# Patient Record
Sex: MALE | Race: OTHER | Employment: FULL TIME | URBAN - METROPOLITAN AREA
[De-identification: names, ages, dates, MRNs, and addresses within clinical notes are randomized per-mention and may not be internally consistent; named-entity substitution may affect disease eponyms.]

---

## 2017-01-27 DIAGNOSIS — E78.5 DYSLIPIDEMIA: ICD-10-CM

## 2017-01-27 DIAGNOSIS — E03.9 ACQUIRED HYPOTHYROIDISM: ICD-10-CM

## 2017-01-27 DIAGNOSIS — R73.03 PRE-DIABETES: ICD-10-CM

## 2017-01-27 DIAGNOSIS — E55.9 VITAMIN D DEFICIENCY: ICD-10-CM

## 2017-01-27 DIAGNOSIS — N50.82 SCROTAL PAIN: ICD-10-CM

## 2017-01-30 RX ORDER — LEVOTHYROXINE SODIUM 25 UG/1
TABLET ORAL
Qty: 30 TAB | Refills: 0 | Status: SHIPPED | OUTPATIENT
Start: 2017-01-30 | End: 2017-03-06 | Stop reason: SDUPTHER

## 2017-03-06 DIAGNOSIS — N50.82 SCROTAL PAIN: ICD-10-CM

## 2017-03-06 DIAGNOSIS — E78.5 DYSLIPIDEMIA: ICD-10-CM

## 2017-03-06 DIAGNOSIS — E55.9 VITAMIN D DEFICIENCY: ICD-10-CM

## 2017-03-06 DIAGNOSIS — E03.9 ACQUIRED HYPOTHYROIDISM: ICD-10-CM

## 2017-03-06 DIAGNOSIS — R73.03 PRE-DIABETES: ICD-10-CM

## 2017-03-08 RX ORDER — LEVOTHYROXINE SODIUM 25 UG/1
TABLET ORAL
Qty: 30 TAB | Refills: 0 | Status: SHIPPED | OUTPATIENT
Start: 2017-03-08 | End: 2017-04-20 | Stop reason: SDUPTHER

## 2017-04-10 ENCOUNTER — OFFICE VISIT (OUTPATIENT)
Dept: FAMILY MEDICINE CLINIC | Age: 47
End: 2017-04-10

## 2017-04-10 VITALS
SYSTOLIC BLOOD PRESSURE: 104 MMHG | DIASTOLIC BLOOD PRESSURE: 60 MMHG | OXYGEN SATURATION: 98 % | RESPIRATION RATE: 18 BRPM | TEMPERATURE: 98.2 F | HEART RATE: 88 BPM | BODY MASS INDEX: 19.31 KG/M2 | HEIGHT: 69 IN | WEIGHT: 130.4 LBS

## 2017-04-10 DIAGNOSIS — R79.89 LOW VITAMIN D LEVEL: ICD-10-CM

## 2017-04-10 DIAGNOSIS — E03.9 ACQUIRED HYPOTHYROIDISM: Primary | ICD-10-CM

## 2017-04-10 NOTE — PROGRESS NOTES
HISTORY OF PRESENT ILLNESS  Henri Fermin is a 48 y.o. male. HPI: Pt is following up to check his TSH, currently taking 25 mcg daily. Also requesting to his vitamin D level. Past Medical History:   Diagnosis Date    Cold hands and feet     Dizziness     GERD (gastroesophageal reflux disease)     Thyroid disease      Past Surgical History:   Procedure Laterality Date    HX ENDOSCOPY     No Known Allergies    Current Outpatient Prescriptions:     levothyroxine (SYNTHROID) 25 mcg tablet, TAKE 1 TABLET BY MOUTH EVERY DAY BEFORE BREAKFAST, Disp: 30 Tab, Rfl: 0    cholecalciferol (VITAMIN D3) 1,000 unit tablet, Take 1 Tab by mouth two (2) times a day., Disp: 60 Tab, Rfl: 3    calcium carbonate (TUMS) 200 mg calcium (500 mg) chew, Take 1 Tab by mouth daily. , Disp: , Rfl:     multivitamin, tx-iron-ca-min (THERA-M W/ IRON) 9 mg iron-400 mcg tab tablet, Take 1 Tab by mouth daily. , Disp: , Rfl:   Review of Systems   Constitutional: Negative. Respiratory: Negative. Cardiovascular: Negative. Gastrointestinal: Negative. Blood pressure 104/60, pulse 88, temperature 98.2 °F (36.8 °C), temperature source Oral, resp. rate 18, height 5' 9\" (1.753 m), weight 130 lb 6.4 oz (59.1 kg), SpO2 98 %. Physical Exam   Constitutional: No distress. HENT:   Mouth/Throat: Oropharynx is clear and moist.   Neck: Neck supple. Cardiovascular: Normal rate and regular rhythm. No murmur heard. Pulmonary/Chest: Effort normal and breath sounds normal.   Abdominal: Soft. Bowel sounds are normal.   Nursing note and vitals reviewed. ASSESSMENT and PLAN    ICD-10-CM ICD-9-CM    1. Acquired hypothyroidism E03.9 244.9 TSH 3RD GENERATION      T4, FREE   2.  Low vitamin D level E55.9 268.9 VITAMIN D, 25 HYDROXY   await labs  Pt was given an after visit summary which includes diagnosis, current medicines and vital and voiced understanding of treatment plan

## 2017-04-10 NOTE — PROGRESS NOTES
1. Have you been to the ER, urgent care clinic since your last visit? Hospitalized since your last visit? No    2. Have you seen or consulted any other health care providers outside of the [address] [place] [address] since your last visit? Include any pap smears or colon screening.  No     Chief Complaint   Patient presents with    Thyroid Problem     follow up       Learning Assessment 7/8/2016   PRIMARY LEARNER Patient   PRIMARY LANGUAGE ENGLISH   LEARNER PREFERENCE PRIMARY DEMONSTRATION   ANSWERED BY patient   RELATIONSHIP SELF

## 2017-04-10 NOTE — MR AVS SNAPSHOT
Visit Information Date & Time Provider Department Dept. Phone Encounter #  
 4/10/2017 12:00 PM Elise Chang, [address] [phone] [id number] Upcoming Health Maintenance Date Due DTaP/Tdap/Td series (1 - Tdap) 9/1/1989 INFLUENZA AGE 9 TO ADULT 8/1/2016 Allergies as of 4/10/2017  Review Complete On: 6/78/9506 By: Laura Roldan LPN No Known Allergies Current Immunizations  Never Reviewed No immunizations on file. Not reviewed this visit You Were Diagnosed With   
  
 Codes Comments Acquired hypothyroidism    -  Primary ICD-10-CM: E03.9 ICD-9-CM: 788. 9 Vitals BP Pulse Temp Resp Height(growth percentile) Weight(growth percentile) 104/60 (BP 1 Location: Left arm, BP Patient Position: Sitting) 88 98.2 °F (36.8 °C) (Oral) 18 5' 9\" (1.753 m) 130 lb 6.4 oz (59.1 kg) SpO2 BMI Smoking Status 98% 19.26 kg/m2 Never Smoker Vitals History BMI and BSA Data Body Mass Index Body Surface Area  
 19.26 kg/m 2 1.7 m 2 Preferred Pharmacy Pharmacy Name Phone [id number] [place] [address] [phone] Your Updated Medication List  
  
   
This list is accurate as of: 4/10/17 12:27 PM.  Always use your most recent med list.  
  
  
  
  
 calcium carbonate 200 mg calcium (500 mg) Michael Larger Commonly known as:  TUMS Take 1 Tab by mouth daily. cholecalciferol 1,000 unit tablet Commonly known as:  VITAMIN D3 Take 1 Tab by mouth two (2) times a day. levothyroxine 25 mcg tablet Commonly known as:  SYNTHROID  
TAKE 1 TABLET BY MOUTH EVERY DAY BEFORE BREAKFAST  
  
 multivitamin, tx-iron-ca-min 9 mg iron-400 mcg Tab tablet Commonly known as:  THERA-M w/ IRON Take 1 Tab by mouth daily. We Performed the Following T4, FREE D6826304 CPT(R)] TSH 3RD GENERATION [06708 CPT(R)] Introducing [facility]! Dear Nima Curry: Thank you for requesting a [employer] account. Our records indicate that you already have an active [employer] account. You can access your account anytime at [email or web address] Did you know that you can access your hospital and ER discharge instructions at any time in [employer]? You can also review all of your test results from your hospital stay or ER visit. Additional Information If you have questions, please visit the Frequently Asked Questions section of the [employer] website at [email or web address] Remember, [employer] is NOT to be used for urgent needs. For medical emergencies, dial 911. Now available from your iPhone and Android! Please provide this summary of care documentation to your next provider. If you have any questions after today's visit, please call [phone].

## 2017-04-11 LAB
25(OH)D3+25(OH)D2 SERPL-MCNC: 31.6 NG/ML (ref 30–100)
T4 FREE SERPL-MCNC: 1.44 NG/DL (ref 0.82–1.77)
TSH SERPL DL<=0.005 MIU/L-ACNC: 2.27 UIU/ML (ref 0.45–4.5)

## 2017-04-20 DIAGNOSIS — R73.03 PRE-DIABETES: ICD-10-CM

## 2017-04-20 DIAGNOSIS — E55.9 VITAMIN D DEFICIENCY: ICD-10-CM

## 2017-04-20 DIAGNOSIS — N50.82 SCROTAL PAIN: ICD-10-CM

## 2017-04-20 DIAGNOSIS — E78.5 DYSLIPIDEMIA: ICD-10-CM

## 2017-04-20 DIAGNOSIS — E03.9 ACQUIRED HYPOTHYROIDISM: ICD-10-CM

## 2017-04-21 RX ORDER — LEVOTHYROXINE SODIUM 25 UG/1
TABLET ORAL
Qty: 30 TAB | Refills: 11 | Status: SHIPPED | OUTPATIENT
Start: 2017-04-21 | End: 2018-04-02 | Stop reason: SDUPTHER

## 2017-10-06 ENCOUNTER — OFFICE VISIT (OUTPATIENT)
Dept: FAMILY MEDICINE CLINIC | Age: 47
End: 2017-10-06

## 2017-10-06 VITALS
OXYGEN SATURATION: 98 % | SYSTOLIC BLOOD PRESSURE: 102 MMHG | DIASTOLIC BLOOD PRESSURE: 82 MMHG | WEIGHT: 136.4 LBS | BODY MASS INDEX: 20.67 KG/M2 | RESPIRATION RATE: 18 BRPM | HEIGHT: 68 IN | HEART RATE: 80 BPM | TEMPERATURE: 98.5 F

## 2017-10-06 DIAGNOSIS — Z11.4 SCREENING FOR HIV WITHOUT PRESENCE OF RISK FACTORS: ICD-10-CM

## 2017-10-06 DIAGNOSIS — Z13.1 SCREENING FOR DIABETES MELLITUS: ICD-10-CM

## 2017-10-06 DIAGNOSIS — E55.9 VITAMIN D DEFICIENCY: ICD-10-CM

## 2017-10-06 DIAGNOSIS — Z13.220 SCREENING FOR LIPID DISORDERS: ICD-10-CM

## 2017-10-06 DIAGNOSIS — Z00.00 ROUTINE GENERAL MEDICAL EXAMINATION AT HEALTH CARE FACILITY: Primary | ICD-10-CM

## 2017-10-06 DIAGNOSIS — E03.9 ACQUIRED HYPOTHYROIDISM: ICD-10-CM

## 2017-10-06 DIAGNOSIS — R20.9 COLD EXTREMITIES: ICD-10-CM

## 2017-10-06 NOTE — MR AVS SNAPSHOT
Visit Information Date & Time Provider Department Dept. Phone Encounter #  
 10/6/2017 10:30 AM Boone Alanis MD [address] [place] [address] Road [phone] [id number] Follow-up Instructions Return in about 1 year (around 10/6/2018) for CPE. Upcoming Health Maintenance Date Due DTaP/Tdap/Td series (1 - Tdap) 9/1/1989 Allergies as of 10/6/2017  Review Complete On: 10/6/2017 By: Norberto Santiago No Known Allergies Current Immunizations  Never Reviewed No immunizations on file. Not reviewed this visit You Were Diagnosed With   
  
 Codes Comments Routine general medical examination at health care facility    -  Primary ICD-10-CM: Z00.00 ICD-9-CM: V70.0 Screening for lipid disorders     ICD-10-CM: Z13.220 ICD-9-CM: V77.91 Screening for diabetes mellitus     ICD-10-CM: Z13.1 ICD-9-CM: V77.1 Acquired hypothyroidism     ICD-10-CM: E03.9 ICD-9-CM: 833. 9 Vitamin D deficiency     ICD-10-CM: E55.9 ICD-9-CM: 268.9 Cold extremities     ICD-10-CM: R68.89 ICD-9-CM: 780.99 Vitals BP Pulse Temp Resp Height(growth percentile) Weight(growth percentile) 102/82 (BP 1 Location: Left arm, BP Patient Position: Sitting) 80 98.5 °F (36.9 °C) (Oral) 18 5' 8.25\" (1.734 m) 136 lb 6.4 oz (61.9 kg) SpO2 BMI Smoking Status 98% 20.59 kg/m2 Never Smoker BMI and BSA Data Body Mass Index Body Surface Area 20.59 kg/m 2 1.73 m 2 Preferred Pharmacy Pharmacy Name Phone [id number] [place] [address] [phone] Your Updated Medication List  
  
   
This list is accurate as of: 10/6/17 11:07 AM.  Always use your most recent med list.  
  
  
  
  
 calcium carbonate 200 mg calcium (500 mg) Allan Calderon Commonly known as:  TUMS Take 1 Tab by mouth as needed. cholecalciferol 1,000 unit tablet Commonly known as:  VITAMIN D3  
 Take 1 Tab by mouth two (2) times a day. levothyroxine 25 mcg tablet Commonly known as:  SYNTHROID  
TAKE 1 TABLET BY MOUTH EVERY DAY BEFORE BREAKFAST We Performed the Following CBC W/O DIFF [33252 CPT(R)] HEMOGLOBIN A1C WITH EAG [86728 CPT(R)] LIPID PANEL [48007 CPT(R)] METABOLIC PANEL, COMPREHENSIVE [92486 CPT(R)] TSH AND FREE T4 [95958 CPT(R)] VITAMIN B12 & FOLATE [15501 CPT(R)] VITAMIN D, 25 HYDROXY [id number] CPT(R)] Follow-up Instructions Return in about 1 year (around 10/6/2018) for CPE. Patient Instructions Well Visit, Ages 25 to 48: Care Instructions Your Care Instructions Physical exams can help you stay healthy. Your doctor has checked your overall health and may have suggested ways to take good care of yourself. He or she also may have recommended tests. At home, you can help prevent illness with healthy eating, regular exercise, and other steps. Follow-up care is a key part of your treatment and safety. Be sure to make and go to all appointments, and call your doctor if you are having problems. It's also a good idea to know your test results and keep a list of the medicines you take. How can you care for yourself at home? · Reach and stay at a healthy weight. This will lower your risk for many problems, such as obesity, diabetes, heart disease, and high blood pressure. · Get at least 30 minutes of physical activity on most days of the week. Walking is a good choice. You also may want to do other activities, such as running, swimming, cycling, or playing tennis or team sports. Discuss any changes in your exercise program with your doctor. · Do not smoke or allow others to smoke around you. If you need help quitting, talk to your doctor about stop-smoking programs and medicines. These can increase your chances of quitting for good.  
· Talk to your doctor about whether you have any risk factors for sexually transmitted infections (STIs). Having one sex partner (who does not have STIs and does not have sex with anyone else) is a good way to avoid these infections. · Use birth control if you do not want to have children at this time. Talk with your doctor about the choices available and what might be best for you. · Protect your skin from too much sun. When you're outdoors from 10 a.m. to 4 p.m., stay in the shade or cover up with clothing and a hat with a wide brim. Wear sunglasses that block UV rays. Even when it's cloudy, put broad-spectrum sunscreen (SPF 30 or higher) on any exposed skin. · See a dentist one or two times a year for checkups and to have your teeth cleaned. · Wear a seat belt in the car. · Drink alcohol in moderation, if at all. That means no more than 2 drinks a day for men and 1 drink a day for women. Follow your doctor's advice about when to have certain tests. These tests can spot problems early. For everyone · Cholesterol. Have the fat (cholesterol) in your blood tested after age 21. Your doctor will tell you how often to have this done based on your age, family history, or other things that can increase your risk for heart disease. · Blood pressure. Have your blood pressure checked during a routine doctor visit. Your doctor will tell you how often to check your blood pressure based on your age, your blood pressure results, and other factors. · Vision. Talk with your doctor about how often to have a glaucoma test. 
· Diabetes. Ask your doctor whether you should have tests for diabetes. · Colon cancer. Have a test for colon cancer at age 48. You may have one of several tests. If you are younger than 48, you may need a test earlier if you have any risk factors. Risk factors include whether you already had a precancerous polyp removed from your colon or whether your parent, brother, sister, or child has had colon cancer. For women · Breast exam and mammogram. Talk to your doctor about when you should have a clinical breast exam and a mammogram. Medical experts differ on whether and how often women under 50 should have these tests. Your doctor can help you decide what is right for you. · Pap test and pelvic exam. Begin Pap tests at age 24. A Pap test is the best way to find cervical cancer. The test often is part of a pelvic exam. Ask how often to have this test. 
· Tests for sexually transmitted infections (STIs). Ask whether you should have tests for STIs. You may be at risk if you have sex with more than one person, especially if your partners do not wear condoms. For men · Tests for sexually transmitted infections (STIs). Ask whether you should have tests for STIs. You may be at risk if you have sex with more than one person, especially if you do not wear a condom. · Testicular cancer exam. Ask your doctor whether you should check your testicles regularly. · Prostate exam. Talk to your doctor about whether you should have a blood test (called a PSA test) for prostate cancer. Experts differ on whether and when men should have this test. Some experts suggest it if you are older than 39 and are -American or have a father or brother who got prostate cancer when he was younger than 72. When should you call for help? Watch closely for changes in your health, and be sure to contact your doctor if you have any problems or symptoms that concern you. Where can you learn more? Go to http://ginette-sheron.info/. Enter P072 in the search box to learn more about \"Well Visit, Ages 25 to 48: Care Instructions. \" Current as of: July 19, 2016 Content Version: 11.3 © 5731-8361 Healthwise[employer] Incorporated. Care instructions adapted under license by [employer] (which disclaims liability or warranty for this information).  If you have questions about a medical condition or this instruction, always ask your healthcare professional. [address] any warranty or liability for your use of this information. Introducing [facility]! Dear Margarita Nassar: Thank you for requesting a [employer] account. Our records indicate that you already have an active [employer] account. You can access your account anytime at [email or web address] Did you know that you can access your hospital and ER discharge instructions at any time in [employer]? You can also review all of your test results from your hospital stay or ER visit. Additional Information If you have questions, please visit the Frequently Asked Questions section of the [employer] website at [email or web address]. Remember, [employer] is NOT to be used for urgent needs. For medical emergencies, dial 911. Now available from your iPhone and Android! Please provide this summary of care documentation to your next provider. If you have any questions after today's visit, please call [phone].

## 2017-10-06 NOTE — PROGRESS NOTES
Patient Name: Caroline Chahal   MRN: <[id number]>    David Morrow is a 50 y.o. male who presents with the following:     STD screening: amenable to HIV screening. PSA: normal urology work up previously; no fhx of prostate cancer. Lab Results   Component Value Date/Time    Prostate Specific Ag 2.3 09/12/2016 08:20 AM     CAD risk factors:  HTN: wnl no meds  Lipid: due  Lab Results   Component Value Date/Time    Cholesterol, total 197 09/12/2016 08:20 AM    HDL Cholesterol 76 09/12/2016 08:20 AM    LDL, calculated 108 09/12/2016 08:20 AM    VLDL, calculated 13 09/12/2016 08:20 AM    Triglyceride 64 09/12/2016 08:20 AM     DM: pre DM  Lab Results   Component Value Date/Time    Hemoglobin A1c 5.8 09/12/2016 08:20 AM     Hx of hypothyroidism, started levothyroxine one year ago  Has had ongoing cold hands/feet for many years; mildly improve once starting thyroid meds. No symptoms when he first wakes up but tend to occur throughout the day. Does not follow any strict diet. Review of Systems   Constitutional: Negative for fever, malaise/fatigue and weight loss. Respiratory: Negative for cough, hemoptysis, shortness of breath and wheezing. Cardiovascular: Negative for chest pain, palpitations, leg swelling and PND. Gastrointestinal: Negative for abdominal pain, constipation, diarrhea, nausea and vomiting. Neurological: Negative for dizziness, tingling, tremors, sensory change, speech change, focal weakness, seizures, loss of consciousness and headaches. The patient's medications, allergies, past medical history, surgical history, family history and social history were reviewed and updated where appropriate. Prior to Admission medications    Medication Sig Start Date End Date Taking?  Authorizing Provider   levothyroxine (SYNTHROID) 25 mcg tablet TAKE 1 TABLET BY MOUTH EVERY DAY BEFORE BREAKFAST 4/21/17  Yes Pierre Ovalle NP   cholecalciferol (VITAMIN D3) 1,000 unit tablet Take 1 Tab by mouth two (2) times a day. 9/20/16  Yes Kelley Pascual MD   calcium carbonate (TUMS) 200 mg calcium (500 mg) chew Take 1 Tab by mouth as needed. Yes Historical Provider       No Known Allergies      Past Medical History:   Diagnosis Date    Acquired hypothyroidism 4/10/2017    GERD (gastroesophageal reflux disease)        Past Surgical History:   Procedure Laterality Date    HX ENDOSCOPY         Family History   Problem Relation Age of Onset    Thyroid Disease Mother     Elevated Lipids Mother     No Known Problems Father        Social History     Social History    Marital status:      Spouse name: N/A    Number of children: N/A    Years of education: N/A     Occupational History    Not on file. Social History Main Topics    Smoking status: Never Smoker    Smokeless tobacco: Never Used    Alcohol use No    Drug use: No    Sexual activity: Yes     Partners: Female     Birth control/ protection: Condom     Other Topics Concern    Not on file     Social History Narrative           OBJECTIVE    Visit Vitals    /82 (BP 1 Location: Left arm, BP Patient Position: Sitting)    Pulse 80    Temp 98.5 °F (36.9 °C) (Oral)    Resp 18    Ht 5' 8.25\" (1.734 m)    Wt 136 lb 6.4 oz (61.9 kg)    SpO2 98%    BMI 20.59 kg/m2       Physical Exam   Constitutional: He is well-developed, well-nourished, and in no distress. No distress. HENT:   Head: Normocephalic and atraumatic. Right Ear: External ear normal.   Left Ear: External ear normal.   Eyes: Conjunctivae and EOM are normal. Pupils are equal, round, and reactive to light. Neck: Normal range of motion. Neck supple. No thyromegaly present. Cardiovascular: Normal rate, regular rhythm and normal heart sounds. Exam reveals no gallop and no friction rub. No murmur heard. Pulmonary/Chest: Effort normal and breath sounds normal. No respiratory distress. He has no wheezes. Lymphadenopathy:     He has no cervical adenopathy. Skin: He is not diaphoretic. Psychiatric: Mood, memory, affect and judgment normal.   Nursing note and vitals reviewed. ASSESSMENT AND PLAN  Vito Ellis is a 50 y.o. male who presents today for:    1. Routine general medical examination at health care facility  Reviewed age appropriate screening tests as recommended by the [place]PSTF Preventive Services Database with patient today. 2. Screening for lipid disorders  Will calculate ASCVD risk score pending labs. - LIPID PANEL    3. Screening for diabetes mellitus  - HEMOGLOBIN A1C WITH EAG  - METABOLIC PANEL, COMPREHENSIVE    4. Acquired hypothyroidism  Stable, continue current treatment pending review of labs. - TSH AND FREE T4    5. Vitamin D deficiency  Stable, continue current treatment pending review of labs. - VITAMIN D, 25 HYDROXY    6. Cold extremities  Possible poor circulation and/or related to thyroid d/o but will r/o etiologies as below. - CBC W/O DIFF  - VITAMIN B12 & FOLATE    7. Screening for HIV without presence of risk factors  - HIV 1/2 AG/AB, 4TH GENERATION,W RFLX CONFIRM       Medications Discontinued During This Encounter   Medication Reason    multivitamin, tx-iron-ca-min (THERA-M W/ IRON) 9 mg iron-400 mcg tab tablet Not A Current Medication       Follow-up Disposition:  Return in about 1 year (around 10/6/2018) for CPE. Medication risks/benefits/costs/interactions/alternatives discussed with patient. Advised patient to call back or return to office if symptoms worsen/change/persist. If patient cannot reach us or should anything more severe/urgent arise he/she should proceed directly to the nearest emergency department. Discussed expected course/resolution/complications of diagnosis in detail with patient. Patient given a written after visit summary which includes his/her diagnoses, current medications and vitals. Patient expressed understanding with the diagnosis and plan.      Saravanan Monroy M.D.

## 2017-10-06 NOTE — PROGRESS NOTES
Chief Complaint   Patient presents with    Complete Physical     fasting     1. Have you been to the ER, urgent care clinic since your last visit? Hospitalized since your last visit? Yes, [facility] for a sinus infection about 3 months. 2. Have you seen or consulted any other health care providers outside of the [address] [place] [address] since your last visit? Include any pap smears or colon screening.  No

## 2017-10-07 LAB
25(OH)D3+25(OH)D2 SERPL-MCNC: 31.1 NG/ML (ref 30–100)
ALBUMIN SERPL-MCNC: 5 G/DL (ref 3.5–5.5)
ALBUMIN/GLOB SERPL: 2.2 {RATIO} (ref 1.2–2.2)
ALP SERPL-CCNC: 87 IU/L (ref 39–117)
ALT SERPL-CCNC: 20 IU/L (ref 0–44)
AST SERPL-CCNC: 23 IU/L (ref 0–40)
BILIRUB SERPL-MCNC: 0.7 MG/DL (ref 0–1.2)
BUN SERPL-MCNC: 13 MG/DL (ref 6–24)
BUN/CREAT SERPL: 14 (ref 9–20)
CALCIUM SERPL-MCNC: 9.7 MG/DL (ref 8.7–10.2)
CHLORIDE SERPL-SCNC: 99 MMOL/L (ref 96–106)
CHOLEST SERPL-MCNC: 202 MG/DL (ref 100–199)
CO2 SERPL-SCNC: 29 MMOL/L (ref 18–29)
CREAT SERPL-MCNC: 0.9 MG/DL (ref 0.76–1.27)
ERYTHROCYTE [DISTWIDTH] IN BLOOD BY AUTOMATED COUNT: 13.2 % (ref 12.3–15.4)
EST. AVERAGE GLUCOSE BLD GHB EST-MCNC: 114 MG/DL
FOLATE SERPL-MCNC: >20 NG/ML
GLOBULIN SER CALC-MCNC: 2.3 G/DL (ref 1.5–4.5)
GLUCOSE SERPL-MCNC: 95 MG/DL (ref 65–99)
HBA1C MFR BLD: 5.6 % (ref 4.8–5.6)
HCT VFR BLD AUTO: 44.9 % (ref 37.5–51)
HDLC SERPL-MCNC: 79 MG/DL
HGB BLD-MCNC: 15.5 G/DL (ref 12.6–17.7)
HIV 1+2 AB+HIV1 P24 AG SERPL QL IA: NON REACTIVE
INTERPRETATION, 910389: NORMAL
LDLC SERPL CALC-MCNC: 106 MG/DL (ref 0–99)
MCH RBC QN AUTO: 29.6 PG (ref 26.6–33)
MCHC RBC AUTO-ENTMCNC: 34.5 G/DL (ref 31.5–35.7)
MCV RBC AUTO: 86 FL (ref 79–97)
PLATELET # BLD AUTO: 267 X10E3/UL (ref 150–379)
POTASSIUM SERPL-SCNC: 4.1 MMOL/L (ref 3.5–5.2)
PROT SERPL-MCNC: 7.3 G/DL (ref 6–8.5)
RBC # BLD AUTO: 5.23 X10E6/UL (ref 4.14–5.8)
SODIUM SERPL-SCNC: 141 MMOL/L (ref 134–144)
T4 FREE SERPL-MCNC: 1.35 NG/DL (ref 0.82–1.77)
TRIGL SERPL-MCNC: 84 MG/DL (ref 0–149)
TSH SERPL DL<=0.005 MIU/L-ACNC: 2.79 UIU/ML (ref 0.45–4.5)
VIT B12 SERPL-MCNC: 872 PG/ML (ref 211–946)
VLDLC SERPL CALC-MCNC: 17 MG/DL (ref 5–40)
WBC # BLD AUTO: 6 X10E3/UL (ref 3.4–10.8)

## 2017-10-10 NOTE — PROGRESS NOTES
Released to [address] You have mildly high cholesterol. I would encourage healthy diets and regular exercise with the goal of healthy weight loss before starting medications for this. Your thyroid levels are normal; please continue your current levothyroxine dose. Levels of vitamin D/B12 and blood counts are all normal.  You do not have diabetes or HIV. Your symptoms of cold hands/feet may be due to poor circulation. Be sure to stay active with regular exercise as this may improve circulation.

## 2018-01-22 ENCOUNTER — TELEPHONE (OUTPATIENT)
Dept: FAMILY MEDICINE CLINIC | Age: 48
End: 2018-01-22

## 2018-01-22 DIAGNOSIS — M54.2 NECK PAIN: Primary | ICD-10-CM

## 2018-01-22 NOTE — TELEPHONE ENCOUNTER
Patient is calling, he states that he went to a physical therapist [facility] last week for an evaluation on his lower back and neck pain. He is requesting to have a referral faxed over to this office so that he can continue physical therapy.      Best call back # for patient: [phone]  Fax # for PT: [phone]

## 2018-01-23 NOTE — TELEPHONE ENCOUNTER
Incoming call from patient.  verified. Informed patient we have received his message and order has been pended to physician once signed I will let him know and fax over the order.  Patient understands

## 2018-02-02 ENCOUNTER — OFFICE VISIT (OUTPATIENT)
Dept: FAMILY MEDICINE CLINIC | Age: 48
End: 2018-02-02

## 2018-02-02 VITALS
HEIGHT: 68 IN | DIASTOLIC BLOOD PRESSURE: 69 MMHG | RESPIRATION RATE: 18 BRPM | SYSTOLIC BLOOD PRESSURE: 113 MMHG | BODY MASS INDEX: 20.61 KG/M2 | OXYGEN SATURATION: 100 % | WEIGHT: 136 LBS | TEMPERATURE: 98 F | HEART RATE: 79 BPM

## 2018-02-02 DIAGNOSIS — S03.00XA DISLOCATION OF TEMPOROMANDIBULAR JOINT, INITIAL ENCOUNTER: ICD-10-CM

## 2018-02-02 DIAGNOSIS — R10.31 CHRONIC RIGHT LOWER QUADRANT PAIN: ICD-10-CM

## 2018-02-02 DIAGNOSIS — G89.29 CHRONIC RIGHT LOWER QUADRANT PAIN: ICD-10-CM

## 2018-02-02 DIAGNOSIS — M54.2 CERVICALGIA: Primary | ICD-10-CM

## 2018-02-02 RX ORDER — CYCLOBENZAPRINE HCL 5 MG
5 TABLET ORAL
Qty: 30 TAB | Refills: 1 | Status: SHIPPED | OUTPATIENT
Start: 2018-02-02 | End: 2018-06-08 | Stop reason: ALTCHOICE

## 2018-02-02 NOTE — PATIENT INSTRUCTIONS
Temporomandibular Disorder: Care Instructions  Your Care Instructions    Temporomandibular (TM) disorders are a problem with the muscles and joints that connect your jaw to your skull. They cause pain when you open your mouth, chew, or yawn. You may feel this pain on one or both sides. TM disorders are often caused by tight jaw muscles. The tightness can be caused by clenching or grinding your teeth. This may happen when you have a lot of stress in your life. If you lower your stress, you may be able to stop clenching or grinding your teeth. This will help relax your jaw and reduce your pain. You may also be able to do some things at home to feel better. But if none of this works, your doctor may prescribe medicine to help relax your muscles and control the pain. Follow-up care is a key part of your treatment and safety. Be sure to make and go to all appointments, and call your doctor if you are having problems. It's also a good idea to know your test results and keep a list of the medicines you take. How can you care for yourself at home? · Put a warm, moist cloth or heating pad set on low on your jaw. Do this for 10 to 20 minutes at a time. Put a thin cloth between the heating pad and your skin. · Avoid hard or chewy foods that cause your jaws to work very hard. Examples include popcorn, jerky, tough meats, chewy breads, gum, and raw apples and carrots. · Choose softer foods that are easy to chew. These include eggs, yogurt, and soup. · Cut your food into small pieces. Chew slowly. · If your jaw gets too painful to chew, or if it locks, you may need to puree your food for a few days or weeks. · To relax your jaw, repeat this exercise for a few minutes every morning and evening. Watch yourself in a mirror. Gently open and close your mouth. Move your jaw straight up and down. But don't do this if it makes your pain worse.   · Get at least 30 minutes of exercise on most days of the week to relieve stress. Walking is a good choice. You also may want to do other activities, such as running, swimming, cycling, or playing tennis or team sports. · Do not:  ¨ Hold a phone between your shoulder and your jaw. ¨ Open your mouth all the way, like when you sing loudly or yawn. ¨ Clench or grind your teeth, bite your lips, or chew your fingernails. ¨ Clench things such as pens, pipes, or cigars between your teeth. When should you call for help? Call your doctor now or seek immediate medical care if:  ? · Your jaw is locked open or shut or it is hard to move your jaw. ? Watch closely for changes in your health, and be sure to contact your doctor if:  ? · Your jaw pain gets worse. ? · Your face is swollen. ? · You do not get better as expected. Where can you learn more? Go to http://ginette-sheron.info/. Enter H108 in the search box to learn more about \"Temporomandibular Disorder: Care Instructions. \"  Current as of: May 12, 2017  Content Version: 11.4  © 2291-4240 [employer]. Care instructions adapted under license by [employer] (which disclaims liability or warranty for this information). If you have questions about a medical condition or this instruction, always ask your healthcare professional. [address] any warranty or liability for your use of this information.
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
## 2018-02-02 NOTE — PROGRESS NOTES
Chief Complaint   Patient presents with    Abdominal Pain     right and left side of lower abdomen- dull pain- 5/10 pain- stated 1 year ago- comes and goes    Neck Pain     back of neck- constant dull pain 5/10 pain- started 2 years ago-     Ear Pain     patient states he feels pressure in his right ear and hears popping noises when he chews food or yawn- worse when its cold outside- comes and goes-started 2 years ago       1. Have you been to the ER, urgent care clinic since your last visit? Hospitalized since your last visit? No    2. Have you seen or consulted any other health care providers outside of the [address] [place] [address] since your last visit? Include any pap smears or colon screening.  No

## 2018-02-02 NOTE — MR AVS SNAPSHOT
[place] 
 
 
 [address] [facility] [address] 
[phone] Patient: Travon Monk MRN: [id number] VWT:[birth year] Visit Information Date & Time Provider Department Dept. Phone Encounter #  
 2/2/2018 10:15 AM Rogelio Paul  [facility] [phone] [id number] Follow-up Instructions Return if symptoms worsen or fail to improve. Upcoming Health Maintenance Date Due DTaP/Tdap/Td series (1 - Tdap) 9/1/1989 Allergies as of 2/2/2018  Review Complete On: 2/2/2018 By: Mathew Elias LPN No Known Allergies Current Immunizations  Never Reviewed No immunizations on file. Not reviewed this visit You Were Diagnosed With   
  
 Codes Comments Cervicalgia    -  Primary ICD-10-CM: M54.2 ICD-9-CM: 723.1 Chronic right lower quadrant pain     ICD-10-CM: R10.31, G89.29 ICD-9-CM: 789.03, 338.29 Dislocation of temporomandibular joint, initial encounter     ICD-10-CM: S03. Fuadie Deck ICD-9-CM: 830.0 Vitals BP Pulse Temp Resp Height(growth percentile) Weight(growth percentile) 113/69 (BP 1 Location: Left arm, BP Patient Position: Sitting) 79 98 °F (36.7 °C) (Oral) 18 5' 8.25\" (1.734 m) 136 lb (61.7 kg) SpO2 BMI Smoking Status 100% 20.53 kg/m2 Never Smoker Vitals History BMI and BSA Data Body Mass Index Body Surface Area 20.53 kg/m 2 1.72 m 2 Preferred Pharmacy Pharmacy Name Phone [id number], [address] [place] [address] [phone] Your Updated Medication List  
  
   
This list is accurate as of: 2/2/18 11:04 AM.  Always use your most recent med list.  
  
  
  
  
 calcium carbonate 200 mg calcium (500 mg) Emi Quarto Commonly known as:  TUMS Take 1 Tab by mouth as needed. cholecalciferol 1,000 unit tablet Commonly known as:  VITAMIN D3  
 Take 1 Tab by mouth two (2) times a day. cyclobenzaprine 5 mg tablet Commonly known as:  FLEXERIL Take 1 Tab by mouth three (3) times daily as needed for Muscle Spasm(s). levothyroxine 25 mcg tablet Commonly known as:  SYNTHROID  
TAKE 1 TABLET BY MOUTH EVERY DAY BEFORE BREAKFAST Prescriptions Sent to Pharmacy Refills  
 cyclobenzaprine (FLEXERIL) 5 mg tablet 1 Sig: Take 1 Tab by mouth three (3) times daily as needed for Muscle Spasm(s). Class: Normal  
 Pharmacy: [facility] #[id number]- Hamida Nan, [address] [place] [address] #: [phone] Route: Oral  
  
We Performed the Following REFERRAL TO GASTROENTEROLOGY [FYV55 Custom] Comments:  
 Please evaluate patient for right lower abdominal pain, screening colonoscopy. REFERRAL TO PHYSICAL THERAPY [ZVW71 Custom] Follow-up Instructions Return if symptoms worsen or fail to improve. To-Do List   
 02/02/2018 Imaging:  XR SPINE CERV 4 OR 5 V Referral Information Referral ID Referred By Referred To  
  
 [id number] JESSICA, [address] [facility] , [place] [facility] [address] Phone: [phone] Visits Status Start Date End Date 1 New Request 2/2/18 2/2/19 If your referral has a status of pending review or denied, additional information will be sent to support the outcome of this decision. Referral ID Referred By Referred To  
 [id number] JESSICA, [address], [address] [facility] Visits Status Start Date End Date 1 New Request 2/2/18 2/2/19 If your referral has a status of pending review or denied, additional information will be sent to support the outcome of this decision. Patient Instructions Temporomandibular Disorder: Care Instructions Your Care Instructions Temporomandibular (TM) disorders are a problem with the muscles and joints that connect your jaw to your skull. They cause pain when you open your mouth, chew, or yawn. You may feel this pain on one or both sides. TM disorders are often caused by tight jaw muscles. The tightness can be caused by clenching or grinding your teeth. This may happen when you have a lot of stress in your life. If you lower your stress, you may be able to stop clenching or grinding your teeth. This will help relax your jaw and reduce your pain. You may also be able to do some things at home to feel better. But if none of this works, your doctor may prescribe medicine to help relax your muscles and control the pain. Follow-up care is a key part of your treatment and safety. Be sure to make and go to all appointments, and call your doctor if you are having problems. It's also a good idea to know your test results and keep a list of the medicines you take. How can you care for yourself at home? · Put a warm, moist cloth or heating pad set on low on your jaw. Do this for 10 to 20 minutes at a time. Put a thin cloth between the heating pad and your skin. · Avoid hard or chewy foods that cause your jaws to work very hard. Examples include popcorn, jerky, tough meats, chewy breads, gum, and raw apples and carrots. · Choose softer foods that are easy to chew. These include eggs, yogurt, and soup. · Cut your food into small pieces. Chew slowly. · If your jaw gets too painful to chew, or if it locks, you may need to puree your food for a few days or weeks. · To relax your jaw, repeat this exercise for a few minutes every morning and evening. Watch yourself in a mirror. Gently open and close your mouth. Move your jaw straight up and down. But don't do this if it makes your pain worse. · Get at least 30 minutes of exercise on most days of the week to relieve stress. Walking is a good choice.  You also may want to do other activities, such as running, swimming, cycling, or playing tennis or team sports. · Do not: 
¨ Hold a phone between your shoulder and your jaw. ¨ Open your mouth all the way, like when you sing loudly or yawn. ¨ Clench or grind your teeth, bite your lips, or chew your fingernails. ¨ Clench things such as pens, pipes, or cigars between your teeth. When should you call for help? Call your doctor now or seek immediate medical care if: 
? · Your jaw is locked open or shut or it is hard to move your jaw. ? Watch closely for changes in your health, and be sure to contact your doctor if: 
? · Your jaw pain gets worse. ? · Your face is swollen. ? · You do not get better as expected. Where can you learn more? Go to http://ginette-sheron.info/. Enter K040 in the search box to learn more about \"Temporomandibular Disorder: Care Instructions. \" Current as of: May 12, 2017 Content Version: 11.4 © 9586-6447 [employer]. Care instructions adapted under license by [employer] (which disclaims liability or warranty for this information). If you have questions about a medical condition or this instruction, always ask your healthcare professional. [address] any warranty or liability for your use of this information. Introducing [facility]! Dear Lyubov Avalos: Thank you for requesting a eParachute account. Our records indicate that you already have an active eParachute account. You can access your account anytime at [email or web address] Did you know that you can access your hospital and ER discharge instructions at any time in eParachute? You can also review all of your test results from your hospital stay or ER visit. Additional Information If you have questions, please visit the Frequently Asked Questions section of the eParachute website at [email or web address] Remember, MyChart is NOT to be used for urgent needs. For medical emergencies, dial 911. Now available from your iPhone and Android! Please provide this summary of care documentation to your next provider. Your primary care clinician is listed as Keke Anne. If you have any questions after today's visit, please call [phone].
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
## 2018-02-15 ENCOUNTER — TELEPHONE (OUTPATIENT)
Dept: FAMILY MEDICINE CLINIC | Age: 48
End: 2018-02-15

## 2018-02-21 ENCOUNTER — TELEPHONE (OUTPATIENT)
Dept: FAMILY MEDICINE CLINIC | Age: 48
End: 2018-02-21

## 2018-02-21 NOTE — TELEPHONE ENCOUNTER
Patient returning your call in ref to MRI process  [facility] #[id number]  Ayesha Freedman  02/21/18

## 2018-02-22 NOTE — TELEPHONE ENCOUNTER
[phone] (home)   Notified patient of Carol Blizzard recommendation    Per Romaine Espinoza     Patient has not completed physical therapy and X-ray was normal. MRI will not be approved by insurance.  We can refer him to orthopedics if needed.

## 2018-03-01 ENCOUNTER — APPOINTMENT (OUTPATIENT)
Dept: GENERAL RADIOLOGY | Age: 48
End: 2018-03-01
Attending: PHYSICIAN ASSISTANT
Payer: COMMERCIAL

## 2018-03-01 ENCOUNTER — HOSPITAL ENCOUNTER (EMERGENCY)
Age: 48
Discharge: HOME | End: 2018-03-01
Attending: EMERGENCY MEDICINE
Payer: COMMERCIAL

## 2018-03-01 VITALS
DIASTOLIC BLOOD PRESSURE: 69 MMHG | HEART RATE: 70 BPM | TEMPERATURE: 98.5 F | RESPIRATION RATE: 16 BRPM | WEIGHT: 136.4 LBS | HEIGHT: 68 IN | OXYGEN SATURATION: 99 % | BODY MASS INDEX: 20.67 KG/M2 | SYSTOLIC BLOOD PRESSURE: 110 MMHG

## 2018-03-01 DIAGNOSIS — G89.29 CHRONIC ABDOMINAL PAIN: Primary | ICD-10-CM

## 2018-03-01 DIAGNOSIS — G89.29 OTHER CHRONIC PAIN: ICD-10-CM

## 2018-03-01 DIAGNOSIS — R10.9 CHRONIC ABDOMINAL PAIN: Primary | ICD-10-CM

## 2018-03-01 DIAGNOSIS — R68.83 CHILLS (WITHOUT FEVER): ICD-10-CM

## 2018-03-01 LAB
ALBUMIN SERPL-MCNC: 4.2 G/DL (ref 3.5–5)
ALBUMIN/GLOB SERPL: 1.4 {RATIO} (ref 1.1–2.2)
ALP SERPL-CCNC: 77 U/L (ref 45–117)
ALT SERPL-CCNC: 21 U/L (ref 12–78)
ANION GAP SERPL CALC-SCNC: 3 MMOL/L (ref 5–15)
APPEARANCE UR: CLEAR
AST SERPL-CCNC: 18 U/L (ref 15–37)
ATRIAL RATE: 80 BPM
BACTERIA URNS QL MICRO: NEGATIVE /HPF
BASOPHILS # BLD: 0 K/UL (ref 0–0.1)
BASOPHILS NFR BLD: 1 % (ref 0–1)
BILIRUB SERPL-MCNC: 0.7 MG/DL (ref 0.2–1)
BILIRUB UR QL: NEGATIVE
BUN SERPL-MCNC: 17 MG/DL (ref 6–20)
BUN/CREAT SERPL: 17 (ref 12–20)
CALCIUM SERPL-MCNC: 9 MG/DL (ref 8.5–10.1)
CALCULATED P AXIS, ECG09: 42 DEGREES
CALCULATED R AXIS, ECG10: 70 DEGREES
CALCULATED T AXIS, ECG11: 66 DEGREES
CHLORIDE SERPL-SCNC: 107 MMOL/L (ref 97–108)
CO2 SERPL-SCNC: 33 MMOL/L (ref 21–32)
COLOR UR: NORMAL
CREAT SERPL-MCNC: 0.99 MG/DL (ref 0.7–1.3)
DIAGNOSIS, 93000: NORMAL
DIFFERENTIAL METHOD BLD: NORMAL
EOSINOPHIL # BLD: 0 K/UL (ref 0–0.4)
EOSINOPHIL NFR BLD: 1 % (ref 0–7)
EPITH CASTS URNS QL MICRO: NORMAL /LPF
ERYTHROCYTE [DISTWIDTH] IN BLOOD BY AUTOMATED COUNT: 11.9 % (ref 11.5–14.5)
GLOBULIN SER CALC-MCNC: 3.1 G/DL (ref 2–4)
GLUCOSE SERPL-MCNC: 87 MG/DL (ref 65–100)
GLUCOSE UR STRIP.AUTO-MCNC: NEGATIVE MG/DL
HCT VFR BLD AUTO: 43.8 % (ref 36.6–50.3)
HGB BLD-MCNC: 14.8 G/DL (ref 12.1–17)
HGB UR QL STRIP: NEGATIVE
HYALINE CASTS URNS QL MICRO: NORMAL /LPF (ref 0–5)
IMM GRANULOCYTES # BLD: 0 K/UL (ref 0–0.04)
IMM GRANULOCYTES NFR BLD AUTO: 0 % (ref 0–0.5)
KETONES UR QL STRIP.AUTO: NEGATIVE MG/DL
LEUKOCYTE ESTERASE UR QL STRIP.AUTO: NEGATIVE
LYMPHOCYTES # BLD: 1.7 K/UL (ref 0.8–3.5)
LYMPHOCYTES NFR BLD: 36 % (ref 12–49)
MCH RBC QN AUTO: 29.7 PG (ref 26–34)
MCHC RBC AUTO-ENTMCNC: 33.8 G/DL (ref 30–36.5)
MCV RBC AUTO: 88 FL (ref 80–99)
MONOCYTES # BLD: 0.4 K/UL (ref 0–1)
MONOCYTES NFR BLD: 7 % (ref 5–13)
NEUTS SEG # BLD: 2.6 K/UL (ref 1.8–8)
NEUTS SEG NFR BLD: 55 % (ref 32–75)
NITRITE UR QL STRIP.AUTO: NEGATIVE
NRBC # BLD: 0 K/UL (ref 0–0.01)
NRBC BLD-RTO: 0 PER 100 WBC
P-R INTERVAL, ECG05: 130 MS
PH UR STRIP: 7 [PH] (ref 5–8)
PLATELET # BLD AUTO: 219 K/UL (ref 150–400)
PMV BLD AUTO: 9.3 FL (ref 8.9–12.9)
POTASSIUM SERPL-SCNC: 3.6 MMOL/L (ref 3.5–5.1)
PROT SERPL-MCNC: 7.3 G/DL (ref 6.4–8.2)
PROT UR STRIP-MCNC: NEGATIVE MG/DL
Q-T INTERVAL, ECG07: 364 MS
QRS DURATION, ECG06: 100 MS
QTC CALCULATION (BEZET), ECG08: 419 MS
RBC # BLD AUTO: 4.98 M/UL (ref 4.1–5.7)
RBC #/AREA URNS HPF: NORMAL /HPF (ref 0–5)
SODIUM SERPL-SCNC: 143 MMOL/L (ref 136–145)
SP GR UR REFRACTOMETRY: 1.01 (ref 1–1.03)
T4 FREE SERPL-MCNC: 1.2 NG/DL (ref 0.8–1.5)
TSH SERPL DL<=0.05 MIU/L-ACNC: 3.55 UIU/ML (ref 0.36–3.74)
UR CULT HOLD, URHOLD: NORMAL
UROBILINOGEN UR QL STRIP.AUTO: 0.2 EU/DL (ref 0.2–1)
VENTRICULAR RATE, ECG03: 80 BPM
WBC # BLD AUTO: 4.8 K/UL (ref 4.1–11.1)
WBC URNS QL MICRO: NORMAL /HPF (ref 0–4)

## 2018-03-01 PROCEDURE — 74011250636 HC RX REV CODE- 250/636: Performed by: PHYSICIAN ASSISTANT

## 2018-03-01 PROCEDURE — 36415 COLL VENOUS BLD VENIPUNCTURE: CPT | Performed by: PHYSICIAN ASSISTANT

## 2018-03-01 PROCEDURE — 85025 COMPLETE CBC W/AUTO DIFF WBC: CPT | Performed by: PHYSICIAN ASSISTANT

## 2018-03-01 PROCEDURE — 80053 COMPREHEN METABOLIC PANEL: CPT | Performed by: PHYSICIAN ASSISTANT

## 2018-03-01 PROCEDURE — 84443 ASSAY THYROID STIM HORMONE: CPT | Performed by: PHYSICIAN ASSISTANT

## 2018-03-01 PROCEDURE — 81001 URINALYSIS AUTO W/SCOPE: CPT | Performed by: PHYSICIAN ASSISTANT

## 2018-03-01 PROCEDURE — 74019 RADEX ABDOMEN 2 VIEWS: CPT

## 2018-03-01 PROCEDURE — 99283 EMERGENCY DEPT VISIT LOW MDM: CPT

## 2018-03-01 PROCEDURE — 93005 ELECTROCARDIOGRAM TRACING: CPT

## 2018-03-01 PROCEDURE — 84439 ASSAY OF FREE THYROXINE: CPT | Performed by: PHYSICIAN ASSISTANT

## 2018-03-01 PROCEDURE — 96360 HYDRATION IV INFUSION INIT: CPT

## 2018-03-01 RX ADMIN — SODIUM CHLORIDE 1000 ML: 900 INJECTION, SOLUTION INTRAVENOUS at 10:04

## 2018-03-01 NOTE — ED PROVIDER NOTES
HPI Comments: 50year old male hx borderline hypothyroid, GERD presenting for multiple complaints. Pt notes that everything he is coming to the ED for today has been going on \"for over a year. \"  Has seen his PCP but notes that he does not have time to follow up with specialists. Denies any worsening of symptoms. Pt reports the following; intermittent cold hands, chills, right sided low back pain, right sided neck pain, lightheadedness with ambulation, RLQ abdominal pain, bilateral jaw pain. Denies fever, unexpected weight change, rash, vomiting, diarrhea, constipation, melena, hematochezia, CP, SOB, palpitations. Pt is on thyroid replacement and has taken flexeril at home with minimal relief. Notes that back pain sometimes radiates into the right leg; no numbness or weakness. PMHx; as above  Social: non-smoker    Patient is a 50 y.o. male presenting with chills, hand problem, and abdominal pain. The history is provided by the patient and medical records. Chills    Pertinent negatives include no chest pain, no diarrhea, no vomiting, no congestion, no headaches, no cough, no shortness of breath and no rash. Hand Problem      Abdominal Pain    Pertinent negatives include no fever, no diarrhea, no vomiting, no constipation, no dysuria, no headaches and no chest pain. Past Medical History:   Diagnosis Date    Acquired hypothyroidism 4/10/2017    GERD (gastroesophageal reflux disease)        Past Surgical History:   Procedure Laterality Date    HX ENDOSCOPY           Family History:   Problem Relation Age of Onset    Thyroid Disease Mother     Elevated Lipids Mother     No Known Problems Father        Social History     Social History    Marital status:      Spouse name: N/A    Number of children: N/A    Years of education: N/A     Occupational History    Not on file.      Social History Main Topics    Smoking status: Never Smoker    Smokeless tobacco: Never Used    Alcohol use No  Drug use: No    Sexual activity: Yes     Partners: Female     Birth control/ protection: Condom     Other Topics Concern    Not on file     Social History Narrative         ALLERGIES: Review of patient's allergies indicates no known allergies. Review of Systems   Constitutional: Positive for chills. Negative for fever and unexpected weight change. HENT: Negative for congestion. Eyes: Negative for discharge and visual disturbance. Respiratory: Negative for cough and shortness of breath. Cardiovascular: Negative for chest pain and leg swelling. Gastrointestinal: Positive for abdominal pain. Negative for anal bleeding, blood in stool, constipation, diarrhea and vomiting. Genitourinary: Negative for dysuria. Musculoskeletal: Negative for joint swelling and neck stiffness. Skin: Negative for rash and wound. Neurological: Positive for light-headedness. Negative for seizures, syncope and headaches. All other systems reviewed and are negative. Vitals:    03/01/18 0923   BP: 116/74   Pulse: 91   Resp: 16   Temp: 98.5 °F (36.9 °C)   SpO2: 100%   Weight: 61.9 kg (136 lb 6.4 oz)   Height: 5' 8\" (1.727 m)            Physical Exam   Constitutional: He is oriented to person, place, and time. He appears well-developed and well-nourished. No distress. Pleasant, talkative, well-appearing  male   HENT:   Head: Normocephalic and atraumatic. Right Ear: External ear normal.   Left Ear: External ear normal.   Eyes: Conjunctivae are normal. No scleral icterus. Neck: Neck supple. No tracheal deviation present. Cardiovascular: Normal rate, regular rhythm and normal heart sounds. Exam reveals no gallop and no friction rub. No murmur heard. Strong radial pulses   Pulmonary/Chest: Effort normal and breath sounds normal. No stridor. No respiratory distress. He has no wheezes. He has no rales. Abdominal: Soft. He exhibits no distension. There is no tenderness.    Soft, benign, no TTP to deep palpation in all quadrants   Musculoskeletal: Normal range of motion. + right lumbar muscular TTP   Neurological: He is alert and oriented to person, place, and time. Skin: Skin is warm and dry. Psychiatric: He has a normal mood and affect. His behavior is normal.   Nursing note and vitals reviewed. MDM  Number of Diagnoses or Management Options  Chills (without fever):   Chronic abdominal pain:   Other chronic pain:   Diagnosis management comments: 50year old male presenting for >1 year of multiple, intermittent complaints. Non-focal exam.  Overall reassuring labs, VS, etc.  Discussed with pt that he will still need PCP f/u for continued investigation of symptoms. Discussed return precautions and PCP f/u.        Amount and/or Complexity of Data Reviewed  Clinical lab tests: ordered and reviewed  Tests in the radiology section of CPT®: ordered and reviewed  Discuss the patient with other providers: yes (Dr. Scar Jimenez, ED attending)          ED Course       Procedures

## 2018-03-01 NOTE — DISCHARGE INSTRUCTIONS
Abdominal Pain: Care Instructions  Your Care Instructions    Abdominal pain has many possible causes. Some aren't serious and get better on their own in a few days. Others need more testing and treatment. If your pain continues or gets worse, you need to be rechecked and may need more tests to find out what is wrong. You may need surgery to correct the problem. Don't ignore new symptoms, such as fever, nausea and vomiting, urination problems, pain that gets worse, and dizziness. These may be signs of a more serious problem. Your doctor may have recommended a follow-up visit in the next 8 to 12 hours. If you are not getting better, you may need more tests or treatment. The doctor has checked you carefully, but problems can develop later. If you notice any problems or new symptoms, get medical treatment right away. Follow-up care is a key part of your treatment and safety. Be sure to make and go to all appointments, and call your doctor if you are having problems. It's also a good idea to know your test results and keep a list of the medicines you take. How can you care for yourself at home? · Rest until you feel better. · To prevent dehydration, drink plenty of fluids, enough so that your urine is light yellow or clear like water. Choose water and other caffeine-free clear liquids until you feel better. If you have kidney, heart, or liver disease and have to limit fluids, talk with your doctor before you increase the amount of fluids you drink. · If your stomach is upset, eat mild foods, such as rice, dry toast or crackers, bananas, and applesauce. Try eating several small meals instead of two or three large ones. · Wait until 48 hours after all symptoms have gone away before you have spicy foods, alcohol, and drinks that contain caffeine. · Do not eat foods that are high in fat. · Avoid anti-inflammatory medicines such as aspirin, ibuprofen (Advil, Motrin), and naproxen (Aleve).  These can cause stomach upset. Talk to your doctor if you take daily aspirin for another health problem. When should you call for help? Call 911 anytime you think you may need emergency care. For example, call if:  ? · You passed out (lost consciousness). ? · You pass maroon or very bloody stools. ? · You vomit blood or what looks like coffee grounds. ? · You have new, severe belly pain. ?Call your doctor now or seek immediate medical care if:  ? · Your pain gets worse, especially if it becomes focused in one area of your belly. ? · You have a new or higher fever. ? · Your stools are black and look like tar, or they have streaks of blood. ? · You have unexpected vaginal bleeding. ? · You have symptoms of a urinary tract infection. These may include:  ¨ Pain when you urinate. ¨ Urinating more often than usual.  ¨ Blood in your urine. ? · You are dizzy or lightheaded, or you feel like you may faint. ? Watch closely for changes in your health, and be sure to contact your doctor if:  ? · You are not getting better after 1 day (24 hours). Where can you learn more? Go to http://ginetteSiemenssheron.info/. Enter R798 in the search box to learn more about \"Abdominal Pain: Care Instructions. \"  Current as of: March 20, 2017  Content Version: 11.4  © 9147-7891 [employer]. Care instructions adapted under license by [employer] (which disclaims liability or warranty for this information). If you have questions about a medical condition or this instruction, always ask your healthcare professional. [place] [address] any warranty or liability for your use of this information. Chronic Pain: Care Instructions  Your Care Instructions    Chronic pain is pain that lasts a long time (months or even years) and may or may not have a clear cause. It is different from acute pain, which usually does have a clear cause-like an injury or illness-and gets better over time. Chronic pain:  · Lasts over time but may vary from day to day. · Does not go away despite efforts to end it. · May disrupt your sleep and lead to fatigue. · May cause depression or anxiety. · May make your muscles tense, causing more pain. · Can disrupt your work, hobbies, home life, and relationships with friends and family. Chronic pain is a very real condition. It is not just in your head. Treatment can help and usually includes several methods used together, such as medicines, physical therapy, exercise, and other treatments. Learning how to relax and changing negative thought patterns can also help you cope. Chronic pain is complex. Taking an active role in your treatment will help you better manage your pain. Tell your doctor if you have trouble dealing with your pain. You may have to try several things before you find what works best for you. Follow-up care is a key part of your treatment and safety. Be sure to make and go to all appointments, and call your doctor if you are having problems. It's also a good idea to know your test results and keep a list of the medicines you take. How can you care for yourself at home? · Pace yourself. Break up large jobs into smaller tasks. Save harder tasks for days when you have less pain, or go back and forth between hard tasks and easier ones. Take rest breaks. · Relax, and reduce stress. Relaxation techniques such as deep breathing or meditation can help. · Keep moving. Gentle, daily exercise can help reduce pain over the long run. Try low- or no-impact exercises such as walking, swimming, and stationary biking. Do stretches to stay flexible. · Try heat, cold packs, and massage. · Get enough sleep. Chronic pain can make you tired and drain your energy. Talk with your doctor if you have trouble sleeping because of pain. · Think positive. Your thoughts can affect your pain level.  Do things that you enjoy to distract yourself when you have pain instead of focusing on the pain. See a movie, read a book, listen to music, or spend time with a friend. · If you think you are depressed, talk to your doctor about treatment. · Keep a daily pain diary. Record how your moods, thoughts, sleep patterns, activities, and medicine affect your pain. You may find that your pain is worse during or after certain activities or when you are feeling a certain emotion. Having a record of your pain can help you and your doctor find the best ways to treat your pain. · Take pain medicines exactly as directed. ¨ If the doctor gave you a prescription medicine for pain, take it as prescribed. ¨ If you are not taking a prescription pain medicine, ask your doctor if you can take an over-the-counter medicine. Reducing constipation caused by pain medicine  · Include fruits, vegetables, beans, and whole grains in your diet each day. These foods are high in fiber. · Drink plenty of fluids, enough so that your urine is light yellow or clear like water. If you have kidney, heart, or liver disease and have to limit fluids, talk with your doctor before you increase the amount of fluids you drink. · If your doctor recommends it, get more exercise. Walking is a good choice. Bit by bit, increase the amount you walk every day. Try for at least 30 minutes on most days of the week. · Schedule time each day for a bowel movement. A daily routine may help. Take your time and do not strain when having a bowel movement. When should you call for help? Call your doctor now or seek immediate medical care if:  ? · Your pain gets worse or is out of control. ? · You feel down or blue, or you do not enjoy things like you once did. You may be depressed, which is common in people with chronic pain. Depression can be treated. ? · You have vomiting or cramps for more than 2 hours. ? Watch closely for changes in your health, and be sure to contact your doctor if:  ? · You cannot sleep because of pain.    ? · You are very worried or anxious about your pain. ? · You have trouble taking your pain medicine. ? · You have any concerns about your pain medicine. ? · You have trouble with bowel movements, such as:  ¨ No bowel movement in 3 days. ¨ Blood in the anal area, in your stool, or on the toilet paper. ¨ Diarrhea for more than 24 hours. Where can you learn more? Go to http://ginette-sheron.info/. Enter N004 in the search box to learn more about \"Chronic Pain: Care Instructions. \"  Current as of: October 14, 2016  Content Version: 11.4  © 2029-3685 [employer]. Care instructions adapted under license by [employer] (which disclaims liability or warranty for this information). If you have questions about a medical condition or this instruction, always ask your healthcare professional. [place] [address] any warranty or liability for your use of this information. Lightheadedness or Faintness: Care Instructions  Your Care Instructions  Lightheadedness is a feeling that you are about to faint or \"pass out. \" You do not feel as if you or your surroundings are moving. It is different from vertigo, which is the feeling that you or things around you are spinning or tilting. Lightheadedness usually goes away or gets better when you lie down. If lightheadedness gets worse, it can lead to a fainting spell. It is common to feel lightheaded from time to time. Lightheadedness usually is not caused by a serious problem. It often is caused by a short-lasting drop in blood pressure and blood flow to your head that occurs when you get up too quickly from a seated or lying position. Follow-up care is a key part of your treatment and safety. Be sure to make and go to all appointments, and call your doctor if you are having problems. It's also a good idea to know your test results and keep a list of the medicines you take. How can you care for yourself at home?   · Breny Hickman down for 1 or 2 minutes when you feel lightheaded. After lying down, sit up slowly and remain sitting for 1 to 2 minutes before slowly standing up. · Avoid movements, positions, or activities that have made you lightheaded in the past.  · Get plenty of rest, especially if you have a cold or flu, which can cause lightheadedness. · Make sure you drink plenty of fluids, especially if you have a fever or have been sweating. · Do not drive or put yourself and others in danger while you feel lightheaded. When should you call for help? Call 911 anytime you think you may need emergency care. For example, call if:  ? · You have symptoms of a stroke. These may include:  ¨ Sudden numbness, tingling, weakness, or loss of movement in your face, arm, or leg, especially on only one side of your body. ¨ Sudden vision changes. ¨ Sudden trouble speaking. ¨ Sudden confusion or trouble understanding simple statements. ¨ Sudden problems with walking or balance. ¨ A sudden, severe headache that is different from past headaches. ? · You have symptoms of a heart attack. These may include:  ¨ Chest pain or pressure, or a strange feeling in the chest.  ¨ Sweating. ¨ Shortness of breath. ¨ Nausea or vomiting. ¨ Pain, pressure, or a strange feeling in the back, neck, jaw, or upper belly or in one or both shoulders or arms. ¨ Lightheadedness or sudden weakness. ¨ A fast or irregular heartbeat. After you call 911, the  may tell you to chew 1 adult-strength or 2 to 4 low-dose aspirin. Wait for an ambulance. Do not try to drive yourself. ? Watch closely for changes in your health, and be sure to contact your doctor if:  ? · Your lightheadedness gets worse or does not get better with home care. Where can you learn more? Go to http://ginette-sheron.info/. Enter N977 in the search box to learn more about \"Lightheadedness or Faintness: Care Instructions. \"  Current as of: March 20, 2017  Content Version: 11.4  © 4340-8056 [employer]. Care instructions adapted under license by [employer] (which disclaims liability or warranty for this information). If you have questions about a medical condition or this instruction, always ask your healthcare professional. [address] any warranty or liability for your use of this information.

## 2018-03-01 NOTE — ED TRIAGE NOTES
Triage note: Pt states he has been experiencing cold hands, RLQ pain, weakness for several weeks. Pt has been seen by PCP but states \"I don't have time to go to all of the different specialists\".

## 2018-03-07 ENCOUNTER — OFFICE VISIT (OUTPATIENT)
Dept: FAMILY MEDICINE CLINIC | Age: 48
End: 2018-03-07

## 2018-03-07 VITALS
DIASTOLIC BLOOD PRESSURE: 70 MMHG | TEMPERATURE: 98.1 F | BODY MASS INDEX: 20.76 KG/M2 | RESPIRATION RATE: 17 BRPM | SYSTOLIC BLOOD PRESSURE: 116 MMHG | WEIGHT: 137 LBS | OXYGEN SATURATION: 100 % | HEART RATE: 75 BPM | HEIGHT: 68 IN

## 2018-03-07 DIAGNOSIS — R42 DIZZINESS: Primary | ICD-10-CM

## 2018-03-07 DIAGNOSIS — E03.9 ACQUIRED HYPOTHYROIDISM: ICD-10-CM

## 2018-03-07 DIAGNOSIS — M54.2 CERVICALGIA: ICD-10-CM

## 2018-03-07 DIAGNOSIS — R20.9 COLD EXTREMITIES: ICD-10-CM

## 2018-03-07 RX ORDER — AZITHROMYCIN 250 MG/1
TABLET, FILM COATED ORAL
COMMUNITY
Start: 2018-02-13 | End: 2018-06-08 | Stop reason: ALTCHOICE

## 2018-03-07 NOTE — MR AVS SNAPSHOT
[address] 
[phone] Patient: Travon Monk MRN: [id number]:[id number] Visit Information Date & Time Provider Department Dept. Phone Encounter #  
 3/7/2018  3:45 PM Rogelio Paul, NATHANIEL [facility] [phone] [id number] Follow-up Instructions Return if symptoms worsen or fail to improve. Upcoming Health Maintenance Date Due DTaP/Tdap/Td series (1 - Tdap) 9/1/1989 Allergies as of 3/7/2018  Review Complete On: 3/7/2018 By: Mathew Elias LPN No Known Allergies Current Immunizations  Never Reviewed No immunizations on file. Not reviewed this visit You Were Diagnosed With   
  
 Codes Comments Orthostatic lightheadedness    -  Primary ICD-10-CM: D81 ICD-9-CM: 780.4 Cold extremities     ICD-10-CM: R68.89 ICD-9-CM: 780.99 Acquired hypothyroidism     ICD-10-CM: E03.9 ICD-9-CM: 244.9 Cervicalgia     ICD-10-CM: M54.2 ICD-9-CM: 723.1 Vitals BP Pulse Temp Resp Height(growth percentile) Weight(growth percentile) 116/70 (BP 1 Location: Right arm, BP Patient Position: Sitting) 75 98.1 °F (36.7 °C) (Oral) 17 5' 8\" (1.727 m) 137 lb (62.1 kg) SpO2 BMI Smoking Status 100% 20.83 kg/m2 Never Smoker Vitals History BMI and BSA Data Body Mass Index Body Surface Area  
 20.83 kg/m 2 1.73 m 2 Preferred Pharmacy Pharmacy Name Phone [id number] Nissa Brooks, [address] [place] [address] [phone] Your Updated Medication List  
  
   
This list is accurate as of 3/7/18  4:28 PM.  Always use your most recent med list.  
  
  
  
  
 azithromycin 250 mg tablet Commonly known as:  ZITHROMAX  
  
 calcium carbonate 200 mg calcium (500 mg) Chew Commonly known as:  TUMS Take 1 Tab by mouth as needed. cholecalciferol 1,000 unit tablet Commonly known as:  VITAMIN D3 Take 1 Tab by mouth two (2) times a day. cyclobenzaprine 5 mg tablet Commonly known as:  FLEXERIL Take 1 Tab by mouth three (3) times daily as needed for Muscle Spasm(s). levothyroxine 25 mcg tablet Commonly known as:  SYNTHROID  
TAKE 1 TABLET BY MOUTH EVERY DAY BEFORE BREAKFAST We Performed the Following REFERRAL TO CARDIOLOGY [GAB18 Custom] REFERRAL TO NEUROLOGY [BVB46 Custom] Follow-up Instructions Return if symptoms worsen or fail to improve. Referral Information Referral ID Referred By Referred To  
  
 [id number] Carole Agrawal, [address] [facility] Justina Wheatley, [address] Phone: [phone] Fax: [phone] Visits Status Start Date End Date 1 New Request 3/7/18 3/7/19 If your referral has a status of pending review or denied, additional information will be sent to support the outcome of this decision. Referral ID Referred By Referred To  
 [id number] [place]  
   [address] [facility] [address] [facility], [address] Phone: [phone] Fax: [phone] Visits Status Start Date End Date 1 New Request 3/7/18 3/7/19 If your referral has a status of pending review or denied, additional information will be sent to support the outcome of this decision. Patient Instructions Dizziness: Care Instructions Your Care Instructions Dizziness is the feeling of unsteadiness or fuzziness in your head. It is different than having vertigo, which is a feeling that the room is spinning or that you are moving or falling. It is also different from lightheadedness, which is the feeling that you are about to faint. It can be hard to know what causes dizziness.  Some people feel dizzy when they have migraine headaches. Sometimes bouts of flu can make you feel dizzy. Some medical conditions, such as heart problems or high blood pressure, can make you feel dizzy. Many medicines can cause dizziness, including medicines for high blood pressure, pain, or anxiety. If a medicine causes your symptoms, your doctor may recommend that you stop or change the medicine. If it is a problem with your heart, you may need medicine to help your heart work better. If there is no clear reason for your symptoms, your doctor may suggest watching and waiting for a while to see if the dizziness goes away on its own. Follow-up care is a key part of your treatment and safety. Be sure to make and go to all appointments, and call your doctor if you are having problems. It's also a good idea to know your test results and keep a list of the medicines you take. How can you care for yourself at home? · If your doctor recommends or prescribes medicine, take it exactly as directed. Call your doctor if you think you are having a problem with your medicine. · Do not drive while you feel dizzy. · Try to prevent falls. Steps you can take include: ¨ Using nonskid mats, adding grab bars near the tub, and using night-lights. ¨ Clearing your home so that walkways are free of anything you might trip on. ¨ Letting family and friends know that you have been feeling dizzy. This will help them know how to help you. When should you call for help? Call 911 anytime you think you may need emergency care. For example, call if: 
? · You passed out (lost consciousness). ? · You have dizziness along with symptoms of a heart attack. These may include: ¨ Chest pain or pressure, or a strange feeling in the chest. 
¨ Sweating. ¨ Shortness of breath. ¨ Nausea or vomiting. ¨ Pain, pressure, or a strange feeling in the back, neck, jaw, or upper belly or in one or both shoulders or arms. ¨ Lightheadedness or sudden weakness. ¨ A fast or irregular heartbeat. ? · You have symptoms of a stroke. These may include: 
¨ Sudden numbness, tingling, weakness, or loss of movement in your face, arm, or leg, especially on only one side of your body. ¨ Sudden vision changes. ¨ Sudden trouble speaking. ¨ Sudden confusion or trouble understanding simple statements. ¨ Sudden problems with walking or balance. ¨ A sudden, severe headache that is different from past headaches. ?Call your doctor now or seek immediate medical care if: 
? · You feel dizzy and have a fever, headache, or ringing in your ears. ? · You have new or increased nausea and vomiting. ? · Your dizziness does not go away or comes back. ? Watch closely for changes in your health, and be sure to contact your doctor if: 
? · You do not get better as expected. Where can you learn more? Go to http://ginette-sheron.info/. Enter U386 in the search box to learn more about \"Dizziness: Care Instructions. \" Current as of: March 20, 2017 Content Version: 11.4 © 9235-5924 [employer]. Care instructions adapted under license by [employer] (which disclaims liability or warranty for this information). If you have questions about a medical condition or this instruction, always ask your healthcare professional. [address] any warranty or liability for your use of this information. Introducing hospitals & HEALTH SERVICES! Dear Elysia Hung: Thank you for requesting a [employer] account. Our records indicate that you already have an active [employer] account. You can access your account anytime at [email or web address] Did you know that you can access your hospital and ER discharge instructions at any time in [employer]? You can also review all of your test results from your hospital stay or ER visit. Additional Information If you have questions, please visit the Frequently Asked Questions section of the [employer] website at [email or web address] FitWithMe. [employer]e [email or web address]/. Remember, [employer] is NOT to be used for urgent needs. For medical emergencies, dial 911. Now available from your iPhone and Android! Please provide this summary of care documentation to your next provider. Your primary care clinician is listed as Claire Blanco. If you have any questions after today's visit, please call [phone].

## 2018-03-07 NOTE — PROGRESS NOTES
Chief Complaint   Patient presents with   [facility] Follow Up     [facility]- 3/1/18- abdominal pain, chills without fever     1. Have you been to the ER, urgent care clinic since your last visit? Hospitalized since your last visit? Yes- [facility]- 3/1/18- abdominal pain, chills without fever    2. Have you seen or consulted any other health care providers outside of the [address] [place] [address] since your last visit? Include any pap smears or colon screening.  No

## 2018-03-07 NOTE — PROGRESS NOTES
[facility] Note    Elle Garrido is a 50 y.o. male who was seen in clinic today (3/7/2018). Subjective:  Vertigo  Patient returns with continued complaint of dizziness, body chills and cold extremities. The symptoms started a few years ago and are unchanged. The attacks occur several times per week and last a few minutes. Positions that worsen symptoms: standing up makes dizziness worse. Previous workup/treatments: Blood work: negative. Patient was started on Synthroid without improvement of symptoms. His recent thyroid levels, Vitamin B12/Folate, Vitamin D, and HIV in 10/2017 were normal. Previous autoimmune testing negative. He denies numbness, weakness or claudication.      Patient is in physical therapy for lumbar back and neck pain with some improvement. Prior to Admission medications    Medication Sig Start Date End Date Taking? Authorizing Provider   levothyroxine (SYNTHROID) 25 mcg tablet TAKE 1 TABLET BY MOUTH EVERY DAY BEFORE BREAKFAST 4/21/17  Yes Andrea Pena NP   cholecalciferol (VITAMIN D3) 1,000 unit tablet Take 1 Tab by mouth two (2) times a day. 9/20/16  Yes Shasha Aguilar MD   calcium carbonate (TUMS) 200 mg calcium (500 mg) chew Take 1 Tab by mouth as needed. Yes Historical Provider   azithromycin (ZITHROMAX) 250 mg tablet  2/13/18   Historical Provider   cyclobenzaprine (FLEXERIL) 5 mg tablet Take 1 Tab by mouth three (3) times daily as needed for Muscle Spasm(s). 2/2/18   Jailyn Knight NP          No Known Allergies        ROS  See HPI    Objective:   Physical Exam   Constitutional: He is oriented to person, place, and time. He appears well-developed and well-nourished. Neck: Normal range of motion. Neck supple. No JVD present. Carotid bruit is not present. No thyromegaly present. Cardiovascular: Normal rate, regular rhythm and intact distal pulses. Exam reveals no gallop and no friction rub. No murmur heard.   Strong peripheral pulses. Hands feel cold to touch. Pulmonary/Chest: Effort normal and breath sounds normal. No respiratory distress. Musculoskeletal: He exhibits no edema. Lymphadenopathy:     He has no cervical adenopathy. Neurological: He is alert and oriented to person, place, and time. Psychiatric: He has a normal mood and affect. His behavior is normal.   Nursing note and vitals reviewed. Visit Vitals    /70 (BP 1 Location: Right arm, BP Patient Position: Sitting)    Pulse 75    Temp 98.1 °F (36.7 °C) (Oral)    Resp 17    Ht 5' 8\" (1.727 m)    Wt 137 lb (62.1 kg)    SpO2 100%    BMI 20.83 kg/m2       Assessment & Plan:  Diagnoses and all orders for this visit:    1. Dizziness  Nonspecific complaint with vague symptoms. Etiology unclear. Given the chronic nature of his complaint will request neurology and cardiology consult. Advised patient to increase hydration.   -     REFERRAL TO NEUROLOGY  -     REFERRAL TO CARDIOLOGY    2. Cold extremities  Consider Raynaud's phenomenon vs RSD. Lab testing negative for autoimmune but can also request rheumatology evaluation.   -     REFERRAL TO NEUROLOGY  -     REFERRAL TO CARDIOLOGY    3. Acquired hypothyroidism  TSH within therapeutic range, no dosage change. 4. Cervicalgia  Improving, continue PT. I have discussed the diagnosis with the patient and the intended plan as seen in the above orders. The patient has received an after-visit summary along with patient information handout. I have discussed medication side effects and warnings with the patient as well. Follow-up Disposition:  Return if symptoms worsen or fail to improve.         Tomas Peralta NP

## 2018-03-07 NOTE — PATIENT INSTRUCTIONS
Dizziness: Care Instructions  Your Care Instructions  Dizziness is the feeling of unsteadiness or fuzziness in your head. It is different than having vertigo, which is a feeling that the room is spinning or that you are moving or falling. It is also different from lightheadedness, which is the feeling that you are about to faint. It can be hard to know what causes dizziness. Some people feel dizzy when they have migraine headaches. Sometimes bouts of flu can make you feel dizzy. Some medical conditions, such as heart problems or high blood pressure, can make you feel dizzy. Many medicines can cause dizziness, including medicines for high blood pressure, pain, or anxiety. If a medicine causes your symptoms, your doctor may recommend that you stop or change the medicine. If it is a problem with your heart, you may need medicine to help your heart work better. If there is no clear reason for your symptoms, your doctor may suggest watching and waiting for a while to see if the dizziness goes away on its own. Follow-up care is a key part of your treatment and safety. Be sure to make and go to all appointments, and call your doctor if you are having problems. It's also a good idea to know your test results and keep a list of the medicines you take. How can you care for yourself at home? · If your doctor recommends or prescribes medicine, take it exactly as directed. Call your doctor if you think you are having a problem with your medicine. · Do not drive while you feel dizzy. · Try to prevent falls. Steps you can take include:  ¨ Using nonskid mats, adding grab bars near the tub, and using night-lights. ¨ Clearing your home so that walkways are free of anything you might trip on. ¨ Letting family and friends know that you have been feeling dizzy. This will help them know how to help you. When should you call for help? Call 911 anytime you think you may need emergency care.  For example, call if:  ? · You passed out (lost consciousness). ? · You have dizziness along with symptoms of a heart attack. These may include:  ¨ Chest pain or pressure, or a strange feeling in the chest.  ¨ Sweating. ¨ Shortness of breath. ¨ Nausea or vomiting. ¨ Pain, pressure, or a strange feeling in the back, neck, jaw, or upper belly or in one or both shoulders or arms. ¨ Lightheadedness or sudden weakness. ¨ A fast or irregular heartbeat. ? · You have symptoms of a stroke. These may include:  ¨ Sudden numbness, tingling, weakness, or loss of movement in your face, arm, or leg, especially on only one side of your body. ¨ Sudden vision changes. ¨ Sudden trouble speaking. ¨ Sudden confusion or trouble understanding simple statements. ¨ Sudden problems with walking or balance. ¨ A sudden, severe headache that is different from past headaches. ?Call your doctor now or seek immediate medical care if:  ? · You feel dizzy and have a fever, headache, or ringing in your ears. ? · You have new or increased nausea and vomiting. ? · Your dizziness does not go away or comes back. ? Watch closely for changes in your health, and be sure to contact your doctor if:  ? · You do not get better as expected. Where can you learn more? Go to http://ginette-shreon.info/. Enter K754 in the search box to learn more about \"Dizziness: Care Instructions. \"  Current as of: March 20, 2017  Content Version: 11.4  © 7746-7971 [employer]. Care instructions adapted under license by [employer] (which disclaims liability or warranty for this information). If you have questions about a medical condition or this instruction, always ask your healthcare professional. [place] [address] any warranty or liability for your use of this information.

## 2018-03-19 ENCOUNTER — OFFICE VISIT (OUTPATIENT)
Dept: NEUROLOGY | Age: 48
End: 2018-03-19

## 2018-03-19 VITALS
OXYGEN SATURATION: 98 % | WEIGHT: 137 LBS | SYSTOLIC BLOOD PRESSURE: 130 MMHG | DIASTOLIC BLOOD PRESSURE: 80 MMHG | HEART RATE: 77 BPM | RESPIRATION RATE: 18 BRPM | BODY MASS INDEX: 20.83 KG/M2

## 2018-03-19 DIAGNOSIS — M54.2 CERVICALGIA: ICD-10-CM

## 2018-03-19 DIAGNOSIS — R55 POSTURAL DIZZINESS WITH PRESYNCOPE: Primary | ICD-10-CM

## 2018-03-19 DIAGNOSIS — R42 POSTURAL DIZZINESS WITH PRESYNCOPE: Primary | ICD-10-CM

## 2018-03-19 DIAGNOSIS — R29.2 HYPERREFLEXIA: ICD-10-CM

## 2018-03-19 NOTE — PROGRESS NOTES
[facility]   NEW PATIENT EVALUATION/CONSULTATION       PATIENT NAME: Jadiel Harper    MRN: [id number]    REASON FOR CONSULTATION: Dizziness    03/19/18      Previous records (physician notes, laboratory reports, and radiology reports) and imaging studies were reviewed and summarized. My recommendations will be communicated back to the patient's physician(s) via electronic medical record and/or by [facility] [facility] mail. HISTORY OF PRESENT ILLNESS:  Jadiel Harper is a 50 y.o. right handed male presenting for evaluation of dizziness. Sx started 1.5 years ago, stable since onset. Episodes are described as lightheadedness when standing, not accompanied by nausea, vomiting, palpitations, tunneling of vision. He feels as if he may pass out. He feels weak all over and his hands/feet feel very cold. +Associated anxiety. No syncope. Events do not occur when sitting. Denies vertigo/room spinning. No focal weakness, numbness, vision/speech deficits, headaches associated with above events. Frequency is currently every 1-2 weeks. Presence of cold extremities is daily.     Prior cardiac evaluations have included: None, cardiology appt scheduled for 3/26/18  Imaging complete: None  Medications for current sx: None    Other ROS:   +cervicalgia  +LBP    PAST MEDICAL HISTORY:  Past Medical History:   Diagnosis Date    Acquired hypothyroidism 4/10/2017    GERD (gastroesophageal reflux disease)        PAST SURGICAL HISTORY:  Past Surgical History:   Procedure Laterality Date    HX ENDOSCOPY         FAMILY HISTORY:   Family History   Problem Relation Age of Onset    Thyroid Disease Mother     Elevated Lipids Mother     No Known Problems Father          SOCIAL HISTORY:  Social History     Social History    Marital status:      Spouse name: N/A    Number of children: N/A    Years of education: N/A     Social History Main Topics    Smoking status: Never Smoker    Smokeless tobacco: Never Used    Alcohol use No    Drug use: No    Sexual activity: Yes     Partners: Female     Birth control/ protection: Condom     Other Topics Concern    None     Social History Narrative         MEDICATIONS:   Current Outpatient Prescriptions   Medication Sig Dispense Refill    azithromycin (ZITHROMAX) 250 mg tablet       cyclobenzaprine (FLEXERIL) 5 mg tablet Take 1 Tab by mouth three (3) times daily as needed for Muscle Spasm(s). 30 Tab 1    levothyroxine (SYNTHROID) 25 mcg tablet TAKE 1 TABLET BY MOUTH EVERY DAY BEFORE BREAKFAST 30 Tab 11    cholecalciferol (VITAMIN D3) 1,000 unit tablet Take 1 Tab by mouth two (2) times a day. 60 Tab 3    calcium carbonate (TUMS) 200 mg calcium (500 mg) chew Take 1 Tab by mouth as needed. ALLERGIES:  No Known Allergies      REVIEW OF SYSTEMS:  10 point ROS reviewed with patient. Please see scanned document under media. PHYSICAL EXAM:  Vital Signs:   Visit Vitals    /80    Pulse 77    Resp 18    Wt 62.1 kg (137 lb)    SpO2 98%    BMI 20.83 kg/m2        General Medical Exam:  General:  Well appearing, comfortable, in no apparent distress. Eyes/ENT: see cranial nerve examination. Neck: No masses appreciated. Full range of motion without tenderness. Respiratory:  Clear to auscultation, good air entry bilaterally. Cardiac:  Regular rate and rhythm, no murmur. GI:  Soft, non-tender, non-distended abdomen. Bowel sounds normal. No masses, organomegaly. Extremities:  No deformities, edema, or skin discoloration. Skin:  No rashes or lesions. Neurological:  · Mental Status:  Alert and oriented to person, place, and time with fluent speech. · Cranial Nerves:   CNII/III/IV/VI: visual fields full to confrontation, EOMI, PERRL, no ptosis or nystagmus.    CN V: Facial sensation intact bilaterally, masseter 5/5   CN VII: Facial muscles symmetric and strong   CN VIII: Hears finger rub well bilaterally, intact vestibular function   CN IX/X: Normal palatal movement   CN XI: Full strength shoulder shrug bilaterally   CN XII: Tongue protrusion full and midline without fasciculation or atrophy  · Motor: Normal tone and muscle bulk with no pronator drift. Individual muscle group testing:  Shoulder abduction:   Left:5/5   Right : 5/5    Shoulder adduction:   Left:5/5   Right : 5/5    Elbow flexion:      Left:5/5   Right : 5/5  Elbow extension:    Left:5/5   Right : 5/5   Wrist flexion:    Left:5/5   Right : 5/5  Wrist extension:    Left:5/5   Right : 5/5  Arm pronation:   Left:5/5   Right : 5/5  Arm supination:   Left:5/5   Right : 5/5    Finger flexion:    Left:5/5   Right : 5/5    Finger extension:   Left:5/5   Right : 5/5   Finger abduction:  Left:5/5   Right : 5/5   Finger adduction:   Left:5/5   Right : 5/5  Hip flexion:     Left:5/5   Right : 5/5         Hip extension:   Left:5/5   Right : 5/5    Knee flexion:     Left:5/5   Right : 5/5    Knee extension:   Left:5/5   Right : 5/5    Dorsiflexion:     Left:5/5   Right : 5/5  Plantar flexion:    Left:5/5   Right : 5/5      · MSRs: No crossed adductors or clonus. RIGHT  LEFT   Brachioradialis 3+ 3+   Biceps 3+ 3+   Triceps 3+ 3+   Knee 3+ 3+   Achilles 3+ 3+        Plantar response Downward Downward          · Sensation: Normal and symmetric perception of pinprick, temperature, light touch, proprioception, and vibration; (-) Romberg. · Coordination: No dysmetria. Normal rapid alternating movements; finger-to-nose and heel-to- shin testing are within normal limits. · Gait: Normal native gait      ASSESSMENT:      ICD-10-CM ICD-9-CM    1. Postural dizziness with presyncope R42 780.4     R55 780.2    2. Cervicalgia M54.2 723.1 MRI CERV SPINE WO CONT   3. Hyperreflexia R29.2 796.1 MRI CERV SPINE WO CONT   50year old male presenting with 1.5 years of episodic postural dizziness/lightheadeness when standing and associated pre-syncope. Episodes are most c/w orthostatic intolerance.   Orthostatic vitals are positive from sitting to standing today. No focal findings on neurological examination today that would suggest primary neurologic etiology for his dizziness. Discussed conservative measures for OI including slow postural changes, increasing hydration/salt intake and compression hose. For his rather diffuse hyperreflexia and intermittent cervicalgia, will complete cervical imaging to assess for cervical stenosis/intrinsic cord pathology. PLAN:  · MRI Cervical WO  · Conservative measures for orthostatic intolerance    Follow-up Disposition:  Return if symptoms worsen or fail to improve. Enzo Su DO  Staff Neurologist  Diplomate, American Board of Psychiatry & Neurology       CC Referring provider:    Yadiel Nair NP

## 2018-03-19 NOTE — MR AVS SNAPSHOT
[facility] [address] [place] [address] 
[phone] Patient: Vangie Vu MRN: [id number] Visit Information Date & Time Provider Department Dept. Phone Encounter #  
 3/19/2018  2:00 PM [facility] at [address] [place] [address] Road [id number] Follow-up Instructions Return if symptoms worsen or fail to improve. Your Appointments 3/26/2018  9:40 AM  
New Patient with Ray Wayne MD  
[facility] ([facility]) Appt Note: appt  
 [address] [place] [address]  
[address] [id number] Upcoming Health Maintenance Date Due DTaP/Tdap/Td series (1 - Tdap) 9/1/1989 Allergies as of 3/19/2018  Review Complete On: 3/19/2018 By: Christi Linares, DO No Known Allergies Current Immunizations  Never Reviewed No immunizations on file. Not reviewed this visit You Were Diagnosed With   
  
 Codes Comments Postural dizziness with presyncope    -  Primary ICD-10-CM: R42, R55 
ICD-9-CM: 780.4, 780.2 Cervicalgia     ICD-10-CM: M54.2 ICD-9-CM: 723.1 Hyperreflexia     ICD-10-CM: R29.2 ICD-9-CM: 796.1 Vitals BP Pulse Resp Weight(growth percentile) SpO2 BMI  
 130/80 77 18 137 lb (62.1 kg) 98% 20.83 kg/m2 Smoking Status Never Smoker Vitals History BMI and BSA Data Body Mass Index Body Surface Area  
 20.83 kg/m 2 1.73 m 2 Preferred Pharmacy Pharmacy Name Phone [id number] [address] [phone] Your Updated Medication List  
  
   
This list is accurate as of 3/19/18  2:28 PM.  Always use your most recent med list.  
  
  
  
  
 azithromycin 250 mg tablet Commonly known as:  Adriano Sanchez calcium carbonate 200 mg calcium (500 mg) Chew Commonly known as:  TUMS Take 1 Tab by mouth as needed. cholecalciferol 1,000 unit tablet Commonly known as:  VITAMIN D3 Take 1 Tab by mouth two (2) times a day. cyclobenzaprine 5 mg tablet Commonly known as:  FLEXERIL Take 1 Tab by mouth three (3) times daily as needed for Muscle Spasm(s). levothyroxine 25 mcg tablet Commonly known as:  SYNTHROID  
TAKE 1 TABLET BY MOUTH EVERY DAY BEFORE BREAKFAST Follow-up Instructions Return if symptoms worsen or fail to improve. To-Do List   
 03/19/2018 Imaging:  MRI CERV SPINE WO CONT Patient Instructions Guidelines for the Treatment of Orthostatic Intolerance (OI) 1. Make all postural changes from lying to sitting or sitting to standing, slowly. 2.  Drink to 2.0 -2.5 L of fluids per day. 3.  Increase sodium in the diet to 3 - 5 g per day. If not helpful and BP is stable, may try 5-7 g per day. 4.  Avoid large meals which can cause low blood pressure during digestion. It is better to eat smaller meals more often than three large meals. 5.  Avoid alcohol. Alcohol and cause blood to pool in the legs which may worsen low blood pressure reactions when standing. This can aggravate OI. 6.  Perform lower extremity exercises to improve strength of the leg muscles. This will help prevent blood from a pooling in the legs when standing and walking. 7.  Raise the head of the bed by 6 to 10 inches. The entire bed must be at an angle. Raising only the head portion of the bed at waist level or using pillows will not be effective. Raising the head of the bed will reduce urine formation overnight and there will be more volume in the circulation in the morning. 8.  During bad days, drink 500 cc of water quickly. This will result in an increased blood pressure within 5 minutes of drinking the water.   The effect will last up to one hour and may improve orthostatic intolerance. 9.  Use custom fitted elastic support stockings. These will reduce a tendency for blood to pool in the legs when standing and may improve orthostatic intolerance. 10.  Use physical counter maneuvers such as leg crossing, squatting, or raising and resting the leg on a chair. These maneuvers increase blood pressure and can improve orthostatic intolerance. Introducing [facility] SERVICES! Dear Demetris Garcia: Thank you for requesting a [employer] account. Our records indicate that you already have an active [employer] account. You can access your account anytime at [email or web address] Did you know that you can access your hospital and ER discharge instructions at any time in [employer]? You can also review all of your test results from your hospital stay or ER visit. Additional Information If you have questions, please visit the Frequently Asked Questions section of the [employer] website at [email or web address] Remember, [employer] is NOT to be used for urgent needs. For medical emergencies, dial 911. Now available from your iPhone and Android! Please provide this summary of care documentation to your next provider. Your primary care clinician is listed as Ariel Rodriguez. If you have any questions after today's visit, please call [phone].

## 2018-03-19 NOTE — PROGRESS NOTES
C/o cold hands/feet, body chills,dizziness happening at the same time has been going on for years  C/o lower back

## 2018-03-19 NOTE — PATIENT INSTRUCTIONS
Guidelines for the Treatment of Orthostatic Intolerance (OI)    1. Make all postural changes from lying to sitting or sitting to standing, slowly. 2.  Drink to 2.0 -2.5 L of fluids per day. 3.  Increase sodium in the diet to 3 - 5 g per day. If not helpful and BP is stable, may try 5-7 g per day. 4.  Avoid large meals which can cause low blood pressure during digestion. It is better to eat smaller meals more often than three large meals. 5.  Avoid alcohol. Alcohol and cause blood to pool in the legs which may worsen low blood pressure reactions when standing. This can aggravate OI. 6.  Perform lower extremity exercises to improve strength of the leg muscles. This will help prevent blood from a pooling in the legs when standing and walking. 7.  Raise the head of the bed by 6 to 10 inches. The entire bed must be at an angle. Raising only the head portion of the bed at waist level or using pillows will not be effective. Raising the head of the bed will reduce urine formation overnight and there will be more volume in the circulation in the morning. 8.  During bad days, drink 500 cc of water quickly. This will result in an increased blood pressure within 5 minutes of drinking the water. The effect will last up to one hour and may improve orthostatic intolerance. 9.  Use custom fitted elastic support stockings. These will reduce a tendency for blood to pool in the legs when standing and may improve orthostatic intolerance. 10.  Use physical counter maneuvers such as leg crossing, squatting, or raising and resting the leg on a chair. These maneuvers increase blood pressure and can improve orthostatic intolerance.

## 2018-03-22 ENCOUNTER — TELEPHONE (OUTPATIENT)
Dept: NEUROLOGY | Age: 48
End: 2018-03-22

## 2018-03-22 NOTE — TELEPHONE ENCOUNTER
Pt calling, stated that he never received a phone call to schedule his MRI. Pt stated he called and left a message as well but has not received a call to schedule imaging yet.

## 2018-03-26 ENCOUNTER — OFFICE VISIT (OUTPATIENT)
Dept: CARDIOLOGY CLINIC | Age: 48
End: 2018-03-26

## 2018-03-26 VITALS
RESPIRATION RATE: 16 BRPM | DIASTOLIC BLOOD PRESSURE: 70 MMHG | WEIGHT: 140 LBS | SYSTOLIC BLOOD PRESSURE: 110 MMHG | HEART RATE: 88 BPM | BODY MASS INDEX: 21.22 KG/M2 | HEIGHT: 68 IN

## 2018-03-26 DIAGNOSIS — R55 POSTURAL DIZZINESS WITH PRESYNCOPE: ICD-10-CM

## 2018-03-26 DIAGNOSIS — E03.9 ACQUIRED HYPOTHYROIDISM: ICD-10-CM

## 2018-03-26 DIAGNOSIS — R20.9 COLD EXTREMITIES: ICD-10-CM

## 2018-03-26 DIAGNOSIS — R42 POSTURAL DIZZINESS WITH PRESYNCOPE: ICD-10-CM

## 2018-03-26 DIAGNOSIS — R42 DIZZINESS: Primary | ICD-10-CM

## 2018-03-26 RX ORDER — OMEPRAZOLE 20 MG/1
1 CAPSULE, DELAYED RELEASE ORAL DAILY
COMMUNITY
Start: 2018-03-08 | End: 2019-02-08 | Stop reason: ALTCHOICE

## 2018-03-26 NOTE — PROGRESS NOTES
[place]: Norma Mendoza  [phone]    History of Present Illness:   Mr. Steve Eastman is a 49 yo M referred by Mike Santana NP for cardiac evaluation with a history of hypothyroid and gastroesophageal reflux disease. He is here due to dizziness. He denies any prior cardiac history. No chest pain, no shortness of breath. With regard to his lightheadedness, it can occur a few times a week lasting 10-15 minutes at a time associated at times with \"body chills\" and presyncope. He denies any rajiv syncope. He also notes he has had cold extremities for a few years. He also saw neurology recently and an MRI is pending. He is compensated on exam with clear lungs and no lower extremity edema. I reviewed his EKG personally from 03/01/2018 demonstrating normal sinus rhythm, heart rate of 80, nonspecific ST-T wave abnormality. Soc hx. No tobacco  Fam hx. No early CAD. Assessment and Plan:   1. Dizziness. Unclear etiology. No clear cardiac contribution. Will obtain a one week loop monitor for further evaluation of possible arrhythmia. There is a concern by neurology this may be orthostatic and they are trying conservative measures with hydration and salt intake and compression hose. 2. Cold extremities. Unclear etiology. Would consider Raynaud's and consider rheumatology evaluation if indicated. He  has a past medical history of Acquired hypothyroidism (4/10/2017) and GERD (gastroesophageal reflux disease). All other systems negative except as above. PE  Vitals:    03/26/18 0929   BP: 110/70   Pulse: 88   Resp: 16   Weight: 140 lb (63.5 kg)   Height: 5' 8\" (1.727 m)    Body mass index is 21.29 kg/(m^2).    General appearance - alert, well appearing, and in no distress  Mental status - affect appropriate to mood  Eyes - sclera anicteric, moist mucous membranes  Neck - supple, no JVD  Chest - clear to auscultation, no wheezes, rales or rhonchi  Heart - normal rate, regular rhythm, normal S1, S2, no murmurs, rubs, clicks or gallops  Abdomen - soft, nontender, nondistended, no masses or organomegaly  Neurological -  no focal deficit  Extremities - peripheral pulses normal, no pedal edema  Skin - cool, no rash    Recent Labs:  Lab Results   Component Value Date/Time    Cholesterol, total 202 (H) 10/06/2017 11:16 AM    HDL Cholesterol 79 10/06/2017 11:16 AM    LDL, calculated 106 (H) 10/06/2017 11:16 AM    Triglyceride 84 10/06/2017 11:16 AM     Lab Results   Component Value Date/Time    Creatinine 0.99 03/01/2018 10:05 AM     Lab Results   Component Value Date/Time    BUN 17 03/01/2018 10:05 AM     Lab Results   Component Value Date/Time    Potassium 3.6 03/01/2018 10:05 AM     Lab Results   Component Value Date/Time    Hemoglobin A1c 5.6 10/06/2017 11:15 AM     Lab Results   Component Value Date/Time    HGB 14.8 03/01/2018 10:05 AM     Lab Results   Component Value Date/Time    PLATELET 199 73/37/1520 10:05 AM       Reviewed:  Past Medical History:   Diagnosis Date    Acquired hypothyroidism 4/10/2017    GERD (gastroesophageal reflux disease)      History   Smoking Status    Never Smoker   Smokeless Tobacco    Never Used     History   Alcohol Use No     No Known Allergies    Current Outpatient Prescriptions   Medication Sig    omeprazole (PRILOSEC) 20 mg capsule Take 1 Cap by mouth daily.  cyclobenzaprine (FLEXERIL) 5 mg tablet Take 1 Tab by mouth three (3) times daily as needed for Muscle Spasm(s).  levothyroxine (SYNTHROID) 25 mcg tablet TAKE 1 TABLET BY MOUTH EVERY DAY BEFORE BREAKFAST    cholecalciferol (VITAMIN D3) 1,000 unit tablet Take 1 Tab by mouth two (2) times a day.  calcium carbonate (TUMS) 200 mg calcium (500 mg) chew Take 1 Tab by mouth as needed.  azithromycin (ZITHROMAX) 250 mg tablet      No current facility-administered medications for this visit.         Ariana Barbour MD  [facility] [place]  [address] [facility] [address]  [address]

## 2018-03-26 NOTE — TELEPHONE ENCOUNTER
Pt requesting a call to let him know if Brittny Reyes has heard anything in regards to scheduling MRI.

## 2018-04-02 ENCOUNTER — HOSPITAL ENCOUNTER (OUTPATIENT)
Dept: MRI IMAGING | Age: 48
Discharge: HOME | End: 2018-04-02
Attending: PSYCHIATRY & NEUROLOGY
Payer: COMMERCIAL

## 2018-04-02 DIAGNOSIS — E03.9 ACQUIRED HYPOTHYROIDISM: ICD-10-CM

## 2018-04-02 DIAGNOSIS — E78.5 DYSLIPIDEMIA: ICD-10-CM

## 2018-04-02 DIAGNOSIS — R29.2 HYPERREFLEXIA: ICD-10-CM

## 2018-04-02 DIAGNOSIS — R73.03 PRE-DIABETES: ICD-10-CM

## 2018-04-02 DIAGNOSIS — N50.82 SCROTAL PAIN: ICD-10-CM

## 2018-04-02 DIAGNOSIS — E55.9 VITAMIN D DEFICIENCY: ICD-10-CM

## 2018-04-02 DIAGNOSIS — M54.2 CERVICALGIA: ICD-10-CM

## 2018-04-02 PROCEDURE — 72141 MRI NECK SPINE W/O DYE: CPT

## 2018-04-02 NOTE — TELEPHONE ENCOUNTER
Refill Request 90 Day    Requested Prescriptions     Pending Prescriptions Disp Refills    levothyroxine (SYNTHROID) 25 mcg tablet 30 Tab 11     Sig: TAKE 1 TABLET BY MOUTH EVERY DAY BEFORE BREAKFAST

## 2018-04-03 ENCOUNTER — TELEPHONE (OUTPATIENT)
Dept: NEUROLOGY | Age: 48
End: 2018-04-03

## 2018-04-03 DIAGNOSIS — N50.82 SCROTAL PAIN: ICD-10-CM

## 2018-04-03 DIAGNOSIS — E78.5 DYSLIPIDEMIA: ICD-10-CM

## 2018-04-03 DIAGNOSIS — R73.03 PRE-DIABETES: ICD-10-CM

## 2018-04-03 DIAGNOSIS — R42 DIZZINESS: Primary | ICD-10-CM

## 2018-04-03 DIAGNOSIS — G93.0 CYST OF BRAIN: ICD-10-CM

## 2018-04-03 DIAGNOSIS — E55.9 VITAMIN D DEFICIENCY: ICD-10-CM

## 2018-04-03 DIAGNOSIS — E03.9 ACQUIRED HYPOTHYROIDISM: ICD-10-CM

## 2018-04-03 RX ORDER — LEVOTHYROXINE SODIUM 25 UG/1
TABLET ORAL
Qty: 30 TAB | Refills: 11 | Status: SHIPPED | OUTPATIENT
Start: 2018-04-03 | End: 2018-04-03 | Stop reason: SDUPTHER

## 2018-04-03 NOTE — TELEPHONE ENCOUNTER
----- Message from Abner Barlow sent at 4/3/2018  2:09 PM EDT -----  Regarding: Dr. Marita Velázquez  Pt stated, he just spoke with provider in regards to MRI. Pt requesting clarification for MRI pt is being requested to do.  Best contact number [phone]

## 2018-04-03 NOTE — TELEPHONE ENCOUNTER
Pharmacy on file verified. Medication filled 4/3/18 for 30 tabs, pharmacy is requesting 90 day supply.    .  Requested Prescriptions     Pending Prescriptions Disp Refills    levothyroxine (SYNTHROID) 25 mcg tablet 30 Tab 11     Sig: TAKE 1 TABLET BY MOUTH EVERY DAY BEFORE BREAKFAST

## 2018-04-03 NOTE — TELEPHONE ENCOUNTER
Spoke with patient, he states Dr.Donaldson called him earlier and discussed MRI results of cervical spine, but wanted to double check that he had a cyst on his brain stem. Informed him according to the report that's what it says, and MRI of the brain was ordered.

## 2018-04-04 RX ORDER — LEVOTHYROXINE SODIUM 25 UG/1
TABLET ORAL
Qty: 90 TAB | Refills: 3 | Status: SHIPPED | OUTPATIENT
Start: 2018-04-04 | End: 2018-06-08 | Stop reason: ALTCHOICE

## 2018-04-06 NOTE — TELEPHONE ENCOUNTER
Pt calling back, has not heard back from imaging, would like to know if nurse can help reach out to scheduling to help schedule appt.

## 2018-04-21 ENCOUNTER — HOSPITAL ENCOUNTER (OUTPATIENT)
Dept: MRI IMAGING | Age: 48
Discharge: HOME | End: 2018-04-21
Attending: PSYCHIATRY & NEUROLOGY
Payer: COMMERCIAL

## 2018-04-21 VITALS — WEIGHT: 140 LBS | BODY MASS INDEX: 21.29 KG/M2

## 2018-04-21 DIAGNOSIS — G93.0 CYST OF BRAIN: ICD-10-CM

## 2018-04-21 DIAGNOSIS — R42 DIZZINESS: ICD-10-CM

## 2018-04-21 PROCEDURE — 74011250636 HC RX REV CODE- 250/636: Performed by: PSYCHIATRY & NEUROLOGY

## 2018-04-21 PROCEDURE — 70553 MRI BRAIN STEM W/O & W/DYE: CPT

## 2018-04-21 PROCEDURE — A9575 INJ GADOTERATE MEGLUMI 0.1ML: HCPCS | Performed by: PSYCHIATRY & NEUROLOGY

## 2018-04-21 RX ORDER — GADOTERATE MEGLUMINE 376.9 MG/ML
13 INJECTION INTRAVENOUS
Status: COMPLETED | OUTPATIENT
Start: 2018-04-21 | End: 2018-04-21

## 2018-04-21 RX ADMIN — GADOTERATE MEGLUMINE 13 ML: 376.9 INJECTION INTRAVENOUS at 16:06

## 2018-04-25 DIAGNOSIS — G93.9 PONTINE LESION: Primary | ICD-10-CM

## 2018-04-25 NOTE — PROGRESS NOTES
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 Will perform f/u imaging in 6 months to re-assess. Pt expressed understanding.       Geoffrey Irvin DO  04/25/18

## 2018-05-09 ENCOUNTER — OFFICE VISIT (OUTPATIENT)
Dept: FAMILY MEDICINE CLINIC | Age: 48
End: 2018-05-09

## 2018-05-09 VITALS
SYSTOLIC BLOOD PRESSURE: 140 MMHG | HEIGHT: 68 IN | TEMPERATURE: 97.8 F | BODY MASS INDEX: 20.92 KG/M2 | RESPIRATION RATE: 18 BRPM | WEIGHT: 138 LBS | HEART RATE: 78 BPM | DIASTOLIC BLOOD PRESSURE: 71 MMHG | OXYGEN SATURATION: 100 %

## 2018-05-09 DIAGNOSIS — B96.81 HELICOBACTER PYLORI GASTRITIS: ICD-10-CM

## 2018-05-09 DIAGNOSIS — M54.2 CERVICALGIA: ICD-10-CM

## 2018-05-09 DIAGNOSIS — R42 POSTURAL DIZZINESS: Primary | ICD-10-CM

## 2018-05-09 DIAGNOSIS — E03.9 ACQUIRED HYPOTHYROIDISM: ICD-10-CM

## 2018-05-09 DIAGNOSIS — R20.9 COLD EXTREMITIES: ICD-10-CM

## 2018-05-09 DIAGNOSIS — K29.70 HELICOBACTER PYLORI GASTRITIS: ICD-10-CM

## 2018-05-09 NOTE — PATIENT INSTRUCTIONS
Dizziness: Care Instructions  Your Care Instructions  Dizziness is the feeling of unsteadiness or fuzziness in your head. It is different than having vertigo, which is a feeling that the room is spinning or that you are moving or falling. It is also different from lightheadedness, which is the feeling that you are about to faint. It can be hard to know what causes dizziness. Some people feel dizzy when they have migraine headaches. Sometimes bouts of flu can make you feel dizzy. Some medical conditions, such as heart problems or high blood pressure, can make you feel dizzy. Many medicines can cause dizziness, including medicines for high blood pressure, pain, or anxiety. If a medicine causes your symptoms, your doctor may recommend that you stop or change the medicine. If it is a problem with your heart, you may need medicine to help your heart work better. If there is no clear reason for your symptoms, your doctor may suggest watching and waiting for a while to see if the dizziness goes away on its own. Follow-up care is a key part of your treatment and safety. Be sure to make and go to all appointments, and call your doctor if you are having problems. It's also a good idea to know your test results and keep a list of the medicines you take. How can you care for yourself at home? · If your doctor recommends or prescribes medicine, take it exactly as directed. Call your doctor if you think you are having a problem with your medicine. · Do not drive while you feel dizzy. · Try to prevent falls. Steps you can take include:  ¨ Using nonskid mats, adding grab bars near the tub, and using night-lights. ¨ Clearing your home so that walkways are free of anything you might trip on. ¨ Letting family and friends know that you have been feeling dizzy. This will help them know how to help you. When should you call for help? Call 911 anytime you think you may need emergency care.  For example, call if:  ? · You passed out (lost consciousness). ? · You have dizziness along with symptoms of a heart attack. These may include:  ¨ Chest pain or pressure, or a strange feeling in the chest.  ¨ Sweating. ¨ Shortness of breath. ¨ Nausea or vomiting. ¨ Pain, pressure, or a strange feeling in the back, neck, jaw, or upper belly or in one or both shoulders or arms. ¨ Lightheadedness or sudden weakness. ¨ A fast or irregular heartbeat. ? · You have symptoms of a stroke. These may include:  ¨ Sudden numbness, tingling, weakness, or loss of movement in your face, arm, or leg, especially on only one side of your body. ¨ Sudden vision changes. ¨ Sudden trouble speaking. ¨ Sudden confusion or trouble understanding simple statements. ¨ Sudden problems with walking or balance. ¨ A sudden, severe headache that is different from past headaches. ?Call your doctor now or seek immediate medical care if:  ? · You feel dizzy and have a fever, headache, or ringing in your ears. ? · You have new or increased nausea and vomiting. ? · Your dizziness does not go away or comes back. ? Watch closely for changes in your health, and be sure to contact your doctor if:  ? · You do not get better as expected. Where can you learn more? Go to http://ginette-sheron.info/. Enter I326 in the search box to learn more about \"Dizziness: Care Instructions. \"  Current as of: March 20, 2017  Content Version: 11.4  © 1049-7953 [employer]. Care instructions adapted under license by [employer] (which disclaims liability or warranty for this information). If you have questions about a medical condition or this instruction, always ask your healthcare professional. [place] [address] any warranty or liability for your use of this information.

## 2018-05-09 NOTE — PROGRESS NOTES
Chief Complaint   Patient presents with    Follow-up     follow up from lab results     1. Have you been to the ER, urgent care clinic since your last visit? Hospitalized since your last visit? No    2. Have you seen or consulted any other health care providers outside of the [address] [place] [address] since your last visit? Include any pap smears or colon screening.  No

## 2018-05-09 NOTE — PROGRESS NOTES
[facility] Note    Denise Osorio is a 50 y.o. male who was seen in clinic today (5/9/2018). Subjective:  Cardiovascular Review:  The patient has no known cardiovascular conditions. Diet and Lifestyle: generally follows a low fat low cholesterol diet, generally follows a low sodium diet, exercises sporadically, nonsmoker  Home BP Monitoring: is not measured at home. Pertinent ROS: taking medications as instructed, no medication side effects noted, no TIA's, no chest pain on exertion, no dyspnea on exertion, no swelling of ankles. Patient underwent EGD and colonoscopy in 3/2018. Colonoscopy was normal and EGD showed H. Pylori and gastritis. He has been treated successfully with PPI and antibiotics. Patient reports continued cold sensation of hands and requests rheumatology evaluation. Patient seen by neurology for dizziness and hyperreflexia. Cervical MRI was normal but incidental lesion noted and follow up MRI ordered. Repeat MRI in 6 months. Cardiology evaluation was also normal. Patient reports improvement of dizziness. MRI Results (most recent):    Results from [place] encounter on 04/21/18   MRI BRAIN W WO CONT   Narrative EXAM:  MRI BRAIN W WO CONT  INDICATION:  eval cystic lesion brainstem  TECHNIQUE: Sagittal T1, axial FLAIR, T2, T1 and gradient echo T2-weighted images  of the head were obtained followed by intravenous infusion 13 mL Dotarem repeat  axial and sagittal 3-D MP rage T1 volume acquisition with thin axial and coronal  reconstructed T1-weighted images and axial diffusion weighted images. Thin  fat-sat axial T1-weighted images through the posterior fossa. COMPARISON: MRI cervical spine 4/2/18  FINDINGS:  In the left tectum of the james the previously described \"lesion\" is again  demonstrated measuring approximate 5 x 4 x 9 mm. There is mild central T2  hyperintensity on FLAIR images and no abnormal enhancement.  It is not a simple  cyst. There is minimal if any mass effect. No adjacent edema. No restricted  diffusion. No other lesions are demonstrated in the brain. The ventricular size and configuration are within normal limits. There is an  incidental cavum septum pellucidum and cavum vergae. Otherwise normal signal demonstrated in the cerebral hemispheres, brain stem and  cerebellum. No abnormal areas of intracranial enhancement. No abnormal diffusion. No evidence of intracranial hemorrhage, infarct, other mass or abnormal  extra-axial fluid collections. Flow voids are present in the vertebral, basilar and carotid artery systems. The craniocervical junction is unremarkable. The structures of the cranial base including paranasal sinuses are   unremarkable. Impression IMPRESSION:   1. Small subcentimeter \"lesion\" left tectum of the james. Nonverbal specific  appearance which could represent anything from a small hamartoma to a low-grade  neoplasm. It does not represent a simple cyst. Continued interval follow-up  suggested. 2. Otherwise MRI of the head is normal.            Prior to Admission medications    Medication Sig Start Date End Date Taking? Authorizing Provider   levothyroxine (SYNTHROID) 25 mcg tablet TAKE 1 TABLET BY MOUTH EVERY DAY BEFORE BREAKFAST 4/4/18  Yes Gwyn Elder NP   omeprazole (PRILOSEC) 20 mg capsule Take 1 Cap by mouth daily. 3/8/18  Yes Historical Provider   cholecalciferol (VITAMIN D3) 1,000 unit tablet Take 1 Tab by mouth two (2) times a day. 9/20/16  Yes Ryann Dumas MD   calcium carbonate (TUMS) 200 mg calcium (500 mg) chew Take 1 Tab by mouth as needed. Yes Historical Provider   azithromycin (ZITHROMAX) 250 mg tablet  2/13/18   Historical Provider   cyclobenzaprine (FLEXERIL) 5 mg tablet Take 1 Tab by mouth three (3) times daily as needed for Muscle Spasm(s).  2/2/18   Gwyn Elder NP          No Known Allergies        ROS  See HPI    Objective:   Physical Exam Constitutional: He is oriented to person, place, and time. He appears well-developed and well-nourished. Neck: Normal range of motion. Neck supple. No JVD present. Carotid bruit is not present. No thyromegaly present. Cardiovascular: Normal rate, regular rhythm and intact distal pulses. Exam reveals no gallop and no friction rub. No murmur heard. Pulmonary/Chest: Effort normal and breath sounds normal. No respiratory distress. Musculoskeletal: He exhibits no edema. Lymphadenopathy:     He has no cervical adenopathy. Neurological: He is alert and oriented to person, place, and time. Psychiatric: He has a normal mood and affect. His behavior is normal.   Nursing note and vitals reviewed. Visit Vitals    /71 (BP 1 Location: Right arm, BP Patient Position: Sitting)    Pulse 78    Temp 97.8 °F (36.6 °C) (Oral)    Resp 18    Ht 5' 8\" (1.727 m)    Wt 138 lb (62.6 kg)    SpO2 100%    BMI 20.98 kg/m2       Assessment & Plan:  Diagnoses and all orders for this visit:    1. Postural dizziness  Increase water intake. 2. Cold extremities  Will request rheumatology evaluation.   -     [facility]    3. Acquired hypothyroidism  TSH within therapeutic range, no dosage change. 4. Helicobacter pylori gastritis  Managed by GI, no changes     5. Cervicalgia  Improved. PT as needed      I have discussed the diagnosis with the patient and the intended plan as seen in the above orders. The patient has received an after-visit summary along with patient information handout. I have discussed medication side effects and warnings with the patient as well. Follow-up Disposition:  Return in about 6 months (around 11/9/2018) for disease management.         Mary Jacobs NP

## 2018-05-09 NOTE — MR AVS SNAPSHOT
[facility] 
 
 
 [address] [facility] [address] 
[phone] Patient: Cristina Becerra MRN: [id number] UPH:7/7/2249 Visit Information Date & Time Provider Department Dept. Phone Encounter #  
 5/9/2018  1:45 PM Donis De Santiago  [place] [id number] Follow-up Instructions Return in about 6 months (around 11/9/2018) for disease management. Upcoming Health Maintenance Date Due DTaP/Tdap/Td series (1 - Tdap) 9/1/1989 Influenza Age 5 to Adult 8/1/2018 Allergies as of 5/9/2018  Review Complete On: 5/9/2018 By: Donis De Santiago NP No Known Allergies Current Immunizations  Never Reviewed No immunizations on file. Not reviewed this visit You Were Diagnosed With   
  
 Codes Comments Postural dizziness    -  Primary ICD-10-CM: Y84 ICD-9-CM: 780.4 Cold extremities     ICD-10-CM: R68.89 ICD-9-CM: 780.99 Acquired hypothyroidism     ICD-10-CM: E03.9 ICD-9-CM: 244.9 Helicobacter pylori gastritis     ICD-10-CM: K29.70, B96.81 
ICD-9-CM: 535.10, 041.86 Cervicalgia     ICD-10-CM: M54.2 ICD-9-CM: 723.1 Vitals BP Pulse Temp Resp Height(growth percentile) Weight(growth percentile) 140/71 (BP 1 Location: Right arm, BP Patient Position: Sitting) 78 97.8 °F (36.6 °C) (Oral) 18 5' 8\" (1.727 m) 138 lb (62.6 kg) SpO2 BMI Smoking Status 100% 20.98 kg/m2 Never Smoker Vitals History BMI and BSA Data Body Mass Index Body Surface Area  
 20.98 kg/m 2 1.73 m 2 Preferred Pharmacy Pharmacy Name Phone [id number], [facility] [place] [address] [phone] Your Updated Medication List  
  
   
This list is accurate as of 5/9/18  2:16 PM.  Always use your most recent med list.  
  
  
  
  
 azithromycin 250 mg tablet Commonly known as:  Lucio Mir calcium carbonate 200 mg calcium (500 mg) Chew Commonly known as:  TUMS Take 1 Tab by mouth as needed. cholecalciferol 1,000 unit tablet Commonly known as:  VITAMIN D3 Take 1 Tab by mouth two (2) times a day. cyclobenzaprine 5 mg tablet Commonly known as:  FLEXERIL Take 1 Tab by mouth three (3) times daily as needed for Muscle Spasm(s). levothyroxine 25 mcg tablet Commonly known as:  SYNTHROID  
TAKE 1 TABLET BY MOUTH EVERY DAY BEFORE BREAKFAST  
  
 omeprazole 20 mg capsule Commonly known as:  PRILOSEC Take 1 Cap by mouth daily. We Performed the Following REFERRAL TO RHEUMATOLOGY [OTV18 Custom] Follow-up Instructions Return in about 6 months (around 11/9/2018) for disease management. Referral Information Referral ID Referred By Referred To  
  
 [id number] [place]   
   [address] [place] [address] Phone: [phone] Fax: [phone] Visits Status Start Date End Date 1 New Request 5/9/18 5/9/19 If your referral has a status of pending review or denied, additional information will be sent to support the outcome of this decision. Patient Instructions Dizziness: Care Instructions Your Care Instructions Dizziness is the feeling of unsteadiness or fuzziness in your head. It is different than having vertigo, which is a feeling that the room is spinning or that you are moving or falling. It is also different from lightheadedness, which is the feeling that you are about to faint. It can be hard to know what causes dizziness. Some people feel dizzy when they have migraine headaches. Sometimes bouts of flu can make you feel dizzy. Some medical conditions, such as heart problems or high blood pressure, can make you feel dizzy. Many medicines can cause dizziness, including medicines for high blood pressure, pain, or anxiety. If a medicine causes your symptoms, your doctor may recommend that you stop or change the medicine. If it is a problem with your heart, you may need medicine to help your heart work better. If there is no clear reason for your symptoms, your doctor may suggest watching and waiting for a while to see if the dizziness goes away on its own. Follow-up care is a key part of your treatment and safety. Be sure to make and go to all appointments, and call your doctor if you are having problems. It's also a good idea to know your test results and keep a list of the medicines you take. How can you care for yourself at home? · If your doctor recommends or prescribes medicine, take it exactly as directed. Call your doctor if you think you are having a problem with your medicine. · Do not drive while you feel dizzy. · Try to prevent falls. Steps you can take include: ¨ Using nonskid mats, adding grab bars near the tub, and using night-lights. ¨ Clearing your home so that walkways are free of anything you might trip on. ¨ Letting family and friends know that you have been feeling dizzy. This will help them know how to help you. When should you call for help? Call 911 anytime you think you may need emergency care. For example, call if: 
? · You passed out (lost consciousness). ? · You have dizziness along with symptoms of a heart attack. These may include: ¨ Chest pain or pressure, or a strange feeling in the chest. 
¨ Sweating. ¨ Shortness of breath. ¨ Nausea or vomiting. ¨ Pain, pressure, or a strange feeling in the back, neck, jaw, or upper belly or in one or both shoulders or arms. ¨ Lightheadedness or sudden weakness. ¨ A fast or irregular heartbeat. ? · You have symptoms of a stroke. These may include: 
¨ Sudden numbness, tingling, weakness, or loss of movement in your face, arm, or leg, especially on only one side of your body. ¨ Sudden vision changes. ¨ Sudden trouble speaking. ¨ Sudden confusion or trouble understanding simple statements. ¨ Sudden problems with walking or balance. ¨ A sudden, severe headache that is different from past headaches. ?Call your doctor now or seek immediate medical care if: 
? · You feel dizzy and have a fever, headache, or ringing in your ears. ? · You have new or increased nausea and vomiting. ? · Your dizziness does not go away or comes back. ? Watch closely for changes in your health, and be sure to contact your doctor if: 
? · You do not get better as expected. Where can you learn more? Go to http://ginette-sheron.info/. Enter Y729 in the search box to learn more about \"Dizziness: Care Instructions. \" Current as of: March 20, 2017 Content Version: 11.4 © 5655-0893 [employer]. Care instructions adapted under license by [employer] (which disclaims liability or warranty for this information). If you have questions about a medical condition or this instruction, always ask your healthcare professional. [place] [address] any warranty or liability for your use of this information. Introducing [facility]! Dear Celina Guerrero: Thank you for requesting a [employer] account. Our records indicate that you already have an active [employer] account. You can access your account anytime at [email or web address] Did you know that you can access your hospital and ER discharge instructions at any time in [employer]? You can also review all of your test results from your hospital stay or ER visit. Additional Information If you have questions, please visit the Frequently Asked Questions section of the [employer] website at [email or web address] Remember, [employer] is NOT to be used for urgent needs. For medical emergencies, dial 911. Now available from your iPhone and Android! Please provide this summary of care documentation to your next provider. Your primary care clinician is listed as Vianca Zavala. If you have any questions after today's visit, please call [phone].

## 2018-05-25 ENCOUNTER — HOSPITAL ENCOUNTER (EMERGENCY)
Age: 48
Discharge: HOME | End: 2018-05-25
Attending: EMERGENCY MEDICINE | Admitting: EMERGENCY MEDICINE
Payer: COMMERCIAL

## 2018-05-25 VITALS
BODY MASS INDEX: 20.44 KG/M2 | HEIGHT: 69 IN | TEMPERATURE: 98.2 F | WEIGHT: 138 LBS | OXYGEN SATURATION: 99 % | RESPIRATION RATE: 16 BRPM | SYSTOLIC BLOOD PRESSURE: 118 MMHG | HEART RATE: 89 BPM | DIASTOLIC BLOOD PRESSURE: 69 MMHG

## 2018-05-25 DIAGNOSIS — E03.9 HYPOTHYROIDISM, UNSPECIFIED TYPE: Primary | ICD-10-CM

## 2018-05-25 LAB
ALBUMIN SERPL-MCNC: 4.5 G/DL (ref 3.5–5)
ALBUMIN/GLOB SERPL: 1.4 {RATIO} (ref 1.1–2.2)
ALP SERPL-CCNC: 80 U/L (ref 45–117)
ALT SERPL-CCNC: 19 U/L (ref 12–78)
ANION GAP SERPL CALC-SCNC: 8 MMOL/L (ref 5–15)
AST SERPL-CCNC: 12 U/L (ref 15–37)
BASOPHILS # BLD: 0 K/UL (ref 0–0.1)
BASOPHILS NFR BLD: 0 % (ref 0–1)
BILIRUB SERPL-MCNC: 0.7 MG/DL (ref 0.2–1)
BUN SERPL-MCNC: 12 MG/DL (ref 6–20)
BUN/CREAT SERPL: 13 (ref 12–20)
CALCIUM SERPL-MCNC: 9.1 MG/DL (ref 8.5–10.1)
CHLORIDE SERPL-SCNC: 104 MMOL/L (ref 97–108)
CO2 SERPL-SCNC: 28 MMOL/L (ref 21–32)
CREAT SERPL-MCNC: 0.94 MG/DL (ref 0.7–1.3)
D DIMER PPP FEU-MCNC: <0.17 MG/L FEU (ref 0–0.65)
DIFFERENTIAL METHOD BLD: NORMAL
EOSINOPHIL # BLD: 0 K/UL (ref 0–0.4)
EOSINOPHIL NFR BLD: 0 % (ref 0–7)
ERYTHROCYTE [DISTWIDTH] IN BLOOD BY AUTOMATED COUNT: 11.7 % (ref 11.5–14.5)
GLOBULIN SER CALC-MCNC: 3.3 G/DL (ref 2–4)
GLUCOSE SERPL-MCNC: 121 MG/DL (ref 65–100)
HCT VFR BLD AUTO: 43.9 % (ref 36.6–50.3)
HGB BLD-MCNC: 14.7 G/DL (ref 12.1–17)
IMM GRANULOCYTES # BLD: 0 K/UL (ref 0–0.04)
IMM GRANULOCYTES NFR BLD AUTO: 0 % (ref 0–0.5)
LYMPHOCYTES # BLD: 1.3 K/UL (ref 0.8–3.5)
LYMPHOCYTES NFR BLD: 23 % (ref 12–49)
MAGNESIUM SERPL-MCNC: 2.2 MG/DL (ref 1.6–2.4)
MCH RBC QN AUTO: 29.1 PG (ref 26–34)
MCHC RBC AUTO-ENTMCNC: 33.5 G/DL (ref 30–36.5)
MCV RBC AUTO: 86.9 FL (ref 80–99)
MONOCYTES # BLD: 0.3 K/UL (ref 0–1)
MONOCYTES NFR BLD: 6 % (ref 5–13)
NEUTS SEG # BLD: 3.9 K/UL (ref 1.8–8)
NEUTS SEG NFR BLD: 71 % (ref 32–75)
NRBC # BLD: 0 K/UL (ref 0–0.01)
NRBC BLD-RTO: 0 PER 100 WBC
PLATELET # BLD AUTO: 249 K/UL (ref 150–400)
PMV BLD AUTO: 9.6 FL (ref 8.9–12.9)
POTASSIUM SERPL-SCNC: 3.5 MMOL/L (ref 3.5–5.1)
PROT SERPL-MCNC: 7.8 G/DL (ref 6.4–8.2)
RBC # BLD AUTO: 5.05 M/UL (ref 4.1–5.7)
SODIUM SERPL-SCNC: 140 MMOL/L (ref 136–145)
T4 FREE SERPL-MCNC: 1.1 NG/DL (ref 0.8–1.5)
TROPONIN I SERPL-MCNC: <0.04 NG/ML
TSH SERPL DL<=0.05 MIU/L-ACNC: 5.6 UIU/ML (ref 0.36–3.74)
WBC # BLD AUTO: 5.5 K/UL (ref 4.1–11.1)

## 2018-05-25 PROCEDURE — 96360 HYDRATION IV INFUSION INIT: CPT

## 2018-05-25 PROCEDURE — 74011250636 HC RX REV CODE- 250/636: Performed by: EMERGENCY MEDICINE

## 2018-05-25 PROCEDURE — 93005 ELECTROCARDIOGRAM TRACING: CPT

## 2018-05-25 PROCEDURE — 83735 ASSAY OF MAGNESIUM: CPT | Performed by: EMERGENCY MEDICINE

## 2018-05-25 PROCEDURE — 84443 ASSAY THYROID STIM HORMONE: CPT | Performed by: PHYSICIAN ASSISTANT

## 2018-05-25 PROCEDURE — 99284 EMERGENCY DEPT VISIT MOD MDM: CPT

## 2018-05-25 PROCEDURE — 36415 COLL VENOUS BLD VENIPUNCTURE: CPT | Performed by: PHYSICIAN ASSISTANT

## 2018-05-25 PROCEDURE — 80053 COMPREHEN METABOLIC PANEL: CPT | Performed by: PHYSICIAN ASSISTANT

## 2018-05-25 PROCEDURE — 85025 COMPLETE CBC W/AUTO DIFF WBC: CPT | Performed by: PHYSICIAN ASSISTANT

## 2018-05-25 PROCEDURE — 84439 ASSAY OF FREE THYROXINE: CPT | Performed by: PHYSICIAN ASSISTANT

## 2018-05-25 PROCEDURE — 85379 FIBRIN DEGRADATION QUANT: CPT | Performed by: EMERGENCY MEDICINE

## 2018-05-25 PROCEDURE — 84484 ASSAY OF TROPONIN QUANT: CPT | Performed by: PHYSICIAN ASSISTANT

## 2018-05-25 RX ADMIN — SODIUM CHLORIDE 1000 ML: 900 INJECTION, SOLUTION INTRAVENOUS at 15:11

## 2018-05-25 NOTE — DISCHARGE INSTRUCTIONS

## 2018-05-25 NOTE — ED PROVIDER NOTES
HPI Comments: 50 y.o. male with past medical history significant for hypothyroidism and GERD,  who presents ambulatory to the ED with cc of lightheadedness. Pt reports intermittent lightheadedness with associated cold intolerance over the past year. He states he has had current episode of these symptoms x 3-4 days with new palpitations described as \"rapid heart rate\" starting today. Per pt, \"my hands and feet are cold. \" He denies known cause or exacerbating factors of his lightheadedness. He notes some improvement of symptoms with rest. Pt reports he has had extensive workup; he has been evaluated by Cardiology, Neurology, and his PCP, with negative labs, head MRI, and neck MRI. He notes his Cardiologist recommended f/u with Rheumatology for cold intolerance. Pt adds he has been seeing PT for chronic back pain caused by his work. He notes he takes Prilosec, recommended by his GI for GERD. He denies hx of surgery. Pt denies other symptoms at this time. There are no other acute medical concerns at this time. Social Hx: denies Tobacco use, denies EtOH use, denies Illicit Drug use  Significant FMHx: denies  PCP: Eleni Delarosa NP  Cardiologist: Laine Quezada MD  Neurologist: Yeison Adkins DO    Note written by Layla Moreau, as dictated by Cortez Wasserman MD 2:52 PM      The history is provided by the patient. No  was used.         Past Medical History:   Diagnosis Date    Acquired hypothyroidism 4/10/2017    GERD (gastroesophageal reflux disease)     Helicobacter pylori gastritis 03/2018       Past Surgical History:   Procedure Laterality Date    HX COLONOSCOPY  03/2018    Dr. Lizeth Pérez, repeat 10 years    HX ENDOSCOPY  03/2018    Dr. Lizeth Pérez         Family History:   Problem Relation Age of Onset    Thyroid Disease Mother     Elevated Lipids Mother     No Known Problems Father        Social History     Social History    Marital status:      Spouse name: N/A   [facility] Number of children: N/A    Years of education: N/A     Occupational History    Not on file. Social History Main Topics    Smoking status: Never Smoker    Smokeless tobacco: Never Used    Alcohol use No    Drug use: No    Sexual activity: Yes     Partners: Female     Birth control/ protection: Condom     Other Topics Concern    Not on file     Social History Narrative         ALLERGIES: Review of patient's allergies indicates no known allergies. Review of Systems   Constitutional: Negative for activity change, chills and fever. HENT: Negative for nosebleeds, sore throat, trouble swallowing and voice change. Eyes: Negative for visual disturbance. Respiratory: Negative for shortness of breath. Cardiovascular: Positive for palpitations. Negative for chest pain. Gastrointestinal: Negative for abdominal pain, constipation, diarrhea and nausea. Endocrine: Positive for cold intolerance. Genitourinary: Negative for difficulty urinating, dysuria, hematuria and urgency. Musculoskeletal: Negative for back pain, neck pain and neck stiffness. Skin: Negative for color change. Allergic/Immunologic: Negative for immunocompromised state. Neurological: Positive for light-headedness. Negative for dizziness, seizures, syncope, weakness, numbness and headaches. Psychiatric/Behavioral: Negative for behavioral problems, confusion, hallucinations, self-injury and suicidal ideas. Vitals:    05/25/18 1416   BP: 127/75   Pulse: (!) 125   Resp: 18   Temp: 98.4 °F (36.9 °C)   SpO2: 100%   Weight: 62.6 kg (138 lb)   Height: 5' 9\" (1.753 m)            Physical Exam   Constitutional: He is oriented to person, place, and time. He appears well-developed and well-nourished. No distress. Well-appearing middle-aged male in no acute distress   HENT:   Head: Normocephalic and atraumatic. Eyes: Pupils are equal, round, and reactive to light. Neck: Normal range of motion. Neck supple.    Cardiovascular: Regular rhythm and normal heart sounds. Tachycardia present. Exam reveals no gallop and no friction rub. No murmur heard. Pulmonary/Chest: Effort normal and breath sounds normal. No respiratory distress. He has no wheezes. clear breath sounds   Abdominal: Soft. Bowel sounds are normal. He exhibits no distension. There is no tenderness. There is no rebound and no guarding. Musculoskeletal: Normal range of motion. Neurological: He is alert and oriented to person, place, and time. Pulses, motor, and sensation intact   Skin: No rash noted. He is not diaphoretic. Cool upper and lower distal extremities   Psychiatric: He has a normal mood and affect. His behavior is normal. Judgment and thought content normal.   Nursing note and vitals reviewed. Note written by Layla Morales, as dictated by Evens Hunt MD 2:52 PM      MDM    This is a 47-year-old male with past medical history, review of systems, physical exam as above, presenting with complaints of lightheadedness, cold intolerance, which is received evaluation by cardiology, neurology, gastroenterology, including MRI of the head and neck, endoscopy. He endorses the symptoms are intermittent, he presented to the emergency department, as they were occurring once again today. Today's noted however to be tachycardic upon arrival. Physical exam remarkable for well-appearing middle-age male in no acute distress, with clear breath sounds, tachycardia without murmurs gallops or rubs, soft nontender abdomen, cool upper and lower distal extremities, pulses motor and sensation intact. Patient has had extensive workup, however with new tachycardia today. Will redraw labs, including electrolytes, provide fluid bolus, obtain EKG, and make a disposition based the patient's diagnostics and response to therapy. ED Course       Procedures    CONSULT NOTE:  4:35 PM Evens Hunt MD spoke with Dr. Messi Watson, Consult for Cardiology.   Discussed available diagnostic tests and clinical findings. Dr. Kin Shelton does not recommend any action regarding pt's tachycardia. 4:43 PM  Pt's heart rate improved, unremarkable lab work with the exception of elevated TSH, suspect thyroid component to his symptoms. Will d/c to f/u with PCP and endocrinology.

## 2018-05-25 NOTE — ED NOTES
2:19 PM  I have evaluated the patient as the Provider in Triage. I have reviewed His vital signs and the triage nurse assessment. I have talked with the patient and any available family and advised that I am the provider in triage and have ordered the appropriate study to initiate their work up based on the clinical presentation during my assessment. I have advised that the patient will be accommodated in the Main ED as soon as possible. I have also requested to contact the triage nurse or myself immediately if the patient experiences any changes in their condition during this brief waiting period. Here for 3-4 days of lightheadedness with standing. Tachycardic in triage. Pt seen in the ED in March for similar symptoms, normal HR at that time.     ALYCIA Guzman

## 2018-05-25 NOTE — ED TRIAGE NOTES
Pt reports dizziness when he gets out of bed or from a sitting position. Pt also reports a tightness in his abdomen and left testicular pain. Pt has elevated heart rate in triage of 125 but denies chest pain or SOB.

## 2018-05-26 LAB
ATRIAL RATE: 115 BPM
CALCULATED P AXIS, ECG09: 79 DEGREES
CALCULATED R AXIS, ECG10: 72 DEGREES
CALCULATED T AXIS, ECG11: 69 DEGREES
DIAGNOSIS, 93000: NORMAL
P-R INTERVAL, ECG05: 148 MS
Q-T INTERVAL, ECG07: 334 MS
QRS DURATION, ECG06: 96 MS
QTC CALCULATION (BEZET), ECG08: 462 MS
VENTRICULAR RATE, ECG03: 115 BPM

## 2018-06-08 ENCOUNTER — OFFICE VISIT (OUTPATIENT)
Dept: INTERNAL MEDICINE CLINIC | Age: 48
End: 2018-06-08

## 2018-06-08 VITALS
HEIGHT: 69 IN | TEMPERATURE: 98.7 F | BODY MASS INDEX: 19.73 KG/M2 | SYSTOLIC BLOOD PRESSURE: 112 MMHG | HEART RATE: 65 BPM | RESPIRATION RATE: 16 BRPM | DIASTOLIC BLOOD PRESSURE: 82 MMHG | WEIGHT: 133.2 LBS | OXYGEN SATURATION: 99 %

## 2018-06-08 DIAGNOSIS — G89.29 CHRONIC MIDLINE LOW BACK PAIN WITHOUT SCIATICA: ICD-10-CM

## 2018-06-08 DIAGNOSIS — M54.50 CHRONIC MIDLINE LOW BACK PAIN WITHOUT SCIATICA: ICD-10-CM

## 2018-06-08 DIAGNOSIS — E03.9 ACQUIRED HYPOTHYROIDISM: Primary | ICD-10-CM

## 2018-06-08 DIAGNOSIS — R42 DIZZINESS: ICD-10-CM

## 2018-06-08 DIAGNOSIS — R00.2 INTERMITTENT PALPITATIONS: ICD-10-CM

## 2018-06-08 DIAGNOSIS — K21.9 GASTROESOPHAGEAL REFLUX DISEASE WITHOUT ESOPHAGITIS: ICD-10-CM

## 2018-06-08 DIAGNOSIS — F32.9 REACTIVE DEPRESSION: ICD-10-CM

## 2018-06-08 RX ORDER — PAROXETINE 10 MG/1
10 TABLET, FILM COATED ORAL DAILY
Qty: 30 TAB | Refills: 0 | Status: SHIPPED | OUTPATIENT
Start: 2018-06-08 | End: 2018-07-01 | Stop reason: SDUPTHER

## 2018-06-08 RX ORDER — BISMUTH SUBSALICYLATE 262 MG
1 TABLET,CHEWABLE ORAL DAILY
COMMUNITY
End: 2022-08-16 | Stop reason: ALTCHOICE

## 2018-06-08 RX ORDER — LEVOTHYROXINE SODIUM 50 UG/1
50 TABLET ORAL
Qty: 30 TAB | Refills: 2 | Status: SHIPPED | OUTPATIENT
Start: 2018-06-08 | End: 2018-09-06

## 2018-06-08 NOTE — PROGRESS NOTES
Written by Joy Brambila, as dictated by Dr. Argenis Knapp MD.    Jim Mcduffie is a 50 y.o. male. HPI  The patient comes in today to establish care. He has been having dizziness, cold extremities, shivering, and cold feet for over 2 years. He also sometimes feels like he will pass out, but he has not. He has also experienced weakness. When he feels these sxs, his heart rate goes up. Sometimes his also feels a little sweaty. He experiences some relief when he lays down or does back relaxation exercises recommended by PT. Drinking tea also helps his sxs some. He has been trying to drink more water recently because he had not been drinking enough before. He has also decreased his coffee consumption. In 03/2018 he was walking and felt that he was going to pass out, so he went to the ED. He has hypothyroidism. He is taking Synthroid 25 mcg 30 min before breakfast, but experiences no relief from his sxs. His last TSH levels were little elevated. He also takes omeprazole 30 min before breakfast. He has no hx of diabetes or problems with his BS. He has been to see a neurologist.He has extensive testing done and  was not something to be concerned about. He also went to a cardiologist and was told that his heart was fine. It was recommended to ask his PCP about a Rheumatology referral.    A loop monitor was ordered for him, but he did not get one because he was told he did not have heart issues and the cost was too much. Recently he has been experiencing a lot of stress because he is going through a divorce. He noticed that his sxs have worsened through this process. They have been  for a year. They have been going to [employer] counseling and he has been to a counselor. He also has a young daughter and he has noticed she is stressed sometimes. He has also lost weight, as he is 133 lbs today, and was 138 in 05/2018.  He is careful when he eats because an endoscopy found gastritis. He takes Tums for this. He also experiences some jaw pain, especially on the L side. He uses a mouth guard and his PT massages the area as well. 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 Has has been taking a multivitamin and vitamin B12. He was not told that his B12 levels were low when he was in the hospital in 05/2018. His vitamin D levels are fine and he is not anemic. Patient Active Problem List   Diagnosis Code    Acquired hypothyroidism E03.9    GERD (gastroesophageal reflux disease) K21.9        Current Outpatient Prescriptions on File Prior to Visit   Medication Sig Dispense Refill    levothyroxine (SYNTHROID) 25 mcg tablet TAKE 1 TABLET BY MOUTH EVERY DAY BEFORE BREAKFAST 90 Tab 3    omeprazole (PRILOSEC) 20 mg capsule Take 1 Cap by mouth daily.  calcium carbonate (TUMS) 200 mg calcium (500 mg) chew Take 1 Tab by mouth as needed.  cholecalciferol (VITAMIN D3) 1,000 unit tablet Take 1 Tab by mouth two (2) times a day. 60 Tab 3     No current facility-administered medications on file prior to visit. Past Medical History:   Diagnosis Date    Acquired hypothyroidism 4/10/2017    GERD (gastroesophageal reflux disease)     Helicobacter pylori gastritis 03/2018       Past Surgical History:   Procedure Laterality Date    HX COLONOSCOPY  03/2018    Dr. Douglas Holbrook, repeat 10 years    HX ENDOSCOPY  03/2018    Dr. Douglas Holbrook       Family History   Problem Relation Age of Onset    Thyroid Disease Mother     Elevated Lipids Mother     No Known Problems Father        Social History     Social History    Marital status:      Spouse name: N/A    Number of children: N/A    Years of education: N/A     Occupational History    Not on file.      Social History Main Topics    Smoking status: Never Smoker    Smokeless tobacco: Never Used    Alcohol use No    Drug use: No    Sexual activity: Yes     Partners: Female     Birth control/ protection: Condom     Other Topics Concern    Not on file     Social History Narrative       Admission on 05/25/2018, Discharged on 05/25/2018   Component Date Value Ref Range Status    WBC 05/25/2018 5.5  4.1 - 11.1 K/uL Final    RBC 05/25/2018 5.05  4. 10 - 5.70 M/uL Final    HGB 05/25/2018 14.7  12.1 - 17.0 g/dL Final    HCT 05/25/2018 43.9  36.6 - 50.3 % Final    MCV 05/25/2018 86.9  80.0 - 99.0 FL Final    MCH 05/25/2018 29.1  26.0 - 34.0 PG Final    MCHC 05/25/2018 33.5  30.0 - 36.5 g/dL Final    RDW 05/25/2018 11.7  11.5 - 14.5 % Final    PLATELET 26/64/0037 164  150 - 400 K/uL Final    MPV 05/25/2018 9.6  8.9 - 12.9 FL Final    NRBC 05/25/2018 0.0  0  WBC Final    ABSOLUTE NRBC 05/25/2018 0.00  0.00 - 0.01 K/uL Final    NEUTROPHILS 05/25/2018 71  32 - 75 % Final    LYMPHOCYTES 05/25/2018 23  12 - 49 % Final    MONOCYTES 05/25/2018 6  5 - 13 % Final    EOSINOPHILS 05/25/2018 0  0 - 7 % Final    BASOPHILS 05/25/2018 0  0 - 1 % Final    IMMATURE GRANULOCYTES 05/25/2018 0  0.0 - 0.5 % Final    ABS. NEUTROPHILS 05/25/2018 3.9  1.8 - 8.0 K/UL Final    ABS. LYMPHOCYTES 05/25/2018 1.3  0.8 - 3.5 K/UL Final    ABS. MONOCYTES 05/25/2018 0.3  0.0 - 1.0 K/UL Final    ABS. EOSINOPHILS 05/25/2018 0.0  0.0 - 0.4 K/UL Final    ABS. BASOPHILS 05/25/2018 0.0  0.0 - 0.1 K/UL Final    ABS. IMM.  GRANS. 05/25/2018 0.0  0.00 - 0.04 K/UL Final    DF 05/25/2018 AUTOMATED    Final    Sodium 05/25/2018 140  136 - 145 mmol/L Final    Potassium 05/25/2018 3.5  3.5 - 5.1 mmol/L Final    Chloride 05/25/2018 104  97 - 108 mmol/L Final    CO2 05/25/2018 28  21 - 32 mmol/L Final    Anion gap 05/25/2018 8  5 - 15 mmol/L Final    Glucose 05/25/2018 121* 65 - 100 mg/dL Final    BUN 05/25/2018 12  6 - 20 MG/DL Final    Creatinine 05/25/2018 0.94  0.70 - 1.30 MG/DL Final    BUN/Creatinine ratio 05/25/2018 13  12 - 20   Final    GFR est AA 05/25/2018 >60  >60 ml/min/1.73m2 Final    GFR est non-AA 05/25/2018 >60  >60 ml/min/1.73m2 Final    Calcium 05/25/2018 9.1  8.5 - 10.1 MG/DL Final    Bilirubin, total 05/25/2018 0.7  0.2 - 1.0 MG/DL Final    ALT (SGPT) 05/25/2018 19  12 - 78 U/L Final    AST (SGOT) 05/25/2018 12* 15 - 37 U/L Final    Alk. phosphatase 05/25/2018 80  45 - 117 U/L Final    Protein, total 05/25/2018 7.8  6.4 - 8.2 g/dL Final    Albumin 05/25/2018 4.5  3.5 - 5.0 g/dL Final    Globulin 05/25/2018 3.3  2.0 - 4.0 g/dL Final    A-G Ratio 05/25/2018 1.4  1.1 - 2.2   Final    Troponin-I, Qt. 05/25/2018 <0.04  <0.05 ng/mL Final    Ventricular Rate 05/25/2018 115  BPM Final    Atrial Rate 05/25/2018 115  BPM Final    P-R Interval 05/25/2018 148  ms Final    QRS Duration 05/25/2018 96  ms Final    Q-T Interval 05/25/2018 334  ms Final    QTC Calculation (Bezet) 05/25/2018 462  ms Final    Calculated P Axis 05/25/2018 79  degrees Final    Calculated R Axis 05/25/2018 72  degrees Final    Calculated T Axis 05/25/2018 69  degrees Final    Diagnosis 05/25/2018    Final                    Value:Sinus tachycardia  When compared with ECG of 01-MAR-2018 10:00,  No significant change was found  Confirmed by Angelique Moreno M.D., Sidonie Single ([id number]) on 5/26/2018 8:56:14 AM      TSH 05/25/2018 5.60* 0.36 - 3.74 uIU/mL Final    T4, Free 05/25/2018 1.1  0.8 - 1.5 NG/DL Final    Magnesium 05/25/2018 2.2  1.6 - 2.4 mg/dL Final    D-dimer 05/25/2018 <0.17  0.00 - 0.65 mg/L FEU Final       Review of Systems   Constitutional: Positive for chills and diaphoresis. Negative for malaise/fatigue. HENT: Negative for congestion. Eyes: Negative for blurred vision and pain. Respiratory: Negative for cough and shortness of breath. Cardiovascular: Positive for palpitations. Negative for chest pain. Gastrointestinal: Negative for abdominal pain and heartburn. Genitourinary: Negative for frequency and urgency. Musculoskeletal: Positive for back pain and joint pain. Negative for myalgias. Neurological: Positive for dizziness, sensory change and weakness. Negative for tingling and headaches. Psychiatric/Behavioral: Positive for depression. Negative for memory loss and substance abuse. The patient is nervous/anxious. Visit Vitals    /82 (BP 1 Location: Right arm, BP Patient Position: Sitting)    Pulse 65    Temp 98.7 °F (37.1 °C) (Oral)    Resp 16    Ht 5' 9\" (1.753 m)    Wt 133 lb 3.2 oz (60.4 kg)    SpO2 99%    BMI 19.67 kg/m2       Physical Exam   Constitutional: He is oriented to person, place, and time. He appears well-developed and well-nourished. No distress. HENT:   Right Ear: External ear normal.   Left Ear: External ear normal.   Eyes: Conjunctivae and EOM are normal. Right eye exhibits no discharge. Left eye exhibits no discharge. Neck: Normal range of motion. Neck supple. Cardiovascular: Normal rate and regular rhythm. Pulmonary/Chest: Effort normal and breath sounds normal. He has no wheezes. Abdominal: Soft. Bowel sounds are normal. There is no tenderness. Lymphadenopathy:     He has no cervical adenopathy. Neurological: He is alert and oriented to person, place, and time. Skin: He is not diaphoretic. Psychiatric: He has a normal mood and affect. His behavior is normal.   tearful   Nursing note and vitals reviewed. ASSESSMENT and PLAN    ICD-10-CM ICD-9-CM    1. Acquired hypothyroidism E03.9 244.9 levothyroxine (SYNTHROID) 50 mcg tablet sent to pharmacy. Increased Synthroid dose to 50 mcg because his TSH levels were elevated. 2. Dizziness R42 780.4 Keep a bar or juice with him so that he has something to eat or drink when he experiences dizziness or like he is going to pass out. If this does not help, we will look into other causes. 3. Gastroesophageal reflux disease without esophagitis K21.9 530.81 He is taking Prilosec 20 mg and Tums for this. Discussed that stress and anxiety can worsen this.    4. Intermittent palpitations R00.2 785.1 May be associated with anxiety or thyroid issues. 5. Reactive depression F32.9 300.4 PARoxetine (PAXIL) 10 mg tablet sent to pharmacy. Paxil prescribed, take at dinner. Try taking at night help with anxiety and sleep. Follow up in 3 weeks. 6. Chronic midline low back pain without sciatica M54.5 724.2 He goes to PT for this. G89.29 338.29         This plan was reviewed with the patient and patient agrees. All questions were answered. This scribe documentation was reviewed by me and accurately reflects the examination and decisions made by me. This note will not be viewable in [address]

## 2018-06-08 NOTE — PROGRESS NOTES
Tetanus 2 years ago  Patient has been having testing for Chills and cold hands and feet. He has been to Neurology. Devon Mcintosh for stomach Issues. Lower abdomen  Chief Complaint   Patient presents with   Brian Zepeda Establish Care     Visit Vitals    /82 (BP 1 Location: Right arm, BP Patient Position: Sitting)    Pulse 65    Temp 98.7 °F (37.1 °C) (Oral)    Resp 16    Ht 5' 9\" (1.753 m)    Wt 133 lb 3.2 oz (60.4 kg)    SpO2 99%    BMI 19.67 kg/m2     1. Have you been to the ER, urgent care clinic since your last visit? Hospitalized since your last visit? Chills and weakness    2. Have you seen or consulted any other health care providers outside of the [address] [place] [address] since your last visit? Include any pap smears or colon screening. Yes

## 2018-06-29 ENCOUNTER — OFFICE VISIT (OUTPATIENT)
Dept: INTERNAL MEDICINE CLINIC | Age: 48
End: 2018-06-29

## 2018-06-29 VITALS
HEIGHT: 69 IN | TEMPERATURE: 98 F | HEART RATE: 83 BPM | WEIGHT: 133 LBS | SYSTOLIC BLOOD PRESSURE: 120 MMHG | BODY MASS INDEX: 19.7 KG/M2 | RESPIRATION RATE: 16 BRPM | DIASTOLIC BLOOD PRESSURE: 86 MMHG | OXYGEN SATURATION: 99 %

## 2018-06-29 DIAGNOSIS — A04.8 H. PYLORI INFECTION: ICD-10-CM

## 2018-06-29 DIAGNOSIS — F41.9 ANXIETY: ICD-10-CM

## 2018-06-29 DIAGNOSIS — K21.9 GASTROESOPHAGEAL REFLUX DISEASE WITHOUT ESOPHAGITIS: ICD-10-CM

## 2018-06-29 DIAGNOSIS — E03.9 ACQUIRED HYPOTHYROIDISM: Primary | ICD-10-CM

## 2018-06-29 RX ORDER — LANOLIN ALCOHOL/MO/W.PET/CERES
1000 CREAM (GRAM) TOPICAL DAILY
COMMUNITY
End: 2022-04-21 | Stop reason: ALTCHOICE

## 2018-06-29 NOTE — PROGRESS NOTES
Chief Complaint   Patient presents with    Thyroid Problem     Patient continues to have low back pain that is going to PT for and Chiropract  Visit Vitals    BP (!) 122/92 (BP 1 Location: Left arm, BP Patient Position: Sitting)    Pulse 83    Temp 98 °F (36.7 °C) (Oral)    Resp 16    Ht 5' 9\" (1.753 m)    Wt 133 lb (60.3 kg)    SpO2 99%    BMI 19.64 kg/m2     1. Have you been to the ER, urgent care clinic since your last visit? Hospitalized since your last visit? No    2. Have you seen or consulted any other health care providers outside of the [address] [place] [address] since your last visit? Include any pap smears or colon screening.  PT

## 2018-06-29 NOTE — MR AVS SNAPSHOT
[facility] [address] 
[phone] Patient: Roberto Davenport MRN: [id number] Visit Information Date & Time Provider Department Dept. Phone Encounter #  
 6/29/2018  9:30 AM Mike Bernardo MD [facility] [phone] [id number] Your Appointments 8/10/2018 11:10 AM  
New Patient with MD Jason [facility] App[facility] Note: np thyroid ref by [facility] ER for elevated throid levels seen on 05/26/18 pcp(NATHANIEL Lobo [phone]) $40CP yd 05/26/18  
 [address] [place] [address]  
  
    
 9/11/2018  9:00 AM  
New Patient with Tremayne Tidwell MD  
[address] ([facility]) Appt Note: Referred by Lele Puckett NP, Cold extremities [facility] [address]  
[phone]  
  
   
 [facility] [address] Upcoming Health Maintenance Date Due DTaP/Tdap/Td series (1 - Tdap) 9/1/1989 Influenza Age 5 to Adult 8/1/2018 Allergies as of 6/29/2018  Review Complete On: 6/29/2018 By: Mike Bernardo MD  
 No Known Allergies Current Immunizations  Never Reviewed No immunizations on file. Not reviewed this visit You Were Diagnosed With   
  
 Codes Comments Acquired hypothyroidism    -  Primary ICD-10-CM: E03.9 ICD-9-CM: 244.9 Anxiety     ICD-10-CM: F41.9 ICD-9-CM: 300.00 Gastroesophageal reflux disease without esophagitis     ICD-10-CM: K21.9 ICD-9-CM: 530.81 H. pylori infection     ICD-10-CM: A04.8 ICD-9-CM: 041.86 Vitals BP Pulse Temp Resp Height(growth percentile) Weight(growth percentile)  120/86 (BP 1 Location: Left arm, BP Patient Position: Sitting) 83 98 °F (36.7 °C) (Oral) 16 5' 9\" (1.753 m) 133 lb (60.3 kg) SpO2 BMI Smoking Status 99% 19.64 kg/m2 Never Smoker Vitals History BMI and BSA Data Body Mass Index Body Surface Area 19.64 kg/m 2 1.71 m 2 Preferred Pharmacy Pharmacy Name Phone [id number], [address] [place] [address] Express[address]  [phone] Your Updated Medication List  
  
   
This list is accurate as of 6/29/18 10:34 AM.  Always use your most recent med list.  
  
  
  
  
 calcium carbonate 200 mg calcium (500 mg) Lang Gomez Commonly known as:  TUMS Take 1 Tab by mouth as needed. cholecalciferol 1,000 unit tablet Commonly known as:  VITAMIN D3 Take 1 Tab by mouth two (2) times a day. levothyroxine 50 mcg tablet Commonly known as:  SYNTHROID Take 1 Tab by mouth Daily (before breakfast) for 90 days. multivitamin tablet Commonly known as:  ONE A DAY Take 1 Tab by mouth daily. omeprazole 20 mg capsule Commonly known as:  PRILOSEC Take 1 Cap by mouth daily. PARoxetine 10 mg tablet Commonly known as:  PAXIL Take 1 Tab by mouth daily for 30 days. VITAMIN B-12 1,000 mcg tablet Generic drug:  cyanocobalamin Take 1,000 mcg by mouth daily. Introducing [facility]! Dear Qian Carter: Thank you for requesting a [employer] account. Our records indicate that you already have an active [employer] account. You can access your account anytime at [email or web address] Did you know that you can access your hospital and ER discharge instructions at any time in [employer]? You can also review all of your test results from your hospital stay or ER visit. Additional Information If you have questions, please visit the Frequently Asked Questions section of the [employer] website at [email or web address] Remember, [employer] is NOT to be used for urgent needs. For medical emergencies, dial 911. Now available from your iPhone and Android! Please provide this summary of care documentation to your next provider. Your primary care clinician is listed as Omkar Quispe. If you have any questions after today's visit, please call [phone].

## 2018-06-29 NOTE — PROGRESS NOTES
Written by Michael Swain, as dictated by Dr. Geovanna Thapa MD.    Nash Vuong is a 50 y.o. male. HPI  The patient presents today for a thyroid follow-up. He is compliant on levothyroxine 50 mcg, which believes is working. He has been tracking his sxs and has noticed that they have improved. He still experiences coldness in his hands and feet, but it has significantly improved. He is working from home, so when he notices his sxs he lays down on a yoga mat and wraps himself in a blanket. He has been taking Prilosec mg. He went to Dr. Sonia Brandon (GI) in 04/2018 and was treated for H. Pylori. He only took his medications for 2 days because the medication was causing him to experience side effects. He has not been taking Paxil 10 mg at night because he was worried that the side effects would mask any side effects or sxs changes from his increased levothyroxine dose. He was seeing a counselor a while ago, but is not currently seeing anyone. He weighs 133 lbs today and has been eating peanut butter to help him gain weight. His BP is high today at 122/92, 120/86 on repeat. He is no longer taking vitamin D3. He started taking a centrum multivitamin a month ago. Patient Active Problem List   Diagnosis Code    Acquired hypothyroidism E03.9    GERD (gastroesophageal reflux disease) K21.9        Current Outpatient Prescriptions on File Prior to Visit   Medication Sig Dispense Refill    multivitamin (ONE A DAY) tablet Take 1 Tab by mouth daily.  levothyroxine (SYNTHROID) 50 mcg tablet Take 1 Tab by mouth Daily (before breakfast) for 90 days. 30 Tab 2    PARoxetine (PAXIL) 10 mg tablet Take 1 Tab by mouth daily for 30 days. 30 Tab 0    omeprazole (PRILOSEC) 20 mg capsule Take 1 Cap by mouth daily.  cholecalciferol (VITAMIN D3) 1,000 unit tablet Take 1 Tab by mouth two (2) times a day.  60 Tab 3    calcium carbonate (TUMS) 200 mg calcium (500 mg) chew Take 1 Tab by mouth as needed. No current facility-administered medications on file prior to visit. Past Medical History:   Diagnosis Date    Acquired hypothyroidism 4/10/2017    GERD (gastroesophageal reflux disease)     Helicobacter pylori gastritis 03/2018       Past Surgical History:   Procedure Laterality Date    HX COLONOSCOPY  03/2018    Dr. Abena Espino, repeat 10 years    HX ENDOSCOPY  03/2018    Dr. Abena Espino       Family History   Problem Relation Age of Onset    Thyroid Disease Mother     Elevated Lipids Mother     No Known Problems Father        Social History     Social History    Marital status:      Spouse name: N/A    Number of children: N/A    Years of education: N/A     Occupational History    Not on file. Social History Main Topics    Smoking status: Never Smoker    Smokeless tobacco: Never Used    Alcohol use No    Drug use: No    Sexual activity: Yes     Partners: Female     Birth control/ protection: Condom     Other Topics Concern    Not on file     Social History Narrative       Admission on 05/25/2018, Discharged on 05/25/2018   Component Date Value Ref Range Status    WBC 05/25/2018 5.5  4.1 - 11.1 K/uL Final    RBC 05/25/2018 5.05  4. 10 - 5.70 M/uL Final    HGB 05/25/2018 14.7  12.1 - 17.0 g/dL Final    HCT 05/25/2018 43.9  36.6 - 50.3 % Final    MCV 05/25/2018 86.9  80.0 - 99.0 FL Final    MCH 05/25/2018 29.1  26.0 - 34.0 PG Final    MCHC 05/25/2018 33.5  30.0 - 36.5 g/dL Final    RDW 05/25/2018 11.7  11.5 - 14.5 % Final    PLATELET 23/65/4668 110  150 - 400 K/uL Final    MPV 05/25/2018 9.6  8.9 - 12.9 FL Final    NRBC 05/25/2018 0.0  0  WBC Final    ABSOLUTE NRBC 05/25/2018 0.00  0.00 - 0.01 K/uL Final    NEUTROPHILS 05/25/2018 71  32 - 75 % Final    LYMPHOCYTES 05/25/2018 23  12 - 49 % Final    MONOCYTES 05/25/2018 6  5 - 13 % Final    EOSINOPHILS 05/25/2018 0  0 - 7 % Final    BASOPHILS 05/25/2018 0  0 - 1 % Final    IMMATURE GRANULOCYTES 05/25/2018 0  0.0 - 0.5 % Final    ABS. NEUTROPHILS 05/25/2018 3.9  1.8 - 8.0 K/UL Final    ABS. LYMPHOCYTES 05/25/2018 1.3  0.8 - 3.5 K/UL Final    ABS. MONOCYTES 05/25/2018 0.3  0.0 - 1.0 K/UL Final    ABS. EOSINOPHILS 05/25/2018 0.0  0.0 - 0.4 K/UL Final    ABS. BASOPHILS 05/25/2018 0.0  0.0 - 0.1 K/UL Final    ABS. IMM. GRANS. 05/25/2018 0.0  0.00 - 0.04 K/UL Final    Sodium 05/25/2018 140  136 - 145 mmol/L Final    Potassium 05/25/2018 3.5  3.5 - 5.1 mmol/L Final    Chloride 05/25/2018 104  97 - 108 mmol/L Final    CO2 05/25/2018 28  21 - 32 mmol/L Final    Anion gap 05/25/2018 8  5 - 15 mmol/L Final    Glucose 05/25/2018 121* 65 - 100 mg/dL Final    BUN 05/25/2018 12  6 - 20 MG/DL Final    Creatinine 05/25/2018 0.94  0.70 - 1.30 MG/DL Final    BUN/Creatinine ratio 05/25/2018 13  12 - 20   Final    GFR est AA 05/25/2018 >60  >60 ml/min/1.73m2 Final    GFR est non-AA 05/25/2018 >60  >60 ml/min/1.73m2 Final    Calcium 05/25/2018 9.1  8.5 - 10.1 MG/DL Final    Bilirubin, total 05/25/2018 0.7  0.2 - 1.0 MG/DL Final    ALT (SGPT) 05/25/2018 19  12 - 78 U/L Final    AST (SGOT) 05/25/2018 12* 15 - 37 U/L Final    Alk.  phosphatase 05/25/2018 80  45 - 117 U/L Final    Protein, total 05/25/2018 7.8  6.4 - 8.2 g/dL Final    Albumin 05/25/2018 4.5  3.5 - 5.0 g/dL Final    Globulin 05/25/2018 3.3  2.0 - 4.0 g/dL Final    A-G Ratio 05/25/2018 1.4  1.1 - 2.2   Final    Troponin-I, Qt. 05/25/2018 <0.04  <0.05 ng/mL Final    Ventricular Rate 05/25/2018 115  BPM Final    Atrial Rate 05/25/2018 115  BPM Final    P-R Interval 05/25/2018 148  ms Final    QRS Duration 05/25/2018 96  ms Final    Q-T Interval 05/25/2018 334  ms Final    QTC Calculation (Bezet) 05/25/2018 462  ms Final    Calculated P Axis 05/25/2018 79  degrees Final    Calculated R Axis 05/25/2018 72  degrees Final    Calculated T Axis 05/25/2018 69  degrees Final                    Value:Sinus tachycardia  When compared with ECG of 01-MAR-2018 10:00,  No significant change was found  Confirmed by Melissa Mae M.D., Jennifer Berrios ([id number]) on 5/26/2018 8:56:14 AM      TSH 05/25/2018 5.60* 0.36 - 3.74 uIU/mL Final    T4, Free 05/25/2018 1.1  0.8 - 1.5 NG/DL Final    Magnesium 05/25/2018 2.2  1.6 - 2.4 mg/dL Final    D-dimer 05/25/2018 <0.17  0.00 - 0.65 mg/L FEU Final       Review of Systems   Constitutional: Negative for malaise/fatigue and weight loss. HENT: Negative for congestion and ear pain. Eyes: Negative for blurred vision and double vision. Cardiovascular: Negative for chest pain, palpitations and leg swelling. Gastrointestinal: Positive for heartburn. Negative for abdominal pain and blood in stool. Genitourinary: Negative for frequency and urgency. Musculoskeletal: Negative for back pain, joint pain and myalgias. Neurological: Negative for headaches. Psychiatric/Behavioral: Negative for depression, memory loss and substance abuse. The patient is nervous/anxious. The patient does not have insomnia. Visit Vitals    /86 (BP 1 Location: Left arm, BP Patient Position: Sitting)    Pulse 83    Temp 98 °F (36.7 °C) (Oral)    Resp 16    Ht 5' 9\" (1.753 m)    Wt 133 lb (60.3 kg)    SpO2 99%    BMI 19.64 kg/m2       Physical Exam   Constitutional: He is oriented to person, place, and time. He appears well-developed and well-nourished. No distress. HENT:   Right Ear: External ear normal.   Left Ear: External ear normal.   Eyes: Conjunctivae and EOM are normal. Right eye exhibits no discharge. Left eye exhibits no discharge. Neck: Normal range of motion. Neck supple. Cardiovascular: Normal rate and regular rhythm. Pulmonary/Chest: Effort normal and breath sounds normal. He has no wheezes. Abdominal: Soft. Bowel sounds are normal. There is no tenderness. Lymphadenopathy:     He has no cervical adenopathy. Neurological: He is alert and oriented to person, place, and time.    Skin: He is not diaphoretic. Psychiatric: He has a normal mood and affect. His behavior is normal.   Nursing note and vitals reviewed. ASSESSMENT and PLAN    ICD-10-CM ICD-9-CM    1. Acquired hypothyroidism E03.9 244.9 He is compliant on levothyroxine 50 mcg. He should follow up at the end of July. 2. Anxiety F41.9 300.00 He should start taking Paxil. He should return on 07/24-07/25 to follow-up on Paxil. He should contact me via [employer] if he is experiencing any sxs or side effects or has any questions. 3. Gastroesophageal reflux disease without esophagitis K21.9 530.81 He is compliant on Prilosec. 4. H. pylori infection A04.8 041.86 He should contact Dr. John Pan office (GI) to inform them that he did not finish his course of abx. This plan was reviewed with the patient and patient agrees. All questions were answered. This scribe documentation was reviewed by me and accurately reflects the examination and decisions made by me. This note will not be viewable in [address]

## 2018-07-01 DIAGNOSIS — F32.9 REACTIVE DEPRESSION: ICD-10-CM

## 2018-07-02 RX ORDER — PAROXETINE 10 MG/1
TABLET, FILM COATED ORAL
Qty: 30 TAB | Refills: 0 | Status: SHIPPED | OUTPATIENT
Start: 2018-07-02 | End: 2018-07-31 | Stop reason: SDUPTHER

## 2018-07-31 DIAGNOSIS — F32.9 REACTIVE DEPRESSION: ICD-10-CM

## 2018-07-31 RX ORDER — PAROXETINE 10 MG/1
TABLET, FILM COATED ORAL
Qty: 30 TAB | Refills: 0 | Status: SHIPPED | OUTPATIENT
Start: 2018-07-31 | End: 2018-08-13 | Stop reason: SDUPTHER

## 2018-08-13 ENCOUNTER — OFFICE VISIT (OUTPATIENT)
Dept: INTERNAL MEDICINE CLINIC | Age: 48
End: 2018-08-13

## 2018-08-13 VITALS
HEIGHT: 69 IN | BODY MASS INDEX: 20.23 KG/M2 | TEMPERATURE: 99.9 F | SYSTOLIC BLOOD PRESSURE: 122 MMHG | WEIGHT: 136.6 LBS | RESPIRATION RATE: 16 BRPM | DIASTOLIC BLOOD PRESSURE: 72 MMHG | OXYGEN SATURATION: 98 % | HEART RATE: 87 BPM

## 2018-08-13 DIAGNOSIS — Z86.19 HISTORY OF HELICOBACTER PYLORI INFECTION: ICD-10-CM

## 2018-08-13 DIAGNOSIS — E03.9 ACQUIRED HYPOTHYROIDISM: Primary | ICD-10-CM

## 2018-08-13 DIAGNOSIS — R29.898 TEMPOROMANDIBULAR JOINT CLICK: ICD-10-CM

## 2018-08-13 DIAGNOSIS — K21.9 GASTROESOPHAGEAL REFLUX DISEASE WITHOUT ESOPHAGITIS: ICD-10-CM

## 2018-08-13 DIAGNOSIS — F32.9 REACTIVE DEPRESSION: ICD-10-CM

## 2018-08-13 RX ORDER — PAROXETINE 10 MG/1
10 TABLET, FILM COATED ORAL DAILY
Qty: 90 TAB | Refills: 0 | Status: SHIPPED | OUTPATIENT
Start: 2018-08-13 | End: 2019-01-16 | Stop reason: SDUPTHER

## 2018-08-13 NOTE — MR AVS SNAPSHOT
[address] [facility] [address] [place] [address] [address] [facility] 
[phone] Patient: Nia  MRN: [id number] Visit Information Date & Time Provider Department Dept. Phone Encounter #  
 2018  2:30 PM Harrison Woods MD [facility] Internal Medicine [phone] [id number] Your Appointments 2018  9:00 AM  
New Patient with Romaine Saleh MD  
[facility] Appt Note: Referred by Niko Redman NP, Cold extremities; Referred by Niko Redman NP, Cold extremities [address] [place] [address]  
[address] [place] [id number] Upcoming Health Maintenance Date Due DTaP/Tdap/Td series (1 - Tdap) 1991 Influenza Age 5 to Adult 2018 Allergies as of 2018  Review Complete On: 2018 By: Harrison Woods MD  
 No Known Allergies Current Immunizations  Never Reviewed No immunizations on file. Not reviewed this visit You Were Diagnosed With   
  
 Codes Comments Acquired hypothyroidism    -  Primary ICD-10-CM: E03.9 ICD-9-CM: 244.9 Reactive depression     ICD-10-CM: F32.9 ICD-9-CM: 300.4 Gastroesophageal reflux disease without esophagitis     ICD-10-CM: K21.9 ICD-9-CM: 530.81 History of Helicobacter pylori infection     ICD-10-CM: Z86.19 ICD-9-CM: V12.09 Temporomandibular joint click     LDG-20-ZA: T51.867 ICD-9-CM: 524.64 Vitals BP Pulse Temp Resp Height(growth percentile) Weight(growth percentile) 122/72 (BP 1 Location: Left arm, BP Patient Position: Sitting) 87 99.9 °F (37.7 °C) (Oral) 16 5' 9\" (1.753 m) 136 lb 9.6 oz (62 kg) SpO2 BMI Smoking Status 98% 20.17 kg/m2 Never Smoker BMI and BSA Data Body Mass Index Body Surface Area  
 20.17 kg/m 2 1.74 m 2 Preferred Pharmacy Pharmacy Name Phone [id number] [address] [place] [address] [phone] Your Updated Medication List  
  
   
This list is accurate as of 8/13/18  3:04 PM.  Always use your most recent med list.  
  
  
  
  
 calcium carbonate 200 mg calcium (500 mg) Carly Ling Commonly known as:  TUMS Take 1 Tab by mouth as needed. cholecalciferol 1,000 unit tablet Commonly known as:  VITAMIN D3 Take 1 Tab by mouth two (2) times a day. levothyroxine 50 mcg tablet Commonly known as:  SYNTHROID Take 1 Tab by mouth Daily (before breakfast) for 90 days. multivitamin tablet Commonly known as:  ONE A DAY Take 1 Tab by mouth daily. omeprazole 20 mg capsule Commonly known as:  PRILOSEC Take 1 Cap by mouth daily. PARoxetine 10 mg tablet Commonly known as:  PAXIL Take 1 Tab by mouth daily for 90 days. VITAMIN B-12 1,000 mcg tablet Generic drug:  cyanocobalamin Take 1,000 mcg by mouth daily. Prescriptions Sent to Pharmacy Refills PARoxetine (PAXIL) 10 mg tablet 0 Sig: Take 1 Tab by mouth daily for 90 days. Class: Normal  
 Pharmacy: [facility]/pharmacy #[id number]- Jessica [facility]s, [address] [place] [address] Ph #: [phone] Route: Oral  
  
We Performed the Following Timothy Joseph [68533 CPT(R)] TSH AND FREE T4 [01107 CPT(R)] Introducing [facility]! Dear iMnerva Kirkpatrick: Thank you for requesting a GodTube account. Our records indicate that you already have an active GodTube account. You can access your account anytime at [email or web address] Did you know that you can access your hospital and ER discharge instructions at any time in GodTube? You can also review all of your test results from your hospital stay or ER visit. Additional Information If you have questions, please visit the Frequently Asked Questions section of the XML[employer] website at [email or web address] [employer]. [email or web address] Remember, XMLAW is NOT to be used for urgent needs. For medical emergencies, dial 911. Now available from your iPhone and Android! Please provide this summary of care documentation to your next provider. Your primary care clinician is listed as Adolfo Valadez. If you have any questions after today's visit, please call [phone].

## 2018-08-13 NOTE — PROGRESS NOTES
Written by Annett Shone, as dictated by Dr. Daniel Marie MD.    Sherri Seaman is a 50 y.o. male. HPI  The patient presents today to follow up on medication adjustments. After starting on levothyroxine 50 mcg he no longer experiences body chills. He notes that sometimes he gets cold in his feet. Overall, he reports that he is feeling much better. He is doing well on Paxil 10 mg at night and is sleeping better. He reports that he is no longer experiencing crying spells. He has also been eating better and that his stomach feels better. He contacted his GI and was instructed to finish one of his abx. He was told to schedule a follow up appointment with Dr. Judson Colin (GI). He weighs 136 lbs today and has gained weight from 133 lbs in 06/2018. He notes that while he was traveling he felt pressure in his R ear. He went to an ENT and was told that he ear is fine. He is no longer experiencing any sxs, but notes that sometimes he feels clicking which is uncomfortable. He has been going to PT for his lower back pain, neck pain, and TMJ, which has been helping. He does not drink ETOH or smoke. Patient notes that things are going alright with his wife, from who he is . Patient Active Problem List   Diagnosis Code    Acquired hypothyroidism E03.9    GERD (gastroesophageal reflux disease) K21.9    History of Helicobacter pylori infection Z86.19        Current Outpatient Prescriptions on File Prior to Visit   Medication Sig Dispense Refill    cyanocobalamin (VITAMIN B-12) 1,000 mcg tablet Take 1,000 mcg by mouth daily.  multivitamin (ONE A DAY) tablet Take 1 Tab by mouth daily.  levothyroxine (SYNTHROID) 50 mcg tablet Take 1 Tab by mouth Daily (before breakfast) for 90 days. 30 Tab 2    omeprazole (PRILOSEC) 20 mg capsule Take 1 Cap by mouth daily.  calcium carbonate (TUMS) 200 mg calcium (500 mg) chew Take 1 Tab by mouth as needed.       cholecalciferol (VITAMIN D3) 1,000 unit tablet Take 1 Tab by mouth two (2) times a day. 60 Tab 3     No current facility-administered medications on file prior to visit. No Known Allergies    Past Medical History:   Diagnosis Date    Acquired hypothyroidism 4/10/2017    GERD (gastroesophageal reflux disease)     Helicobacter pylori gastritis 03/2018       Past Surgical History:   Procedure Laterality Date    HX COLONOSCOPY  03/2018    Dr. Oly Easley, repeat 10 years    HX ENDOSCOPY  03/2018    Dr. Oly Easley       Family History   Problem Relation Age of Onset    Thyroid Disease Mother     Elevated Lipids Mother     No Known Problems Father        Social History     Social History    Marital status:      Spouse name: N/A    Number of children: N/A    Years of education: N/A     Occupational History    Not on file. Social History Main Topics    Smoking status: Never Smoker    Smokeless tobacco: Never Used    Alcohol use No    Drug use: No    Sexual activity: Yes     Partners: Female     Birth control/ protection: Condom     Other Topics Concern    Not on file     Social History Narrative       Admission on 05/25/2018, Discharged on 05/25/2018   Component Date Value Ref Range Status    WBC 05/25/2018 5.5  4.1 - 11.1 K/uL Final    RBC 05/25/2018 5.05  4. 10 - 5.70 M/uL Final    HGB 05/25/2018 14.7  12.1 - 17.0 g/dL Final    HCT 05/25/2018 43.9  36.6 - 50.3 % Final    MCV 05/25/2018 86.9  80.0 - 99.0 FL Final    MCH 05/25/2018 29.1  26.0 - 34.0 PG Final    MCHC 05/25/2018 33.5  30.0 - 36.5 g/dL Final    RDW 05/25/2018 11.7  11.5 - 14.5 % Final    PLATELET 11/33/6008 212  150 - 400 K/uL Final    MPV 05/25/2018 9.6  8.9 - 12.9 FL Final    NRBC 05/25/2018 0.0  0  WBC Final    ABSOLUTE NRBC 05/25/2018 0.00  0.00 - 0.01 K/uL Final    NEUTROPHILS 05/25/2018 71  32 - 75 % Final    LYMPHOCYTES 05/25/2018 23  12 - 49 % Final    MONOCYTES 05/25/2018 6  5 - 13 % Final    EOSINOPHILS 05/25/2018 0  0 - 7 % Final    BASOPHILS 05/25/2018 0  0 - 1 % Final    IMMATURE GRANULOCYTES 05/25/2018 0  0.0 - 0.5 % Final    ABS. NEUTROPHILS 05/25/2018 3.9  1.8 - 8.0 K/UL Final    ABS. LYMPHOCYTES 05/25/2018 1.3  0.8 - 3.5 K/UL Final    ABS. MONOCYTES 05/25/2018 0.3  0.0 - 1.0 K/UL Final    ABS. EOSINOPHILS 05/25/2018 0.0  0.0 - 0.4 K/UL Final    ABS. BASOPHILS 05/25/2018 0.0  0.0 - 0.1 K/UL Final    ABS. IMM. GRANS. 05/25/2018 0.0  0.00 - 0.04 K/UL Final    DF 05/25/2018 AUTOMATED    Final    Sodium 05/25/2018 140  136 - 145 mmol/L Final    Potassium 05/25/2018 3.5  3.5 - 5.1 mmol/L Final    Chloride 05/25/2018 104  97 - 108 mmol/L Final    CO2 05/25/2018 28  21 - 32 mmol/L Final    Anion gap 05/25/2018 8  5 - 15 mmol/L Final    Glucose 05/25/2018 121* 65 - 100 mg/dL Final    BUN 05/25/2018 12  6 - 20 MG/DL Final    Creatinine 05/25/2018 0.94  0.70 - 1.30 MG/DL Final    BUN/Creatinine ratio 05/25/2018 13  12 - 20   Final    GFR est AA 05/25/2018 >60  >60 ml/min/1.73m2 Final    GFR est non-AA 05/25/2018 >60  >60 ml/min/1.73m2 Final    Calcium 05/25/2018 9.1  8.5 - 10.1 MG/DL Final    Bilirubin, total 05/25/2018 0.7  0.2 - 1.0 MG/DL Final    ALT (SGPT) 05/25/2018 19  12 - 78 U/L Final    AST (SGOT) 05/25/2018 12* 15 - 37 U/L Final    Alk.  phosphatase 05/25/2018 80  45 - 117 U/L Final    Protein, total 05/25/2018 7.8  6.4 - 8.2 g/dL Final    Albumin 05/25/2018 4.5  3.5 - 5.0 g/dL Final    Globulin 05/25/2018 3.3  2.0 - 4.0 g/dL Final    A-G Ratio 05/25/2018 1.4  1.1 - 2.2   Final    Troponin-I, Qt. 05/25/2018 <0.04  <0.05 ng/mL Final    Ventricular Rate 05/25/2018 115  BPM Final    Atrial Rate 05/25/2018 115  BPM Final    P-R Interval 05/25/2018 148  ms Final    QRS Duration 05/25/2018 96  ms Final    Q-T Interval 05/25/2018 334  ms Final    QTC Calculation (Bezet) 05/25/2018 462  ms Final    Calculated P Axis 05/25/2018 79  degrees Final    Calculated R Axis 05/25/2018 72  degrees Final    Calculated T Axis 05/25/2018 69  degrees Final    Diagnosis 05/25/2018    Final                    Value:Sinus tachycardia  When compared with ECG of 01-MAR-2018 10:00,  No significant change was found  Confirmed by Noemy Perales M.D., Catherene Najjar ([id number]) on 5/26/2018 8:56:14 AM      TSH 05/25/2018 5.60* 0.36 - 3.74 uIU/mL Final    T4, Free 05/25/2018 1.1  0.8 - 1.5 NG/DL Final    Magnesium 05/25/2018 2.2  1.6 - 2.4 mg/dL Final    D-dimer 05/25/2018 <0.17  0.00 - 0.65 mg/L FEU Final       Review of Systems   Constitutional: Negative for malaise/fatigue. HENT: Negative for congestion. Eyes: Negative for blurred vision and pain. Respiratory: Negative for cough and shortness of breath. Cardiovascular: Negative for chest pain and palpitations. Gastrointestinal: Positive for heartburn. Negative for abdominal pain. Genitourinary: Negative for frequency and urgency. Musculoskeletal: Positive for back pain, joint pain and neck pain. Negative for myalgias. Neurological: Negative for dizziness, tingling, sensory change, weakness and headaches. Psychiatric/Behavioral: Positive for depression. Negative for memory loss and substance abuse. Visit Vitals    /72 (BP 1 Location: Left arm, BP Patient Position: Sitting)    Pulse 87    Temp 99.9 °F (37.7 °C) (Oral)    Resp 16    Ht 5' 9\" (1.753 m)    Wt 136 lb 9.6 oz (62 kg)    SpO2 98%    BMI 20.17 kg/m2       Physical Exam   Constitutional: He is oriented to person, place, and time. He appears well-developed and well-nourished. No distress. HENT:   Right Ear: External ear normal.   Left Ear: External ear normal.   Eyes: Conjunctivae and EOM are normal. Right eye exhibits no discharge. Left eye exhibits no discharge. Neck: Normal range of motion. Neck supple. Cardiovascular: Normal rate and regular rhythm. Pulmonary/Chest: Effort normal and breath sounds normal. He has no wheezes. Abdominal: Soft.  Bowel sounds are normal. There is no tenderness. Lymphadenopathy:     He has no cervical adenopathy. Neurological: He is alert and oriented to person, place, and time. Skin: He is not diaphoretic. Psychiatric: He has a normal mood and affect. His behavior is normal.   Nursing note and vitals reviewed. ASSESSMENT and PLAN    ICD-10-CM ICD-9-CM    1. Acquired hypothyroidism E03.9 244.9 TSH AND FREE T4    I will check his TSH to determine if his synthroid dosage needs to be further  adjusted. 2. Reactive depression F32.9 300.4 PARoxetine (PAXIL) 10 mg tablet sent to pharmacy. Paxil 10 mg refilled. 3. Gastroesophageal reflux disease without esophagitis K21.9 530.81 He is compliant on Prilosec. 4. History of Helicobacter pylori infection Z86.19 V12.09 H PYLORI AG, STOOL    H Pylori stool test ordered. 5. Temporomandibular joint click X96.135 903.24 If his joint continues to bother him, he can return to PT. This plan was reviewed with the patient and patient agrees. All questions were answered. This scribe documentation was reviewed by me and accurately reflects the examination and decisions made by me. This note will not be viewable in [address]

## 2018-08-13 NOTE — PROGRESS NOTES
Chief Complaint   Patient presents with    Thyroid Problem     Visit Vitals    /72 (BP 1 Location: Left arm, BP Patient Position: Sitting)    Pulse 87    Temp 99.9 °F (37.7 °C) (Oral)    Resp 16    Ht 5' 9\" (1.753 m)    Wt 136 lb 9.6 oz (62 kg)    SpO2 98%    BMI 20.17 kg/m2     1. Have you been to the ER, urgent care clinic since your last visit? Hospitalized since your last visit? No    2. Have you seen or consulted any other health care providers outside of the [facility] since your last visit? Include any pap smears or colon screening.  ENT,PT for back and neck

## 2018-08-14 DIAGNOSIS — E03.9 ACQUIRED HYPOTHYROIDISM: Primary | ICD-10-CM

## 2018-08-14 LAB
T4 FREE SERPL-MCNC: 1.72 NG/DL (ref 0.82–1.77)
TSH SERPL DL<=0.005 MIU/L-ACNC: 2.16 UIU/ML (ref 0.45–4.5)

## 2018-08-14 RX ORDER — LEVOTHYROXINE SODIUM 50 UG/1
50 TABLET ORAL
Qty: 90 TAB | Refills: 0 | Status: SHIPPED | OUTPATIENT
Start: 2018-08-14 | End: 2018-09-02 | Stop reason: SDUPTHER

## 2018-08-24 LAB — H PYLORI AG STL QL IA: POSITIVE

## 2018-08-26 NOTE — PROGRESS NOTES
Emmanuelle Brown, your stool test came back positive for H.pylori infection. You need another course of Antibiotic. Please make an appointment.

## 2018-08-31 ENCOUNTER — OFFICE VISIT (OUTPATIENT)
Dept: INTERNAL MEDICINE CLINIC | Age: 48
End: 2018-08-31

## 2018-08-31 VITALS
TEMPERATURE: 99.6 F | HEIGHT: 69 IN | HEART RATE: 64 BPM | DIASTOLIC BLOOD PRESSURE: 64 MMHG | BODY MASS INDEX: 20.17 KG/M2 | SYSTOLIC BLOOD PRESSURE: 108 MMHG | RESPIRATION RATE: 16 BRPM | OXYGEN SATURATION: 98 % | WEIGHT: 136.2 LBS

## 2018-08-31 DIAGNOSIS — A04.8 HELICOBACTER PYLORI INFECTION: Primary | ICD-10-CM

## 2018-08-31 DIAGNOSIS — K21.9 GASTROESOPHAGEAL REFLUX DISEASE WITHOUT ESOPHAGITIS: ICD-10-CM

## 2018-08-31 RX ORDER — OMEPRAZOLE 20 MG/1
20 CAPSULE, DELAYED RELEASE ORAL DAILY
Qty: 30 CAP | Refills: 0 | Status: SHIPPED | OUTPATIENT
Start: 2018-08-31 | End: 2018-09-30

## 2018-08-31 NOTE — PROGRESS NOTES
Written by Fransisco Chappell, as dictated by Dr. Mynor Mckeon MD. 
 
[facility] Monica Márquez is a 50 y.o. male. HPI The patient presents today for a follow-up. His TSH was 2.16 on 08/13 and he is compliant on levothyroxine 50 mcg. He notes that he is no longer experiencing the coldness and other sxs he had been experiencing. He tested positive for H. Pylori on 08/22. He notes that he was prescribed 3 antibiotics, but only took 2 because one was giving him nausea. He notes that he only finished 2 of the antibiotics because the third was making him feel nauseous and dizzy at work. He is currently taking Prilosec. He is compliant on Paxil and doing well. His appetite is good and he has been eating healthy. He weighs 136 lbs today. He occasionally takes calcium tablets. Patient Active Problem List  
Diagnosis Code  Acquired hypothyroidism E03.9  GERD (gastroesophageal reflux disease) K21.9  History of Helicobacter pylori infection Z86.19 Current Outpatient Prescriptions on File Prior to Visit Medication Sig Dispense Refill  PARoxetine (PAXIL) 10 mg tablet Take 1 Tab by mouth daily for 90 days. 90 Tab 0  
 cyanocobalamin (VITAMIN B-12) 1,000 mcg tablet Take 1,000 mcg by mouth daily.  multivitamin (ONE A DAY) tablet Take 1 Tab by mouth daily.  levothyroxine (SYNTHROID) 50 mcg tablet Take 1 Tab by mouth Daily (before breakfast) for 90 days. 30 Tab 2  
 omeprazole (PRILOSEC) 20 mg capsule Take 1 Cap by mouth daily.  levothyroxine (SYNTHROID) 50 mcg tablet Take 1 Tab by mouth Daily (before breakfast) for 90 days. 90 Tab 0  cholecalciferol (VITAMIN D3) 1,000 unit tablet Take 1 Tab by mouth two (2) times a day. 60 Tab 3  
 calcium carbonate (TUMS) 200 mg calcium (500 mg) chew Take 1 Tab by mouth as needed. No current facility-administered medications on file prior to visit. Past Medical History:  
Diagnosis Date  Acquired hypothyroidism 4/10/2017  GERD (gastroesophageal reflux disease)  Helicobacter pylori gastritis 03/2018 Past Surgical History:  
Procedure Laterality Date  HX COLONOSCOPY  03/2018 Dr. Aaron Monroe, repeat 10 years  HX ENDOSCOPY  03/2018 Dr. Aaron Monroe Family History Problem Relation Age of Onset  Thyroid Disease Mother  Elevated Lipids Mother  No Known Problems Father Social History Social History  Marital status:  Spouse name: N/A  
 Number of children: N/A  
 Years of education: N/A Occupational History  Not on file. Social History Main Topics  Smoking status: Never Smoker  Smokeless tobacco: Never Used  Alcohol use No  
 Drug use: No  
 Sexual activity: Yes  
  Partners: Female Birth control/ protection: Condom Other Topics Concern  Not on file Social History Narrative Orders Only on 08/22/2018 Component Date Value Ref Range Status  
 H. pylori Ag, stool, EIA 08/22/2018 Positive* Negative Final  
Office Visit on 08/13/2018 Component Date Value Ref Range Status  TSH 08/13/2018 2.160  0.450 - 4.500 uIU/mL Final  
 T4, Free 08/13/2018 1.72  0.82 - 1.77 ng/dL Final  
 
 
Review of Systems Respiratory: Negative for cough and shortness of breath. Gastrointestinal: Positive for heartburn. Negative for abdominal pain. Musculoskeletal: Negative for joint pain and myalgias. Neurological: Negative for dizziness, tingling, sensory change and headaches. Psychiatric/Behavioral: Negative for depression, memory loss and substance abuse. Visit Vitals  /64 (BP 1 Location: Right arm, BP Patient Position: Sitting)  Pulse 64  Temp 99.6 °F (37.6 °C) (Oral)  Resp 16  
 Ht 5' 9.02\" (1.753 m)  Wt 136 lb 3.2 oz (61.8 kg)  SpO2 98%  BMI 20.1 kg/m2 Physical Exam  
Constitutional: He is oriented to person, place, and time. He appears well-developed and well-nourished. HENT:  
Right Ear: External ear normal.  
Left Ear: External ear normal.  
Eyes: Conjunctivae and EOM are normal.  
Neck: Normal range of motion. Neck supple. Cardiovascular: Normal rate and regular rhythm. Pulmonary/Chest: Effort normal and breath sounds normal.  
Abdominal: Soft. Bowel sounds are normal. There is no tenderness. Neurological: He is alert and oriented to person, place, and time. Psychiatric: He has a normal mood and affect. His behavior is normal.  
Nursing note and vitals reviewed. ASSESSMENT and PLAN 
  ICD-10-CM ICD-9-CM 1. Helicobacter pylori infection A04.8 041.86 Bismuth Subcit P-Szczdiflk-JRP (PYLERA) 140-125-125 mg per capsule sent to pharmacy. Pylera prescribed. He should take 3 capsules 4 times daily for 10 days. He should take with breakfast, lunch, dinner, and before bed. Potential side effects were discussed. He should continue to take Prilosec. He should not take calcium tablets. If the H. Pylori does not go away, I will refer him to GI.  
2. Gastroesophageal reflux disease without esophagitis K21.9 530.81 omeprazole (PRILOSEC) 20 mg capsule sent to pharmacy. He should continue taking Prilosec while taking Pylera. This plan was reviewed with the patient and patient agrees. All questions were answered. This scribe documentation was reviewed by me and accurately reflects the examination and decisions made by me. This note will not be viewable in [address]

## 2018-08-31 NOTE — MR AVS SNAPSHOT
[address] [facility] [address] 
[phone] Patient: Angelique Berg MRN: [id number] KZM:[id number] Visit Information Date & Time Provider Department Dept. Phone Encounter #  
 8/31/2018  3:45 PM Devorah Andres MD [facility] [phone] [id number] Your Appointments 9/11/2018  9:00 AM  
New Patient with Kelsy Frausto MD  
[facility] [place]) Appt Note: Referred by Radha Reid NP, Cold extremities; Referred by Radha Reid NP, Cold extremities [facility] [place] [address]  
[address] [place] [id number] Upcoming Health Maintenance Date Due DTaP/Tdap/Td series (1 - Tdap) 9/1/1989 Influenza Age 5 to Adult 8/1/2018 Allergies as of 8/31/2018  Review Complete On: 8/31/2018 By: Devorah Andres MD  
 No Known Allergies Current Immunizations  Never Reviewed No immunizations on file. Not reviewed this visit You Were Diagnosed With   
  
 Codes Comments Helicobacter pylori infection    -  Primary ICD-10-CM: A04.8 ICD-9-CM: 041.86 Gastroesophageal reflux disease without esophagitis     ICD-10-CM: K21.9 ICD-9-CM: 530.81 Vitals BP Pulse Temp Resp Height(growth percentile) Weight(growth percentile) 108/64 (BP 1 Location: Right arm, BP Patient Position: Sitting) 64 99.6 °F (37.6 °C) (Oral) 16 5' 9.02\" (1.753 m) 136 lb 3.2 oz (61.8 kg) SpO2 BMI Smoking Status 98% 20.1 kg/m2 Never Smoker BMI and BSA Data Body Mass Index Body Surface Area  
 20.1 kg/m 2 1.73 m 2 Preferred Pharmacy Pharmacy Name Phone [id number] [place], [address] [address] [phone] Your Updated Medication List  
  
   
This list is accurate as of 8/31/18  4:43 PM.  Always use your most recent med list.  
  
 Bismuth Subcit R-Xrgpexsfa--125-125 mg per capsule Commonly known as:  PYLERA Take 3 Caps by mouth Before breakfast, lunch, dinner and at bedtime for 10 days. calcium carbonate 200 mg calcium (500 mg) Chew Commonly known as:  TUMS Take 1 Tab by mouth as needed. cholecalciferol 1,000 unit tablet Commonly known as:  VITAMIN D3 Take 1 Tab by mouth two (2) times a day. * levothyroxine 50 mcg tablet Commonly known as:  SYNTHROID Take 1 Tab by mouth Daily (before breakfast) for 90 days. * levothyroxine 50 mcg tablet Commonly known as:  SYNTHROID Take 1 Tab by mouth Daily (before breakfast) for 90 days. multivitamin tablet Commonly known as:  ONE A DAY Take 1 Tab by mouth daily. * omeprazole 20 mg capsule Commonly known as:  PRILOSEC Take 1 Cap by mouth daily. * omeprazole 20 mg capsule Commonly known as:  PRILOSEC Take 1 Cap by mouth daily for 30 days. PARoxetine 10 mg tablet Commonly known as:  PAXIL Take 1 Tab by mouth daily for 90 days. VITAMIN B-12 1,000 mcg tablet Generic drug:  cyanocobalamin Take 1,000 mcg by mouth daily. * Notice: This list has 4 medication(s) that are the same as other medications prescribed for you. Read the directions carefully, and ask your doctor or other care provider to review them with you. Prescriptions Sent to Pharmacy Refills Bismuth Subcit W-Panoywhxr-XHY (PYLERA) 140-125-125 mg per capsule 0 Sig: Take 3 Caps by mouth Before breakfast, lunch, dinner and at bedtime for 10 days. Class: Normal  
 Pharmacy: [id number] [place] [place] [address] Ph #: [phone] Route: Oral  
 omeprazole (PRILOSEC) 20 mg capsule 0 Sig: Take 1 Cap by mouth daily for 30 days. Class: Normal  
 Pharmacy: [id number]- [facility] [place] [address] Ph #: [phone] Route: Oral  
  
Introducing [facility]! Dear Josef Holland: Thank you for requesting a [employer] account. Our records indicate that you already have an active [employer] account. You can access your account anytime at [email or web address] Did you know that you can access your hospital and ER discharge instructions at any time in [employer]? You can also review all of your test results from your hospital stay or ER visit. Additional Information If you have questions, please visit the Frequently Asked Questions section of the [employer] website at [email or web address] Remember, [employer] is NOT to be used for urgent needs. For medical emergencies, dial 911. Now available from your iPhone and Android! Please provide this summary of care documentation to your next provider. Your primary care clinician is listed as Vimal Zheng. If you have any questions after today's visit, please call [phone].

## 2018-08-31 NOTE — PROGRESS NOTES
Chief Complaint Patient presents with  Thyroid Problem States f/u for thyroid and treatment for h-pylori Visit Vitals  /64 (BP 1 Location: Right arm, BP Patient Position: Sitting)  Pulse 64  Temp 99.6 °F (37.6 °C) (Oral)  Resp 16  
 Ht 5' 9.02\" (1.753 m)  Wt 136 lb 3.2 oz (61.8 kg)  SpO2 98%  BMI 20.1 kg/m2 1. Have you been to the ER, urgent care clinic since your last visit? Denies  Hospitalized since your last visit? Denies 2. Have you seen or consulted any other health care providers outside of the [address] [place] [address] since your last visit? Include any pap smears or colon screening. Denies

## 2018-09-02 DIAGNOSIS — E03.9 ACQUIRED HYPOTHYROIDISM: ICD-10-CM

## 2018-09-03 RX ORDER — LEVOTHYROXINE SODIUM 50 UG/1
TABLET ORAL
Qty: 90 TAB | Refills: 0 | Status: SHIPPED | OUTPATIENT
Start: 2018-09-03 | End: 2019-02-11 | Stop reason: SDUPTHER

## 2018-09-10 DIAGNOSIS — A04.8 HELICOBACTER PYLORI INFECTION: ICD-10-CM

## 2018-09-10 RX ORDER — BISMUTH SUBCITRATE POTASSIUM, METRONIDAZOLE, TETRACYCLINE HYDROCHLORIDE 140; 125; 125 MG/1; MG/1; MG/1
CAPSULE ORAL
Qty: 120 CAP | Refills: 0 | Status: SHIPPED | OUTPATIENT
Start: 2018-09-10 | End: 2020-03-23 | Stop reason: ALTCHOICE

## 2019-01-16 ENCOUNTER — OFFICE VISIT (OUTPATIENT)
Dept: PRIMARY CARE CLINIC | Age: 49
End: 2019-01-16

## 2019-01-16 VITALS
HEIGHT: 69 IN | WEIGHT: 134 LBS | SYSTOLIC BLOOD PRESSURE: 114 MMHG | DIASTOLIC BLOOD PRESSURE: 73 MMHG | RESPIRATION RATE: 17 BRPM | OXYGEN SATURATION: 100 % | BODY MASS INDEX: 19.85 KG/M2 | HEART RATE: 78 BPM | TEMPERATURE: 97.8 F

## 2019-01-16 DIAGNOSIS — R68.89 COLD INTOLERANCE OF HAND: ICD-10-CM

## 2019-01-16 DIAGNOSIS — R09.81 SINUS CONGESTION: Primary | ICD-10-CM

## 2019-01-16 DIAGNOSIS — F32.9 REACTIVE DEPRESSION: ICD-10-CM

## 2019-01-16 RX ORDER — FLUTICASONE PROPIONATE 50 MCG
SPRAY, SUSPENSION (ML) NASAL
Qty: 1 BOTTLE | Refills: 0 | Status: SHIPPED | OUTPATIENT
Start: 2019-01-16 | End: 2019-02-08 | Stop reason: ALTCHOICE

## 2019-01-16 RX ORDER — PAROXETINE 10 MG/1
10 TABLET, FILM COATED ORAL DAILY
Qty: 90 TAB | Refills: 0 | Status: SHIPPED | OUTPATIENT
Start: 2019-01-16 | End: 2019-04-16

## 2019-01-16 RX ORDER — LORATADINE 10 MG/1
10 TABLET ORAL DAILY
Qty: 30 TAB | Refills: 2 | Status: SHIPPED | OUTPATIENT
Start: 2019-01-16 | End: 2019-01-25 | Stop reason: SDUPTHER
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
## 2019-01-25 DIAGNOSIS — R09.81 SINUS CONGESTION: ICD-10-CM

## 2019-01-25 RX ORDER — LORATADINE 10 MG/1
10 TABLET ORAL DAILY
Qty: 30 TAB | Refills: 2 | Status: SHIPPED | OUTPATIENT
Start: 2019-01-25 | End: 2020-05-26

## 2019-01-25 NOTE — TELEPHONE ENCOUNTER
Requesting 90 day supply.     Last refill:1/16/19  Last lab:  Last Ov:1/16/19    Pharmacy:     [facility]

## 2019-02-08 ENCOUNTER — OFFICE VISIT (OUTPATIENT)
Dept: PRIMARY CARE CLINIC | Age: 49
End: 2019-02-08

## 2019-02-08 VITALS
BODY MASS INDEX: 19.73 KG/M2 | SYSTOLIC BLOOD PRESSURE: 108 MMHG | TEMPERATURE: 97.9 F | OXYGEN SATURATION: 100 % | HEART RATE: 83 BPM | DIASTOLIC BLOOD PRESSURE: 75 MMHG | RESPIRATION RATE: 16 BRPM | HEIGHT: 69 IN | WEIGHT: 133.2 LBS

## 2019-02-08 DIAGNOSIS — E03.9 ACQUIRED HYPOTHYROIDISM: Primary | ICD-10-CM

## 2019-02-08 DIAGNOSIS — R79.89 LOW VITAMIN D LEVEL: ICD-10-CM

## 2019-02-08 DIAGNOSIS — Z12.12 SCREENING FOR COLORECTAL CANCER: ICD-10-CM

## 2019-02-08 DIAGNOSIS — Z12.11 SCREENING FOR COLORECTAL CANCER: ICD-10-CM

## 2019-02-08 NOTE — PROGRESS NOTES
[facility]   Edilberto Foss [address], [address] [facility]  P: [phone]  F: [phone]      Chief Complaint   Patient presents with    Follow-up     follow up on thyroid. patient states that he ate a lot of pizza trying to gain some weight       Jorge Lone is a 46 y.o. male who presents to clinic for Follow-up (follow up on thyroid. patient states that he ate a lot of pizza trying to gain some weight). HPI:  The patient presents for f/u for hypothyroid and depression and anxiety. Hypothyroid: He is still taking Synthroid 50 mcg. He is having cold intolerance, especially to hands and toes. He requests labwork today as it has been a while. Prior TSH on 8/13 2.16 with normal free T4. Depression and Anxiety: He is stable after resuming Paxil. He is trying to gain weight as his appetite is down. He has anxiety related to his thyroid and cold \"spells\". He is walking daily which has helped. He completed two different [employer] counseling groups that met weekly for 1-2 months. HM: Colonoscopy q 5 years, had 1 last year.  He is due for a FIT test.     Patient Active Problem List    Diagnosis    History of Helicobacter pylori infection    GERD (gastroesophageal reflux disease)    Acquired hypothyroidism          Past Medical History:   Diagnosis Date    Acquired hypothyroidism 4/10/2017    GERD (gastroesophageal reflux disease)     Helicobacter pylori gastritis 03/2018     Past Surgical History:   Procedure Laterality Date    HX COLONOSCOPY  03/2018    Dr. Sigifredo Hastings, repeat 10 years    HX ENDOSCOPY  03/2018    Dr. Jacquie Israel History     Socioeconomic History    Marital status:      Spouse name: Not on file    Number of children: Not on file    Years of education: Not on file    Highest education level: Not on file   Social Needs    Financial resource strain: Not on file    Food insecurity - worry: Not on file    Food insecurity - inability: Not on file   [employer] needs - medical: Not on file   [employer] needs - non-medical: Not on file   Occupational History    Not on file   Tobacco Use    Smoking status: Never Smoker    Smokeless tobacco: Never Used   Substance and Sexual Activity    Alcohol use: No    Drug use: No    Sexual activity: Yes     Partners: Female     Birth control/protection: Condom   Other Topics Concern    Not on file   Social History Narrative    Not on file     Family History   Problem Relation Age of Onset    Thyroid Disease Mother     Elevated Lipids Mother     No Known Problems Father      No Known Allergies    Current Outpatient Medications   Medication Sig Dispense Refill    loratadine (CLARITIN) 10 mg tablet Take 1 Tab by mouth daily. 30 Tab 2    PARoxetine (PAXIL) 10 mg tablet Take 1 Tab by mouth daily for 90 days. 90 Tab 0    PYLERA 140-125-125 mg per capsule TAKE 3 CAPSULES BY MOUTH BEFORE BREAKFAST, LUNCH, DINNER AND AT BEDTIME FOR 10 DAYS. 120 Cap 0    levothyroxine (SYNTHROID) 50 mcg tablet TAKE 1 TABLET BY MOUTH DAILY BEFORE BREAKFAST 90 Tab 0    cyanocobalamin (VITAMIN B-12) 1,000 mcg tablet Take 1,000 mcg by mouth daily.  multivitamin (ONE A DAY) tablet Take 1 Tab by mouth daily.  cholecalciferol (VITAMIN D3) 1,000 unit tablet Take 1 Tab by mouth two (2) times a day. 60 Tab 3    calcium carbonate (TUMS) 200 mg calcium (500 mg) chew Take 1 Tab by mouth as needed. The medications were reviewed and updated in the medical record. The past medical history, past surgical history, and family history were reviewed and updated in the medical record. REVIEW OF SYSTEMS   Review of Systems   Constitutional: Negative for malaise/fatigue. HENT: Negative for congestion. Eyes: Negative for blurred vision and pain. Respiratory: Negative for cough and shortness of breath. Cardiovascular: Negative for chest pain and palpitations.    Gastrointestinal: Negative for abdominal pain and heartburn. Genitourinary: Negative for frequency and urgency. Musculoskeletal: Negative for joint pain and myalgias. Neurological: Negative for dizziness, tingling, sensory change, weakness and headaches. Psychiatric/Behavioral: Negative for depression, memory loss and substance abuse. PHYSICAL EXAM     Visit Vitals  /75 (BP 1 Location: Right arm, BP Patient Position: Sitting)   Pulse 83   Temp 97.9 °F (36.6 °C)   Resp 16   Ht 5' 9\" (1.753 m)   Wt 133 lb 3.2 oz (60.4 kg)   SpO2 100%   BMI 19.67 kg/m²       Physical Exam   Constitutional: He is oriented to person, place, and time and well-developed, well-nourished, and in no distress. HENT:   Head: Normocephalic and atraumatic. Right Ear: External ear normal.   Left Ear: External ear normal.   Cardiovascular: Normal rate, regular rhythm and normal heart sounds. Pulmonary/Chest: Effort normal and breath sounds normal.   Musculoskeletal: Normal range of motion. He exhibits no edema. Bilateral hands are cold. Radial pulses 2+ bilaterally. Neurological: He is alert and oriented to person, place, and time. Gait normal.   Skin: Skin is warm and dry. Psychiatric: Affect and judgment normal.   Nursing note and vitals reviewed. ASSESSMENT/ PLAN   Diagnoses and all orders for this visit:    1. Acquired hypothyroidism  -     TSH RFX ON ABNORMAL TO FREE T4  - Continues on 50mcg levothyroxine. Stable on last check 8/13. .     2. Screening for colorectal cancer  -     OCCULT BLOOD IMMUNOASSAY,DIAGNOSTIC    3. Low vitamin D level  -     VITAMIN D, 25 HYDROXY      Follow-up Disposition:  Return in about 3 months (around 5/8/2019) for Full Physical, Fasting Labs. Disclaimer:  Advised patient to call back or return to office if symptoms worsen/change/persist.  Discussed expected course/resolution/complications of diagnosis in detail with patient.     Medication risks/benefits/alternatives discussed with patient.   Patient was given an after visit summary which includes diagnoses, current medications, & vitals.      Discussed patient instructions and advised to read to all patient instructions regarding care.      Patient expressed understanding with the diagnosis and plan. This note will not be viewable in [address]         Alonzo Soto NP  2/8/2019        (This document has been electronically signed)

## 2019-02-08 NOTE — PROGRESS NOTES
Chief Complaint   Patient presents with    Follow-up     follow up on thyroid. patient states that he ate a lot of pizza trying to gain some weight     1. Have you been to an emergency room, urgent clinic, or hospitalized since your last visit? NO  If yes, where when, and reason for visit? 2. Have seen or consulted any other health care provider since your last visit? NO  Please include any pap smears or colon screening in this section  If yes, where when, and reason for visit? 3. Have you had any blood work or xray, including a mammogram since your last visit NO  If yes, where when, and reason for visit? 4. Are there any changes in medications including immunizations since your last visit? NO  If yes, where when, and reason for visit? 5. Would you like to speak with a health care team member about safety in your home? NO    6. Do you have an Advanced Directive/ Living Will in place?  NO  If yes, do we have a copy on file NO  If no, would you like information NO

## 2019-02-09 LAB
25(OH)D3+25(OH)D2 SERPL-MCNC: 28.1 NG/ML (ref 30–100)
TSH SERPL DL<=0.005 MIU/L-ACNC: 2.82 UIU/ML (ref 0.45–4.5)

## 2019-02-10 RX ORDER — ERGOCALCIFEROL 1.25 MG/1
50000 CAPSULE ORAL
Qty: 8 CAP | Refills: 0 | Status: SHIPPED | OUTPATIENT
Start: 2019-02-10 | End: 2020-11-15

## 2019-02-11 DIAGNOSIS — E03.9 ACQUIRED HYPOTHYROIDISM: ICD-10-CM

## 2019-02-11 RX ORDER — LEVOTHYROXINE SODIUM 50 UG/1
50 TABLET ORAL
Qty: 90 TAB | Refills: 0 | Status: SHIPPED | OUTPATIENT
Start: 2019-02-11 | End: 2019-05-10 | Stop reason: SDUPTHER

## 2019-02-22 LAB — HEMOCCULT STL QL IA: NEGATIVE

## 2019-02-22 NOTE — PROGRESS NOTES
Manuel Dee,     Your FIT test was normal. No follow-up necessary.      [address] [employer]

## 2019-03-15 ENCOUNTER — TELEPHONE (OUTPATIENT)
Dept: PRIMARY CARE CLINIC | Age: 49
End: 2019-03-15

## 2019-03-15 ENCOUNTER — OFFICE VISIT (OUTPATIENT)
Dept: PRIMARY CARE CLINIC | Age: 49
End: 2019-03-15

## 2019-03-15 VITALS
TEMPERATURE: 98.4 F | OXYGEN SATURATION: 99 % | HEIGHT: 69 IN | HEART RATE: 96 BPM | BODY MASS INDEX: 19.91 KG/M2 | RESPIRATION RATE: 15 BRPM | SYSTOLIC BLOOD PRESSURE: 103 MMHG | WEIGHT: 134.4 LBS | DIASTOLIC BLOOD PRESSURE: 69 MMHG

## 2019-03-15 DIAGNOSIS — N50.82 SCROTAL PAIN: ICD-10-CM

## 2019-03-15 DIAGNOSIS — K92.1 BLOOD IN STOOL: ICD-10-CM

## 2019-03-15 DIAGNOSIS — E03.9 ACQUIRED HYPOTHYROIDISM: ICD-10-CM

## 2019-03-15 DIAGNOSIS — R50.9 FEVER AND CHILLS: Primary | ICD-10-CM

## 2019-03-15 DIAGNOSIS — E55.9 VITAMIN D DEFICIENCY: ICD-10-CM

## 2019-03-15 DIAGNOSIS — R73.03 PRE-DIABETES: ICD-10-CM

## 2019-03-15 DIAGNOSIS — E78.5 DYSLIPIDEMIA: ICD-10-CM

## 2019-03-15 RX ORDER — MELATONIN
1000 2 TIMES DAILY
Qty: 60 TAB | Refills: 3 | Status: CANCELLED | OUTPATIENT
Start: 2019-03-15

## 2019-03-15 NOTE — PROGRESS NOTES
Written by Kassi Adams, as dictated by Dr. Kelly Malin MD.    Omar Tamayo is a 46 y.o. male. HPI  The patient presents today c/o fever. He reports that his mother was very sick and contagious when he saw her. His mother had sore throat and chills. She was seen at [facility] and told that she had a virus which was contagious. Yesterday he started sneezing and felt warm. Last night he believes his fever went up. Pt had a little sweating. He does not feel as warm today as he did yesterday, however he notes that sometimes his face feels warm. Denies myalgias. The patient took OTC Tylenol. Patient is also c/o blood in stool. Last night he had a bowel movement prior to going to bed and saw blood in his stool. Sometimes he has some RLQ abdominal discomfort which improves after laying down. Denies constipation and pain with bowel movements. FIT was negative on 02/22/2019. Last endoscopy and colonoscopy were in 01/2018. Compliant on Synthroid 50 mcg and Paxil 10 mg. Patient Active Problem List   Diagnosis Code    Acquired hypothyroidism E03.9    GERD (gastroesophageal reflux disease) K21.9    History of Helicobacter pylori infection Z86.19        Current Outpatient Medications on File Prior to Visit   Medication Sig Dispense Refill    levothyroxine (SYNTHROID) 50 mcg tablet Take 1 Tab by mouth Daily (before breakfast) for 90 days. 90 Tab 0    ergocalciferol (ERGOCALCIFEROL) 50,000 unit capsule Take 1 Cap by mouth every seven (7) days. 8 Cap 0    loratadine (CLARITIN) 10 mg tablet Take 1 Tab by mouth daily. 30 Tab 2    PARoxetine (PAXIL) 10 mg tablet Take 1 Tab by mouth daily for 90 days. 90 Tab 0    PYLERA 140-125-125 mg per capsule TAKE 3 CAPSULES BY MOUTH BEFORE BREAKFAST, LUNCH, DINNER AND AT BEDTIME FOR 10 DAYS. 120 Cap 0    cyanocobalamin (VITAMIN B-12) 1,000 mcg tablet Take 1,000 mcg by mouth daily.       cholecalciferol (VITAMIN D3) 1,000 unit tablet Take 1 Tab by mouth two (2) times a day. 60 Tab 3    calcium carbonate (TUMS) 200 mg calcium (500 mg) chew Take 1 Tab by mouth as needed.  multivitamin (ONE A DAY) tablet Take 1 Tab by mouth daily. No current facility-administered medications on file prior to visit. No Known Allergies    Past Medical History:   Diagnosis Date    Acquired hypothyroidism 4/10/2017    GERD (gastroesophageal reflux disease)     Helicobacter pylori gastritis 03/2018       Past Surgical History:   Procedure Laterality Date    HX COLONOSCOPY  03/2018    Dr. Sigifredo Hastings, repeat 10 years    HX ENDOSCOPY  03/2018    Dr. Sigifredo Hastings       Family History   Problem Relation Age of Onset    Thyroid Disease Mother     Elevated Lipids Mother     No Known Problems Father        Social History     Socioeconomic History    Marital status:      Spouse name: Not on file    Number of children: Not on file    Years of education: Not on file    Highest education level: Not on file   Social Needs    Financial resource strain: Not on file    Food insecurity - worry: Not on file    Food insecurity - inability: Not on file   [employer] needs - medical: Not on file   [employer] needs - non-medical: Not on file   Occupational History    Not on file   Tobacco Use    Smoking status: Never Smoker    Smokeless tobacco: Never Used   Substance and Sexual Activity    Alcohol use: No    Drug use: No    Sexual activity: Yes     Partners: Female     Birth control/protection: Condom   Other Topics Concern    Not on file   Social History Narrative    Not on file       Office Visit on 02/08/2019   Component Date Value Ref Range Status    TSH 02/08/2019 2.820  0.450 - 4.500 uIU/mL Final    Occult blood fecal, by IA 02/08/2019 Negative  Negative Final    VITAMIN D, 25-HYDROXY 02/08/2019 28.1* 30.0 - 100.0 ng/mL Final       Review of Systems   Constitutional: Positive for chills, diaphoresis and fever (subjective). Negative for malaise/fatigue. HENT:        +sneezing   Respiratory: Negative for cough and shortness of breath. Gastrointestinal: Positive for abdominal pain (RLQ) and blood in stool. Negative for heartburn. Musculoskeletal: Negative for joint pain and myalgias. Neurological: Negative for dizziness, tingling, sensory change, weakness and headaches. Psychiatric/Behavioral: Negative for depression, memory loss and substance abuse. Visit Vitals  /69 (BP 1 Location: Left arm, BP Patient Position: Sitting)   Pulse 96   Temp 98.4 °F (36.9 °C) (Oral)   Resp 15   Ht 5' 9\" (1.753 m)   Wt 134 lb 6.4 oz (61 kg)   SpO2 99%   BMI 19.85 kg/m²       Physical Exam   Constitutional: He is oriented to person, place, and time. He appears well-developed and well-nourished. No distress. HENT:   Right Ear: External ear normal.   Left Ear: External ear normal.   Eyes: Conjunctivae and EOM are normal. Right eye exhibits no discharge. Left eye exhibits no discharge. Neck: Normal range of motion. Neck supple. Cardiovascular: Normal rate and regular rhythm. Pulmonary/Chest: Effort normal and breath sounds normal. He has no wheezes. Abdominal: Soft. Bowel sounds are normal. He exhibits no distension. There is no tenderness. Lymphadenopathy:     He has no cervical adenopathy. Neurological: He is alert and oriented to person, place, and time. Skin: He is not diaphoretic. Psychiatric: He has a normal mood and affect. His behavior is normal.   Nursing note and vitals reviewed. ASSESSMENT and PLAN    ICD-10-CM ICD-9-CM    1. Fever and chills R50.9 780.60 Discussed that his sxs may be due to colitis. 2. Blood in stool K92.1 578.1 REFERRAL TO GASTROENTEROLOGY     Referred to GI for possible colonoscopy. Discussed that I will not order a FIT as his last FIT was negative on 02/08//2019. This plan was reviewed with the patient and patient agrees. All questions were answered.     This scribe documentation was reviewed by me and accurately reflects the examination and decisions made by me. This note will not be viewable in [address]

## 2019-03-15 NOTE — TELEPHONE ENCOUNTER
Requesting 90 day supply.     Last refill:9/20/16  Last lab:2/8/19  Last Ov:2/8/19    Pharmacy:     [facility]

## 2019-03-15 NOTE — TELEPHONE ENCOUNTER
Patient is running fever and had some blood in the stool last night advised to come in and appointment made for 11:20

## 2019-05-10 DIAGNOSIS — E03.9 ACQUIRED HYPOTHYROIDISM: ICD-10-CM

## 2019-05-10 RX ORDER — LEVOTHYROXINE SODIUM 50 UG/1
TABLET ORAL
Qty: 90 TAB | Refills: 0 | Status: SHIPPED | OUTPATIENT
Start: 2019-05-10 | End: 2019-08-16 | Stop reason: SDUPTHER

## 2019-08-16 DIAGNOSIS — E03.9 ACQUIRED HYPOTHYROIDISM: ICD-10-CM

## 2019-08-16 RX ORDER — LEVOTHYROXINE SODIUM 50 UG/1
TABLET ORAL
Qty: 90 TAB | Refills: 0 | Status: SHIPPED | OUTPATIENT
Start: 2019-08-16 | End: 2019-11-10 | Stop reason: SDUPTHER

## 2019-09-06 ENCOUNTER — HOSPITAL ENCOUNTER (OUTPATIENT)
Dept: GENERAL RADIOLOGY | Age: 49
Discharge: HOME | End: 2019-09-06
Payer: COMMERCIAL

## 2019-09-06 ENCOUNTER — OFFICE VISIT (OUTPATIENT)
Dept: PRIMARY CARE CLINIC | Age: 49
End: 2019-09-06

## 2019-09-06 VITALS
BODY MASS INDEX: 19.85 KG/M2 | DIASTOLIC BLOOD PRESSURE: 63 MMHG | HEIGHT: 69 IN | TEMPERATURE: 98 F | SYSTOLIC BLOOD PRESSURE: 110 MMHG | WEIGHT: 134 LBS | OXYGEN SATURATION: 97 % | RESPIRATION RATE: 14 BRPM | HEART RATE: 86 BPM

## 2019-09-06 DIAGNOSIS — I73.00 RAYNAUD'S DISEASE WITHOUT GANGRENE: ICD-10-CM

## 2019-09-06 DIAGNOSIS — G89.29 CHRONIC MIDLINE LOW BACK PAIN WITHOUT SCIATICA: ICD-10-CM

## 2019-09-06 DIAGNOSIS — M54.50 CHRONIC MIDLINE LOW BACK PAIN WITHOUT SCIATICA: ICD-10-CM

## 2019-09-06 DIAGNOSIS — E03.9 ACQUIRED HYPOTHYROIDISM: ICD-10-CM

## 2019-09-06 DIAGNOSIS — R20.9 COLD HANDS AND FEET: Primary | ICD-10-CM

## 2019-09-06 DIAGNOSIS — R20.2 TINGLING: ICD-10-CM

## 2019-09-06 PROCEDURE — 72100 X-RAY EXAM L-S SPINE 2/3 VWS: CPT

## 2019-09-06 NOTE — PROGRESS NOTES
Written by Loree Gonzales, as dictated by Dr. Guido Walker MD.    Steve Walker is a 44 y.o. male. HPI  The patient presents today for a follow-up on coldness and numbness of his BL hands and feet. He reports that they have not improved as he is still shivering. He is also having  tingling in his hands and feet. He has been trying to keep a log/journal of his symptoms to see if he can determine the inciting factors. He notes that his feet would get very cold and will try to cover them up with blankets. He  cannot wear open-toed shoes without feeling very cold in his feet, and has been proactively wearing thick socks and closed-toed shoes. Denies cough and sickness. He denies changes in color of his hands when in the cold. Even when he wears socks, his feet are still cold. He notes that he has lower back issues, and is not sure it is back related. He has been going to a chiropractor to help with this lower back pain. He notes that he will have some discomfort in his neck. He had an MRI in th past, and was told that he had arthritis. TSH was last checked in 02/2019, and was normal. Denies smoking, drinking, and drug use. He went to GI, and was told that he had hemorrhoid issues. He was given the cream, and reports everything was fine. He asked the GI if his cold hands and shivering symptoms were related to something in his GI, but was told that it was likely unrelated. He does not receive flu shots, as he reports that he has heard stories of people getting sick after getting the flu shots.  He denies issues with the flu in the past.    Patient Active Problem List   Diagnosis Code    Acquired hypothyroidism E03.9    GERD (gastroesophageal reflux disease) K21.9    History of Helicobacter pylori infection Z86.19        Current Outpatient Medications on File Prior to Visit   Medication Sig Dispense Refill    levothyroxine (SYNTHROID) 50 mcg tablet TAKE 1 TAB BY MOUTH DAILY BEFORE BREAKFAST 90 Tab 0    cyanocobalamin (VITAMIN B-12) 1,000 mcg tablet Take 1,000 mcg by mouth daily.  multivitamin (ONE A DAY) tablet Take 1 Tab by mouth daily.  cholecalciferol (VITAMIN D3) 1,000 unit tablet Take 1 Tab by mouth two (2) times a day. 60 Tab 3    calcium carbonate (TUMS) 200 mg calcium (500 mg) chew Take 1 Tab by mouth as needed.  ergocalciferol (ERGOCALCIFEROL) 50,000 unit capsule Take 1 Cap by mouth every seven (7) days. 8 Cap 0    loratadine (CLARITIN) 10 mg tablet Take 1 Tab by mouth daily. 30 Tab 2    PYLERA 140-125-125 mg per capsule TAKE 3 CAPSULES BY MOUTH BEFORE BREAKFAST, LUNCH, DINNER AND AT BEDTIME FOR 10 DAYS. 120 Cap 0     No current facility-administered medications on file prior to visit.         Past Medical History:   Diagnosis Date    Acquired hypothyroidism 4/10/2017    GERD (gastroesophageal reflux disease)     Helicobacter pylori gastritis 03/2018       Past Surgical History:   Procedure Laterality Date    HX COLONOSCOPY  03/2018    Dr. Susanna Arvizu, repeat 10 years    HX ENDOSCOPY  03/2018    Dr. Susanna Arvizu       Family History   Problem Relation Age of Onset    Thyroid Disease Mother     Elevated Lipids Mother     No Known Problems Father        Social History     Socioeconomic History    Marital status:      Spouse name: Not on file    Number of children: Not on file    Years of education: Not on file    Highest education level: Not on file   Occupational History    Not on file   Social Needs    Financial resource strain: Not on file    Food insecurity:     Worry: Not on file     Inability: Not on file    Transportation needs:     Medical: Not on file     Non-medical: Not on file   Tobacco Use    Smoking status: Never Smoker    Smokeless tobacco: Never Used   Substance and Sexual Activity    Alcohol use: No    Drug use: No    Sexual activity: Yes     Partners: Female     Birth control/protection: Condom   Lifestyle    Physical activity:     Days per week: Not on file     Minutes per session: Not on file    Stress: Not on file   Relationships    Social connections:     Talks on phone: Not on file     Gets together: Not on file     Attends [employer] service: Not on file     Active member of club or organization: Not on file     Attends meetings of clubs or organizations: Not on file     Relationship status: Not on file    Intimate partner violence:     Fear of current or ex partner: Not on file     Emotionally abused: Not on file     Physically abused: Not on file     Forced sexual activity: Not on file   Other Topics Concern    Not on file   Social History Narrative    Not on file       Review of Systems   Constitutional: Negative for malaise/fatigue. HENT: Negative for congestion. Eyes: Negative for blurred vision. Respiratory: Negative for shortness of breath. Cardiovascular: Negative for chest pain and leg swelling. Gastrointestinal: Negative for constipation, diarrhea and heartburn. Genitourinary: Negative for dysuria, frequency and urgency. Musculoskeletal: Positive for back pain (lower back). Negative for joint pain and myalgias. Neurological: Positive for tingling and sensory change (cold-sensation BL extremities). Negative for dizziness and headaches. Psychiatric/Behavioral: Negative for depression and substance abuse. The patient is not nervous/anxious and does not have insomnia. Visit Vitals  /63 (BP 1 Location: Left arm, BP Patient Position: Sitting)   Pulse 86   Temp 98 °F (36.7 °C) (Oral)   Resp 14   Ht 5' 9\" (1.753 m)   Wt 134 lb (60.8 kg)   SpO2 97%   BMI 19.79 kg/m²       Physical Exam   Constitutional: He is oriented to person, place, and time. He appears well-developed and well-nourished. No distress. HENT:   Head: Normocephalic and atraumatic. Right Ear: External ear normal.   Left Ear: External ear normal.   Eyes: Conjunctivae are normal. Right eye exhibits no discharge.  Left eye exhibits no discharge. Neck: Normal range of motion. Neck supple. Cardiovascular: Normal rate, regular rhythm and normal heart sounds. Exam reveals no gallop and no friction rub. No murmur heard. Pulmonary/Chest: Effort normal and breath sounds normal. He has no wheezes. Abdominal: Soft. Bowel sounds are normal. There is no tenderness. Musculoskeletal: Normal range of motion. Neurological: He is alert and oriented to person, place, and time. He has normal reflexes. Skin: He is not diaphoretic. Psychiatric: He has a normal mood and affect. His behavior is normal. Thought content normal.   Nursing note and vitals reviewed. ASSESSMENT and PLAN    ICD-10-CM ICD-9-CM    1. Cold hands and feet R20.9 782.9 Instructions were given to patient given to look-up Raynoud's Symptoms as some patients will have issues with color changes in their extremities with cold weather. Advised pt to make an appointment with Rheumatology to further address Raynaud's disease. 2. Tingling R20.2 782.0 Advised pt that tingling is most likely related to his cold hands. Pt should look up Raynaud's disease for further explanations. 3. Acquired hypothyroidism E03.9 244.9 TSH RFX ON ABNORMAL TO FREE T4    TSH RF on Abnormal ordered. 4. Raynaud's disease without gangrene I73.00 443.0 REFERRAL TO RHEUMATOLOGY    Referred to Rheumatology. 5. Chronic midline low back pain without sciatica M54.5 724.2 XR SPINE LUMB 2 OR 3 V    G89.29 338.29 REFERRAL TO RHEUMATOLOGY    Referred to Rheumatology. XR Spine Lumbar ordered. Advised patient that any indications of arthritis or inflammation of spine should be addressed by rheumatology. This plan was reviewed with the patient and patient agrees. All questions were answered. This scribe documentation was reviewed by me and accurately reflects the examination and decisions made by me. This note will not be viewable in [address]

## 2019-09-06 NOTE — PROGRESS NOTES
Visit Vitals  /63 (BP 1 Location: Left arm, BP Patient Position: Sitting)   Pulse 86   Temp 98 °F (36.7 °C) (Oral)   Resp 14   Ht 5' 9\" (1.753 m)   Wt 134 lb (60.8 kg)   SpO2 97%   BMI 19.79 kg/m²             Chief Complaint   Patient presents with    Follow-up     Coldness and Numbness of the Extremeties; Patient states no Improvement            HM Due Reviewed and WNL. 1. Have you been to the ER, urgent care clinic since your last visit? Hospitalized since your last visit? Denies     2. Have you seen or consulted any other health care providers outside of the [address] [place] [address] since your last visit? Include any pap smears or colon screening.  Denies

## 2019-09-07 LAB — TSH SERPL DL<=0.005 MIU/L-ACNC: 3 UIU/ML (ref 0.45–4.5)

## 2019-09-09 NOTE — PROGRESS NOTES
Iliana Calvillo, your thyroid number is okay but the way you are having symptoms I would suggest changing the dose. Try 2 tablets of synthroid on Sunday & 1 tablet rest of the week. Will repeat levels in 8 weeks. Let me know how you feel?

## 2019-09-09 NOTE — PROGRESS NOTES
Sindi Aragon, your back X-ray came back fine. I would recommend going to physical therapist for 4-6 weeks.

## 2019-11-10 DIAGNOSIS — E03.9 ACQUIRED HYPOTHYROIDISM: ICD-10-CM

## 2019-11-10 RX ORDER — LEVOTHYROXINE SODIUM 50 UG/1
TABLET ORAL
Qty: 90 TAB | Refills: 0 | Status: SHIPPED | OUTPATIENT
Start: 2019-11-10 | End: 2020-02-07

## 2020-02-07 DIAGNOSIS — E03.9 ACQUIRED HYPOTHYROIDISM: ICD-10-CM

## 2020-02-07 RX ORDER — LEVOTHYROXINE SODIUM 50 UG/1
TABLET ORAL
Qty: 90 TAB | Refills: 0 | Status: SHIPPED | OUTPATIENT
Start: 2020-02-07 | End: 2020-04-30 | Stop reason: SDUPTHER

## 2020-03-06 NOTE — TELEPHONE ENCOUNTER
----- Message from Harish Xiong sent at 2/14/2018  4:57 PM EST -----  Regarding: QIANA Mccray/ Telephone  Contact: [phone]  Pt. Requesting an order for a MRI. Pt. symptoms are not improving.
[phone] attempted to call patient no answer left message to call me back in regards to his request for MRI
[phone] (home)  notified of Romaine Espinoza recommendation     Patient has not completed physical therapy and X-ray was normal. MRI will not be approved by insurance.  We can refer him to orthopedics if needed.     Patient understand
[phone] attempted to call patient no answer left message to call me back in regards to his message
Patient has not completed physical therapy and X-ray was normal. MRI will not be approved by insurance. We can refer him to orthopedics if needed.
Patient is returning a call back from nurse. Tried calling nurse, no success. Patient would like a call back.      Pts number: [phone]  2/15/18
Patient is returning call. Please call back.   [phone]
primary

## 2020-03-23 ENCOUNTER — OFFICE VISIT (OUTPATIENT)
Dept: PRIMARY CARE CLINIC | Age: 50
End: 2020-03-23

## 2020-03-23 VITALS
HEIGHT: 69 IN | WEIGHT: 143 LBS | SYSTOLIC BLOOD PRESSURE: 116 MMHG | RESPIRATION RATE: 16 BRPM | HEART RATE: 75 BPM | OXYGEN SATURATION: 100 % | DIASTOLIC BLOOD PRESSURE: 78 MMHG | TEMPERATURE: 97.9 F | BODY MASS INDEX: 21.18 KG/M2

## 2020-03-23 DIAGNOSIS — E03.9 ACQUIRED HYPOTHYROIDISM: ICD-10-CM

## 2020-03-23 DIAGNOSIS — Z12.11 COLON CANCER SCREENING: ICD-10-CM

## 2020-03-23 DIAGNOSIS — Z00.00 PHYSICAL EXAM: Primary | ICD-10-CM

## 2020-03-23 NOTE — PROGRESS NOTES
Chief Complaint   Patient presents with    Complete Physical     had tea and bread with peanut butter this am.  is working from home and trying to distance self,  has pain in right jaw

## 2020-03-23 NOTE — PROGRESS NOTES
Written by Denice Pradhan, as dictated by Dr. Harrison Woods MD.    Cindy Peñaloza is a 44 y.o. male. HPI  The patient presents today for complete physical exam. The patient is not fasting for labs. Compliant on Synthroid 50 mcg daily. He denies any seasonal allergy symptoms and has not needed to take Claritin recently. Denies chest tightness, shortness of breath, constipation, and urinary changes. He also denies any exposure to sick contacts or recent travel. He reports intermittent pain in his right jaw. He believes that it may be a strained muscle. He asked his dentist about his pain, who said that his jaw appeared normal. However, he notes that he grinds his teeth at night. He has a  at home, and he has been wearing it despite it being uncomfortable. He is scheduled to start PT for lower back and neck pain soon. He states that his back pain is improved with exercises and stretching. He has switched from going to a chiropractor to a spinal specialist for his symptoms. He has been taking Tums as needed and notes that his heartburn is well-controlled. He was on Omeprazole in the past, but has not needed this medication since his stress has improved. He states that he is still experiencing intermittent cold sensations in his hands, noting that his right hand is worse than his left. He feels like his symptoms are improving. His last EGD and colonoscopy were done on 03/20/2018. He was told that everything was normal, except for 1-2 polyps, which were removed. He also received antibiotics for an H. pylori infection at that time. He does not remember when he last received the Tdap vaccine, stating that it was likely over 5 years ago, and it was done at [facility]. He never gets flu vaccines.      His last prostate exam was done with Dr. Flora Roa (Urology) earlier this month, and his PSA was normal.       Patient Active Problem List Diagnosis Code    Acquired hypothyroidism E03.9    GERD (gastroesophageal reflux disease) K21.9    History of Helicobacter pylori infection Z86.19        Current Outpatient Medications on File Prior to Visit   Medication Sig Dispense Refill    levothyroxine (SYNTHROID) 50 mcg tablet TAKE 1 TAB BY MOUTH DAILY BEFORE BREAKFAST 90 Tab 0    ergocalciferol (ERGOCALCIFEROL) 50,000 unit capsule Take 1 Cap by mouth every seven (7) days. 8 Cap 0    loratadine (CLARITIN) 10 mg tablet Take 1 Tab by mouth daily. 30 Tab 2    cyanocobalamin (VITAMIN B-12) 1,000 mcg tablet Take 1,000 mcg by mouth daily.  multivitamin (ONE A DAY) tablet Take 1 Tab by mouth daily.  cholecalciferol (VITAMIN D3) 1,000 unit tablet Take 1 Tab by mouth two (2) times a day. 60 Tab 3    calcium carbonate (TUMS) 200 mg calcium (500 mg) chew Take 1 Tab by mouth as needed.  [DISCONTINUED] PYLERA 140-125-125 mg per capsule TAKE 3 CAPSULES BY MOUTH BEFORE BREAKFAST, LUNCH, DINNER AND AT BEDTIME FOR 10 DAYS. 120 Cap 0     No current facility-administered medications on file prior to visit.         No Known Allergies    Past Medical History:   Diagnosis Date    Acquired hypothyroidism 4/10/2017    GERD (gastroesophageal reflux disease)     Helicobacter pylori gastritis 03/2018       Past Surgical History:   Procedure Laterality Date    HX COLONOSCOPY  03/2018    Dr. Judson Colin, repeat 10 years    HX ENDOSCOPY  03/2018    Dr. Judson Colin       Family History   Problem Relation Age of Onset    Thyroid Disease Mother     Elevated Lipids Mother     No Known Problems Father        Social History     Socioeconomic History    Marital status:      Spouse name: Not on file    Number of children: Not on file    Years of education: Not on file    Highest education level: Not on file   Occupational History    Not on file   Social Needs    Financial resource strain: Not on file    Food insecurity     Worry: Not on file     Inability: Not on file   [employer] needs     Medical: Not on file     Non-medical: Not on file   Tobacco Use    Smoking status: Never Smoker    Smokeless tobacco: Never Used   Substance and Sexual Activity    Alcohol use: No    Drug use: No    Sexual activity: Yes     Partners: Female     Birth control/protection: Condom   Lifestyle    Physical activity     Days per week: Not on file     Minutes per session: Not on file    Stress: Not on file   Relationships    Social connections     Talks on phone: Not on file     Gets together: Not on file     Attends Protestant service: Not on file     Active member of club or organization: Not on file     Attends meetings of clubs or organizations: Not on file     Relationship status: Not on file    Intimate partner violence     Fear of current or ex partner: Not on file     Emotionally abused: Not on file     Physically abused: Not on file     Forced sexual activity: Not on file   Other Topics Concern    Not on file   Social History Narrative    Not on file       Review of Systems   Constitutional: Negative for malaise/fatigue and weight loss. HENT: Negative for congestion and hearing loss. Positive for right jaw pain. Eyes: Negative for blurred vision and photophobia. Respiratory: Negative for cough and shortness of breath. Cardiovascular: Negative for chest pain and leg swelling. Gastrointestinal: Positive for heartburn (well-controlled with Tums PRN). Negative for constipation and diarrhea. Genitourinary: Negative for dysuria, frequency and urgency. Musculoskeletal: Positive for back pain and neck pain. Negative for joint pain and myalgias. Neurological: Negative for dizziness and headaches. Psychiatric/Behavioral: Negative for depression and substance abuse. The patient is not nervous/anxious and does not have insomnia.       Visit Vitals  /78 (BP 1 Location: Left arm, BP Patient Position: Sitting)   Pulse 75   Temp 97.9 °F (36.6 °C) (Oral)   Resp 16   Ht 5' 9\" (1.753 m)   Wt 143 lb (64.9 kg)   SpO2 100%   BMI 21.12 kg/m²       Physical Exam  Vitals signs and nursing note reviewed. Constitutional:       General: He is not in acute distress. Appearance: Normal appearance. He is well-developed and well-groomed. He is not diaphoretic. HENT:      Head: Normocephalic and atraumatic. Right Ear: Tympanic membrane, ear canal and external ear normal.      Left Ear: Tympanic membrane, ear canal and external ear normal.      Nose:      Right Sinus: No maxillary sinus tenderness. Left Sinus: No maxillary sinus tenderness. Mouth/Throat:      Mouth: Mucous membranes are moist.      Pharynx: Oropharynx is clear. Eyes:      General:         Right eye: No discharge. Left eye: No discharge. Conjunctiva/sclera: Conjunctivae normal.      Pupils: Pupils are equal, round, and reactive to light. Neck:      Musculoskeletal: Normal range of motion and neck supple. Thyroid: No thyromegaly. Cardiovascular:      Rate and Rhythm: Normal rate and regular rhythm. Pulses: Normal pulses. Dorsalis pedis pulses are 2+ on the right side and 2+ on the left side. Heart sounds: Normal heart sounds. No murmur. No friction rub. No gallop. Pulmonary:      Effort: Pulmonary effort is normal.      Breath sounds: Normal breath sounds. No wheezing. Abdominal:      General: Bowel sounds are normal. There is no distension. Palpations: Abdomen is soft. Tenderness: There is no abdominal tenderness. Musculoskeletal: Normal range of motion. Comments: + BL knees without crepitus   Lymphadenopathy:      Cervical: No cervical adenopathy. Neurological:      Mental Status: He is alert and oriented to person, place, and time. Deep Tendon Reflexes: Reflexes are normal and symmetric. Reflex Scores:       Patellar reflexes are 2+ on the right side and 2+ on the left side.   Psychiatric:         Behavior: Behavior normal.         Thought Content: Thought content normal.        ASSESSMENT and PLAN    ICD-10-CM ICD-9-CM    1. Physical exam Z00.00 V70.9 CBC W/O DIFF      METABOLIC PANEL, COMPREHENSIVE      LIPID PANEL    Complete physical exam done. Pt will return during lab hours in 2 days to have basic fasting labs drawn. 2. Acquired hypothyroidism E03.9 244.9 TSH 3RD GENERATION    Compliant on Synthroid 50 mcg daily. TSH ordered. 3. Colon cancer screening Z12.11 V76.51 OCCULT BLOOD IMMUNOASSAY,DIAGNOSTIC    Occult Blood Immunoassay ordered. This plan was reviewed with the patient and patient agrees. All questions were answered. This scribe documentation was reviewed by me and accurately reflects the examination and decisions made by me. This note will not be viewable in [address]

## 2020-03-26 LAB
ALBUMIN SERPL-MCNC: 4.9 G/DL (ref 3.8–4.9)
ALBUMIN/GLOB SERPL: 2.6 {RATIO} (ref 1.2–2.2)
ALP SERPL-CCNC: 85 IU/L (ref 39–117)
ALT SERPL-CCNC: 33 IU/L (ref 0–44)
AST SERPL-CCNC: 28 IU/L (ref 0–40)
BILIRUB SERPL-MCNC: 0.7 MG/DL (ref 0–1.2)
BUN SERPL-MCNC: 18 MG/DL (ref 6–24)
BUN/CREAT SERPL: 19 (ref 9–20)
CALCIUM SERPL-MCNC: 9.8 MG/DL (ref 8.7–10.2)
CHLORIDE SERPL-SCNC: 106 MMOL/L (ref 96–106)
CHOLEST SERPL-MCNC: 209 MG/DL (ref 100–199)
CO2 SERPL-SCNC: 26 MMOL/L (ref 20–29)
CREAT SERPL-MCNC: 0.93 MG/DL (ref 0.76–1.27)
ERYTHROCYTE [DISTWIDTH] IN BLOOD BY AUTOMATED COUNT: 11.8 % (ref 11.6–15.4)
GLOBULIN SER CALC-MCNC: 1.9 G/DL (ref 1.5–4.5)
GLUCOSE SERPL-MCNC: 99 MG/DL (ref 65–99)
HCT VFR BLD AUTO: 47 % (ref 37.5–51)
HDLC SERPL-MCNC: 68 MG/DL
HGB BLD-MCNC: 15.2 G/DL (ref 13–17.7)
LDLC SERPL CALC-MCNC: 126 MG/DL (ref 0–99)
MCH RBC QN AUTO: 27.4 PG (ref 26.6–33)
MCHC RBC AUTO-ENTMCNC: 32.3 G/DL (ref 31.5–35.7)
MCV RBC AUTO: 85 FL (ref 79–97)
PLATELET # BLD AUTO: 281 X10E3/UL (ref 150–450)
POTASSIUM SERPL-SCNC: 4.6 MMOL/L (ref 3.5–5.2)
PROT SERPL-MCNC: 6.8 G/DL (ref 6–8.5)
RBC # BLD AUTO: 5.54 X10E6/UL (ref 4.14–5.8)
SODIUM SERPL-SCNC: 147 MMOL/L (ref 134–144)
TRIGL SERPL-MCNC: 74 MG/DL (ref 0–149)
TSH SERPL DL<=0.005 MIU/L-ACNC: 4.35 UIU/ML (ref 0.45–4.5)
VLDLC SERPL CALC-MCNC: 15 MG/DL (ref 5–40)
WBC # BLD AUTO: 5.5 X10E3/UL (ref 3.4–10.8)

## 2020-03-27 NOTE — PROGRESS NOTES
Curt, your TSH is in upper normal range. Please take 1 & 1/2 tablet twice a week & 1 tablet rest of the week. Repeat TSH in 2 months. Your cholesterol came high. Are you doing any exercise ( running or weight lifting)?

## 2020-04-03 LAB — HEMOCCULT STL QL IA: NEGATIVE

## 2020-04-30 DIAGNOSIS — E03.9 ACQUIRED HYPOTHYROIDISM: ICD-10-CM

## 2020-04-30 RX ORDER — LEVOTHYROXINE SODIUM 50 UG/1
TABLET ORAL
Qty: 90 TAB | Refills: 0 | Status: SHIPPED | OUTPATIENT
Start: 2020-04-30 | End: 2020-07-26

## 2020-05-21 ENCOUNTER — TELEPHONE (OUTPATIENT)
Dept: PRIMARY CARE CLINIC | Age: 50
End: 2020-05-21

## 2020-05-21 NOTE — TELEPHONE ENCOUNTER
We can do a virtual tomorrow & if needed I can bring him next week. He can check temperature at home.

## 2020-05-26 ENCOUNTER — HOSPITAL ENCOUNTER (EMERGENCY)
Age: 50
Discharge: HOME | End: 2020-05-26
Attending: EMERGENCY MEDICINE
Payer: COMMERCIAL

## 2020-05-26 VITALS
DIASTOLIC BLOOD PRESSURE: 74 MMHG | WEIGHT: 139.55 LBS | OXYGEN SATURATION: 100 % | HEIGHT: 69 IN | RESPIRATION RATE: 13 BRPM | SYSTOLIC BLOOD PRESSURE: 113 MMHG | HEART RATE: 63 BPM | TEMPERATURE: 98.7 F | BODY MASS INDEX: 20.67 KG/M2

## 2020-05-26 DIAGNOSIS — R42 VERTIGO: Primary | ICD-10-CM

## 2020-05-26 LAB
ALBUMIN SERPL-MCNC: 4.3 G/DL (ref 3.5–5)
ALBUMIN/GLOB SERPL: 1.3 {RATIO} (ref 1.1–2.2)
ALP SERPL-CCNC: 90 U/L (ref 45–117)
ALT SERPL-CCNC: 30 U/L (ref 12–78)
ANION GAP SERPL CALC-SCNC: 7 MMOL/L (ref 5–15)
AST SERPL-CCNC: 22 U/L (ref 15–37)
ATRIAL RATE: 88 BPM
BASOPHILS # BLD: 0 K/UL (ref 0–0.1)
BASOPHILS NFR BLD: 0 % (ref 0–1)
BILIRUB SERPL-MCNC: 0.4 MG/DL (ref 0.2–1)
BUN SERPL-MCNC: 14 MG/DL (ref 6–20)
BUN/CREAT SERPL: 15 (ref 12–20)
CALCIUM SERPL-MCNC: 9.3 MG/DL (ref 8.5–10.1)
CALCULATED P AXIS, ECG09: 78 DEGREES
CALCULATED R AXIS, ECG10: 64 DEGREES
CALCULATED T AXIS, ECG11: 80 DEGREES
CHLORIDE SERPL-SCNC: 105 MMOL/L (ref 97–108)
CO2 SERPL-SCNC: 32 MMOL/L (ref 21–32)
COMMENT, HOLDF: NORMAL
CREAT SERPL-MCNC: 0.91 MG/DL (ref 0.7–1.3)
DIAGNOSIS, 93000: NORMAL
DIFFERENTIAL METHOD BLD: NORMAL
EOSINOPHIL # BLD: 0 K/UL (ref 0–0.4)
EOSINOPHIL NFR BLD: 0 % (ref 0–7)
ERYTHROCYTE [DISTWIDTH] IN BLOOD BY AUTOMATED COUNT: 11.9 % (ref 11.5–14.5)
GLOBULIN SER CALC-MCNC: 3.2 G/DL (ref 2–4)
GLUCOSE SERPL-MCNC: 146 MG/DL (ref 65–100)
HCT VFR BLD AUTO: 45.9 % (ref 36.6–50.3)
HGB BLD-MCNC: 14.8 G/DL (ref 12.1–17)
IMM GRANULOCYTES # BLD AUTO: 0 K/UL (ref 0–0.04)
IMM GRANULOCYTES NFR BLD AUTO: 0 % (ref 0–0.5)
LYMPHOCYTES # BLD: 2.6 K/UL (ref 0.8–3.5)
LYMPHOCYTES NFR BLD: 37 % (ref 12–49)
MCH RBC QN AUTO: 28.5 PG (ref 26–34)
MCHC RBC AUTO-ENTMCNC: 32.2 G/DL (ref 30–36.5)
MCV RBC AUTO: 88.4 FL (ref 80–99)
MONOCYTES # BLD: 0.5 K/UL (ref 0–1)
MONOCYTES NFR BLD: 7 % (ref 5–13)
NEUTS SEG # BLD: 3.8 K/UL (ref 1.8–8)
NEUTS SEG NFR BLD: 56 % (ref 32–75)
NRBC # BLD: 0 K/UL (ref 0–0.01)
NRBC BLD-RTO: 0 PER 100 WBC
P-R INTERVAL, ECG05: 134 MS
PLATELET # BLD AUTO: 258 K/UL (ref 150–400)
PMV BLD AUTO: 9.8 FL (ref 8.9–12.9)
POTASSIUM SERPL-SCNC: 3.8 MMOL/L (ref 3.5–5.1)
PROT SERPL-MCNC: 7.5 G/DL (ref 6.4–8.2)
Q-T INTERVAL, ECG07: 382 MS
QRS DURATION, ECG06: 106 MS
QTC CALCULATION (BEZET), ECG08: 462 MS
RBC # BLD AUTO: 5.19 M/UL (ref 4.1–5.7)
SAMPLES BEING HELD,HOLD: NORMAL
SODIUM SERPL-SCNC: 144 MMOL/L (ref 136–145)
VENTRICULAR RATE, ECG03: 88 BPM
WBC # BLD AUTO: 6.9 K/UL (ref 4.1–11.1)

## 2020-05-26 PROCEDURE — 93005 ELECTROCARDIOGRAM TRACING: CPT

## 2020-05-26 PROCEDURE — 80053 COMPREHEN METABOLIC PANEL: CPT

## 2020-05-26 PROCEDURE — 74011250636 HC RX REV CODE- 250/636: Performed by: EMERGENCY MEDICINE

## 2020-05-26 PROCEDURE — 36415 COLL VENOUS BLD VENIPUNCTURE: CPT

## 2020-05-26 PROCEDURE — 85025 COMPLETE CBC W/AUTO DIFF WBC: CPT

## 2020-05-26 PROCEDURE — 99284 EMERGENCY DEPT VISIT MOD MDM: CPT

## 2020-05-26 RX ORDER — MECLIZINE HCL 12.5 MG 12.5 MG/1
12.5 TABLET ORAL
Status: COMPLETED | OUTPATIENT
Start: 2020-05-26 | End: 2020-05-26

## 2020-05-26 RX ORDER — MECLIZINE HCL 12.5 MG 12.5 MG/1
12.5 TABLET ORAL
Qty: 12 TAB | Refills: 0 | Status: SHIPPED | OUTPATIENT
Start: 2020-05-26 | End: 2020-06-05

## 2020-05-26 RX ADMIN — SODIUM CHLORIDE 1000 ML: 900 INJECTION, SOLUTION INTRAVENOUS at 20:07

## 2020-05-26 RX ADMIN — MECLIZINE 12.5 MG: 12.5 TABLET ORAL at 20:04

## 2020-05-26 NOTE — ED TRIAGE NOTES
Pt arrives ambulatory to ed with complaints of intermittent dizziness worse with movement/ leaning forward since 0815 today. Pt denies dizziness at this time. Pt states hx of same. Denies vertigo per ENT evaluation.

## 2020-05-27 ENCOUNTER — TELEPHONE (OUTPATIENT)
Dept: PRIMARY CARE CLINIC | Age: 50
End: 2020-05-27

## 2020-05-27 NOTE — ED PROVIDER NOTES
49-year-old male with past medical history of hypothyroidism, GERD presents with complaints of 2 episodes room spinning sensation today after leaning head forward lasting seconds. Patient denies any dizziness/room spinning sensation currently. Patient reports he has had similar episodes intermittently over the past several years and has been evaluated by ENT, neurology, primary care, GI for the same symptoms. Patient reports he had an MRI of his brain and neck approximately 2 years ago for the same symptoms. Patient reports when he went to ENT they could not elicit the symptoms and told him it was not vertigo at the time. Denies any recent illness. Denies any associated symptoms including nausea vomiting. Denies headache. Denies head trauma. Denies hearing loss. Denies any recent infection, fever, chills, cough, shortness of breath. Denies coronavirus exposure. Eating well, normal urine output and bowel movements.     Primary care physician[facility]           Past Medical History:   Diagnosis Date    Acquired hypothyroidism 4/10/2017    GERD (gastroesophageal reflux disease)     Helicobacter pylori gastritis 03/2018       Past Surgical History:   Procedure Laterality Date    HX COLONOSCOPY  03/2018    Dr. Jesika Harry, repeat 10 years    HX ENDOSCOPY  03/2018    Dr. Jesika Harry         Family History:   Problem Relation Age of Onset    Thyroid Disease Mother     Elevated Lipids Mother     No Known Problems Father        Social History     Socioeconomic History    Marital status:      Spouse name: Not on file    Number of children: Not on file    Years of education: Not on file    Highest education level: Not on file   Occupational History    Not on file   Social Needs    Financial resource strain: Not on file    Food insecurity     Worry: Not on file     Inability: Not on file    Transportation needs     Medical: Not on file     Non-medical: Not on file Tobacco Use    Smoking status: Never Smoker    Smokeless tobacco: Never Used   Substance and Sexual Activity    Alcohol use: No    Drug use: No    Sexual activity: Yes     Partners: Female     Birth control/protection: Condom   Lifestyle    Physical activity     Days per week: Not on file     Minutes per session: Not on file    Stress: Not on file   Relationships    Social connections     Talks on phone: Not on file     Gets together: Not on file     Attends Buddhist service: Not on file     Active member of club or organization: Not on file     Attends meetings of clubs or organizations: Not on file     Relationship status: Not on file    Intimate partner violence     Fear of current or ex partner: Not on file     Emotionally abused: Not on file     Physically abused: Not on file     Forced sexual activity: Not on file   Other Topics Concern    Not on file   Social History Narrative    Not on file         ALLERGIES: Patient has no known allergies. Review of Systems   Constitutional: Negative for chills and fever. HENT: Negative for congestion, nosebleeds and sore throat. Eyes: Negative for pain and discharge. Respiratory: Negative for cough and shortness of breath. Cardiovascular: Negative for chest pain and palpitations. Gastrointestinal: Negative for abdominal pain, constipation, nausea and vomiting. Genitourinary: Negative for decreased urine volume, dysuria, flank pain and urgency. Musculoskeletal: Negative for gait problem and myalgias. Skin: Negative for rash and wound. Neurological: Positive for dizziness. Negative for seizures and syncope. Hematological: Does not bruise/bleed easily. Psychiatric/Behavioral: Negative for confusion, self-injury and suicidal ideas.        Vitals:    05/26/20 1914   BP: 140/88   Pulse: 94   Resp: 16   Temp: 98.7 °F (37.1 °C)   SpO2: 98%   Weight: 63.3 kg (139 lb 8.8 oz)   Height: 5' 9\" (1.753 m)            Physical Exam  Vitals signs and nursing note reviewed. Constitutional:       Appearance: He is well-developed. HENT:      Head: Normocephalic and atraumatic. Eyes:      General: No visual field deficit. Pupils: Pupils are equal, round, and reactive to light. Neck:      Musculoskeletal: Normal range of motion and neck supple. Cardiovascular:      Rate and Rhythm: Normal rate and regular rhythm. Heart sounds: Normal heart sounds. Pulmonary:      Effort: Pulmonary effort is normal. No respiratory distress. Breath sounds: Normal breath sounds. No wheezing. Abdominal:      General: Bowel sounds are normal.      Palpations: Abdomen is soft. Tenderness: There is no abdominal tenderness. There is no guarding or rebound. Musculoskeletal: Normal range of motion. Skin:     General: Skin is warm and dry. Neurological:      General: No focal deficit present. Mental Status: He is alert and oriented to person, place, and time. GCS: GCS eye subscore is 4. GCS verbal subscore is 5. GCS motor subscore is 6. Cranial Nerves: No facial asymmetry. Sensory: No sensory deficit. Motor: No weakness. Coordination: Coordination normal.      Gait: Gait is intact. Psychiatric:         Behavior: Behavior normal.          MDM  Number of Diagnoses or Management Options  Vertigo:   Diagnosis management comments: 57-year-old male plaints of intermittent vertigo symptoms lasting seconds after leaning forward or moving head with 2 episodes today, multiple episodes over the past several years. Reproducible with head movement in the emergency department, resolved after 5 to 10 seconds. Patient is well-appearing, afebrile, nontoxic, hemodynamically stable, neurologically intact, no respiratory distress. PlanIV fluid hydration, CBC/CMP, EKG, cardiac monitor, review of records.     Labs unremarkable       Amount and/or Complexity of Data Reviewed  Clinical lab tests: ordered and reviewed  Independent visualization of images, tracings, or specimens: yes    Patient Progress  Patient progress: improved         Procedures    ED EKG interpretation:  Rhythm: normal sinus rhythm; and regular . Rate (approx.): 88; Axis: normal; P wave: normal; QRS interval: normal ; ST/T wave: normal; no acute ischemia, normal. This EKG was interpreted by Diana Nino MD,ED Provider. 9pm  Pt reports no return of symptoms after meclizine and attempting to elicit with head movement. Ambulatory w/o difficulty. Review of records show MRI from 4/2018 with nonspecific james lesion and recommended repeat exam in 6 months. Pt reports he never had repeat imaging. 10:31 PM  Pt reports he does not want to stay for the MRI. Discussed with patient that MRI tech is on the way to the emergency department. Patient reports he would rather go home and follow-up with his neurologist for a repeat MRI. Patient is well-appearing, no acute distress, hemodynamically stable, neurologically intact. Discussed results with patient. Requesting discharge and follow-up with primary care physician, neurology.

## 2020-05-27 NOTE — TELEPHONE ENCOUNTER
Patient was recently seen in ED for vertigo and needs follow up appointment. He would rather be seen in office versus virtual appointment. Please advise.  CB# [phone]

## 2020-05-27 NOTE — DISCHARGE INSTRUCTIONS
Patient Education        Vertigo: Care Instructions  Your Care Instructions    Vertigo is the feeling that you or your surroundings are moving when there is no actual movement. It is often described as a feeling of spinning, whirling, falling, or tilting. Vertigo may make you vomit or feel nauseated. You may have trouble standing or walking and may lose your balance. Vertigo is often related to an inner ear problem, but it can have other more serious causes. If vertigo continues, you may need more tests to find its cause. Follow-up care is a key part of your treatment and safety. Be sure to make and go to all appointments, and call your doctor if you are having problems. It's also a good idea to know your test results and keep a list of the medicines you take. How can you care for yourself at home? · Do not lie flat on your back. Prop yourself up slightly. This may reduce the spinning feeling. Keep your eyes open. · Move slowly so that you do not fall. · If your doctor recommends medicine, take it exactly as directed. · Do not drive while you are having vertigo. Certain exercises, called Lu-Daroff exercises, can help decrease vertigo. To do Lu-Daroff exercises:  · Sit on the edge of a bed or sofa and quickly lie down on the side that causes the worst vertigo. Lie on your side with your ear down. · Stay in this position for at least 30 seconds or until the vertigo goes away. · Sit up. If this causes vertigo, wait for it to stop. · Repeat the procedure on the other side. · Repeat this 10 times. Do these exercises 2 times a day until the vertigo is gone. When should you call for help? Call 911 anytime you think you may need emergency care. For example, call if:    · You passed out (lost consciousness).     · You have symptoms of a stroke. These may include:  ? Sudden numbness, tingling, weakness, or loss of movement in your face, arm, or leg, especially on only one side of your body.   ? Sudden vision changes. ? Sudden trouble speaking. ? Sudden confusion or trouble understanding simple statements. ? Sudden problems with walking or balance. ? A sudden, severe headache that is different from past headaches.    Call your doctor now or seek immediate medical care if:    · Vertigo occurs with a fever, a headache, or ringing in your ears.     · You have new or increased nausea and vomiting.    Watch closely for changes in your health, and be sure to contact your doctor if:    · Vertigo gets worse or happens more often.     · Vertigo has not gotten better after 2 weeks. Where can you learn more? Go to http://ginette-sheron.info/  Enter X802 in the search box to learn more about \"Vertigo: Care Instructions. \"  Current as of: July 28, 2019Content Version: 12.4  © 7080-2549 [employer]. Care instructions adapted under license by [employer] (which disclaims liability or warranty for this information). If you have questions about a medical condition or this instruction, always ask your healthcare professional. [place] [address] any warranty or liability for your use of this information. Patient Education        Epley Maneuver at Home for Vertigo: Exercises  Introduction  Vertigo is a spinning or whirling sensation when you move your head. Your doctor may have moved you in different positions to help your vertigo get better faster. This is called the Epley maneuver. Your doctor also may have asked you to do these exercises at home. Do the exercises as often as your doctor recommends. If your vertigo is getting worse, your doctor may have you change the exercise or stop it. Step 1  Step 1   1. Sit on the edge of a bed or sofa. Step 2   1. Turn your head 45 degrees in the direction your doctor told you to. This should be toward the ear that causes the most vertigo for you. In this picture, the woman is turning toward her left ear.     Step 3 1. Tilt yourself backward until you are lying on your back. Your head should still be at a 45-degree turn. Your head should be about midway between looking straight ahead and looking out to your side. Hold for 30 seconds. If you have vertigo, stay in this position until it stops. Step 4   1. Turn your head 90 degrees toward the ear that has the least vertigo. In this picture, the woman is turning to the right because she has vertigo on her left side. The point of your chin should be raised and over your shoulder. Hold for 30 seconds. Step 5   1. Roll onto the side with the least vertigo. You should now be looking at the floor. Hold for 30 seconds. Follow-up care is a key part of your treatment and safety. Be sure to make and go to all appointments, and call your doctor if you are having problems. It's also a good idea to know your test results and keep a list of the medicines you take. Where can you learn more? Go to http://ginette-sheron.info/  Enter P834 in the search box to learn more about \"Epley Maneuver at Home for Vertigo: Exercises. \"  Current as of: November 19, 2019Content Version: 12.4  © 9264-2559 [employer]. Care instructions adapted under license by [employer] (which disclaims liability or warranty for this information). If you have questions about a medical condition or this instruction, always ask your healthcare professional. [place] [address] any warranty or liability for your use of this information. Patient Education        Cawthorne Exercises for Vertigo: Care Instructions  Your Care Instructions  Simple exercises can help you regain your balance when you have vertigo. If you have Ménière's disease, benign paroxysmal positional vertigo (BPPV), or another inner ear problem, you may have vertigo off and on. Do these exercises first thing in the morning and before you go to bed.  You might get dizzy when you first start them. If this happens, try to do them for at least 5 minutes. Do a group of exercises at a time, starting at the top of the list. It may take several weeks before you can do all the exercises without feeling dizzy. Follow-up care is a key part of your treatment and safety. Be sure to make and go to all appointments, and call your doctor if you are having problems. It's also a good idea to know your test results and keep a list of the medicines you take. How can you care for yourself at home? Exercise 1  While sitting on the side of the bed and holding your head still:  · Look up as far as you can. · Look down as far as you can. · Look from side to side as far as you can. · Stretch your arm straight out in front of you. Focus on your index finger. Continue to focus on your finger while you bring it to your nose. Exercise 2  While sitting on the side of the bed:  · Bring your head as far back as you can. · Bring your head forward to touch your chin to your chest.  · Turn your head from side to side. · Do these exercises first with your eyes open. Then try with your eyes closed. Exercise 3  While sitting on the side of the bed:  · Shrug your shoulders straight upward, then relax them. · Bend over and try to touch the ground with your fingers. Then go back to a sitting position. · Toss a small ball from one hand to the other. Throw the ball higher than your eyes so you have to look up. Exercise 4  While standing (with someone close by if you feel uncomfortable):  · Repeat Exercise 1.  · Repeat Exercise 2.  · Pass a ball between your legs and above your head. · Sit down and then stand up. Repeat. Turn around in a [place] a different way each time you stand. · With someone close by to help you, try the above exercises with your eyes closed. Exercise 5  In a room that is cleared of obstacles:  · Walk to a corner of the room, turn to your right, and walk back to the starting point.  Now, repeat and turn left.  · Walk up and down a slope. Now try stairs. · While holding on to someone's arm, try these exercises with your eyes closed. When should you call for help? Watch closely for changes in your health, and be sure to contact your doctor if:    · You do not get better as expected. Where can you learn more? Go to http://ginette-sheron.info/  Enter A743 in the search box to learn more about \"Cawthorne Exercises for Vertigo: Care Instructions. \"  Current as of: July 28, 2019Content Version: 12.4  © 1601-1902 [employer]. Care instructions adapted under license by [employer] (which disclaims liability or warranty for this information). If you have questions about a medical condition or this instruction, always ask your healthcare professional. [address] any warranty or liability for your use of this information.

## 2020-05-27 NOTE — ED NOTES
Pt discharged in stable condition at this time. Denies any needs or questions. Pt ambulatory to exit.

## 2020-05-28 ENCOUNTER — OFFICE VISIT (OUTPATIENT)
Dept: PRIMARY CARE CLINIC | Age: 50
End: 2020-05-28

## 2020-05-28 VITALS
HEIGHT: 69 IN | WEIGHT: 139 LBS | SYSTOLIC BLOOD PRESSURE: 107 MMHG | RESPIRATION RATE: 16 BRPM | DIASTOLIC BLOOD PRESSURE: 72 MMHG | TEMPERATURE: 98.2 F | OXYGEN SATURATION: 99 % | BODY MASS INDEX: 20.59 KG/M2 | HEART RATE: 98 BPM

## 2020-05-28 DIAGNOSIS — R42 DIZZINESS: Primary | ICD-10-CM

## 2020-05-28 DIAGNOSIS — M54.2 CERVICALGIA: ICD-10-CM

## 2020-05-28 DIAGNOSIS — E03.9 ACQUIRED HYPOTHYROIDISM: ICD-10-CM

## 2020-05-28 NOTE — PROGRESS NOTES
Written by Ld Frias, as dictated by Dr. Valentin Vera MD.    Racheal Shirley is a 44 y.o. male. HPI     The patient presents today for a follow-up. He is not fasting for labs. He presented to the ED on 5/26/2020 for c/o of episodes of vertigo with movement. He was prescribed Meclizine 12.5 mg and that has helped him symptoms. He has had these episodes before throughout the years and has been seen by ENT and Dr. Peyton Leija (Neurology) He reports when he woke up on 5/26/2020 he sat up and his room went sideways and then he was fine. He then dropped something while he was work and bent down to pick something up and immediatly felt dizziness. He laid down on the floor afterwards and tried moving his neck around and he did not get dizzy but did get dizziness when he sat back up. He had an MRI on his neck and brain in 2018  which showed small sub centimeter lesion of the james, nonverbal specific appearance which could represent a small hamartoma or a low-grade neoplasm. He was seen by Neurologist afterwards & was told no need to be concerned. He does have seasonal allergies. He does feel pressure on the R side of his neck but it does not feel like a headache. He does have some jaw pain and believes he grinds his teeth because he does have some jaw pain. He has been going to PT for his  back pain and it has been helping him. He has a hx of hypothyroidism and is compliant on synthroid 50 mcg. He does take Vitamin D 1,000 units. He does not eat a lot of red meat. Patient Active Problem List   Diagnosis Code    Acquired hypothyroidism E03.9    GERD (gastroesophageal reflux disease) K21.9    History of Helicobacter pylori infection Z86.19        Current Outpatient Medications on File Prior to Visit   Medication Sig Dispense Refill    meclizine (ANTIVERT) 12.5 mg tablet Take 1 Tab by mouth three (3) times daily as needed for Dizziness for up to 10 days.  12 Tab 0    levothyroxine (SYNTHROID) 50 mcg tablet TAKE 1 TAB BY MOUTH DAILY BEFORE BREAKFAST 90 Tab 0    ergocalciferol (ERGOCALCIFEROL) 50,000 unit capsule Take 1 Cap by mouth every seven (7) days. 8 Cap 0    cyanocobalamin (VITAMIN B-12) 1,000 mcg tablet Take 1,000 mcg by mouth daily.  multivitamin (ONE A DAY) tablet Take 1 Tab by mouth daily.  cholecalciferol (VITAMIN D3) 1,000 unit tablet Take 1 Tab by mouth two (2) times a day. 60 Tab 3    calcium carbonate (TUMS) 200 mg calcium (500 mg) chew Take 1 Tab by mouth as needed. No current facility-administered medications on file prior to visit.         No Known Allergies    Past Medical History:   Diagnosis Date    Acquired hypothyroidism 4/10/2017    GERD (gastroesophageal reflux disease)     Helicobacter pylori gastritis 03/2018       Past Surgical History:   Procedure Laterality Date    HX COLONOSCOPY  03/2018    Dr. Lakia Guy, repeat 10 years    HX ENDOSCOPY  03/2018    Dr. Lakia Guy       Family History   Problem Relation Age of Onset    Thyroid Disease Mother     Elevated Lipids Mother     No Known Problems Father        Social History     Socioeconomic History    Marital status:      Spouse name: Not on file    Number of children: Not on file    Years of education: Not on file    Highest education level: Not on file   Occupational History    Not on file   Social Needs    Financial resource strain: Not on file    Food insecurity     Worry: Not on file     Inability: Not on file   [employer] needs     Medical: Not on file     Non-medical: Not on file   Tobacco Use    Smoking status: Never Smoker    Smokeless tobacco: Never Used   Substance and Sexual Activity    Alcohol use: No    Drug use: No    Sexual activity: Yes     Partners: Female     Birth control/protection: Condom   Lifestyle    Physical activity     Days per week: Not on file     Minutes per session: Not on file    Stress: Not on file   Relationships  Social connections     Talks on phone: Not on file     Gets together: Not on file     Attends Sabianist service: Not on file     Active member of club or organization: Not on file     Attends meetings of clubs or organizations: Not on file     Relationship status: Not on file    Intimate partner violence     Fear of current or ex partner: Not on file     Emotionally abused: Not on file     Physically abused: Not on file     Forced sexual activity: Not on file   Other Topics Concern    Not on file   Social History Narrative    Not on file       Admission on 05/26/2020, Discharged on 05/26/2020   Component Date Value Ref Range Status    Ventricular Rate 05/26/2020 88  BPM Final    Atrial Rate 05/26/2020 88  BPM Final    P-R Interval 05/26/2020 134  ms Final    QRS Duration 05/26/2020 106  ms Final    Q-T Interval 05/26/2020 382  ms Final    QTC Calculation (Bezet) 05/26/2020 462  ms Final    Calculated P Axis 05/26/2020 78  degrees Final    Calculated R Axis 05/26/2020 64  degrees Final    Calculated T Axis 05/26/2020 80  degrees Final    Diagnosis 05/26/2020    Final                    Value:Normal sinus rhythm  Normal ECG  When compared with ECG of 25-MAY-2018 14:30,  No significant change was found  Confirmed by Abbey Knutson M.D., Janae dEmonds ([id number]) on 5/26/2020 8:37:40 PM      SAMPLES BEING HELD 05/26/2020 1RED 1BLUE 1LAV 1GRN   Final    COMMENT 05/26/2020 Add-on orders for these samples will be processed based on acceptable specimen integrity and analyte stability, which may vary by analyte. Final    WBC 05/26/2020 6.9  4.1 - 11.1 K/uL Final    RBC 05/26/2020 5.19  4. 10 - 5.70 M/uL Final    HGB 05/26/2020 14.8  12.1 - 17.0 g/dL Final    HCT 05/26/2020 45.9  36.6 - 50.3 % Final    MCV 05/26/2020 88.4  80.0 - 99.0 FL Final    MCH 05/26/2020 28.5  26.0 - 34.0 PG Final    MCHC 05/26/2020 32.2  30.0 - 36.5 g/dL Final    RDW 05/26/2020 11.9  11.5 - 14.5 % Final    PLATELET 34/77/5353 544  150 - 400 K/uL Final    MPV 05/26/2020 9.8  8.9 - 12.9 FL Final    NRBC 05/26/2020 0.0  0  WBC Final    ABSOLUTE NRBC 05/26/2020 0.00  0.00 - 0.01 K/uL Final    NEUTROPHILS 05/26/2020 56  32 - 75 % Final    LYMPHOCYTES 05/26/2020 37  12 - 49 % Final    MONOCYTES 05/26/2020 7  5 - 13 % Final    EOSINOPHILS 05/26/2020 0  0 - 7 % Final    BASOPHILS 05/26/2020 0  0 - 1 % Final    IMMATURE GRANULOCYTES 05/26/2020 0  0.0 - 0.5 % Final    ABS. NEUTROPHILS 05/26/2020 3.8  1.8 - 8.0 K/UL Final    ABS. LYMPHOCYTES 05/26/2020 2.6  0.8 - 3.5 K/UL Final    ABS. MONOCYTES 05/26/2020 0.5  0.0 - 1.0 K/UL Final    ABS. EOSINOPHILS 05/26/2020 0.0  0.0 - 0.4 K/UL Final    ABS. BASOPHILS 05/26/2020 0.0  0.0 - 0.1 K/UL Final    ABS. IMM. GRANS. 05/26/2020 0.0  0.00 - 0.04 K/UL Final    DF 05/26/2020 AUTOMATED    Final    Sodium 05/26/2020 144  136 - 145 mmol/L Final    Potassium 05/26/2020 3.8  3.5 - 5.1 mmol/L Final    Chloride 05/26/2020 105  97 - 108 mmol/L Final    CO2 05/26/2020 32  21 - 32 mmol/L Final    Anion gap 05/26/2020 7  5 - 15 mmol/L Final    Glucose 05/26/2020 146* 65 - 100 mg/dL Final    BUN 05/26/2020 14  6 - 20 MG/DL Final    Creatinine 05/26/2020 0.91  0.70 - 1.30 MG/DL Final    BUN/Creatinine ratio 05/26/2020 15  12 - 20   Final    GFR est AA 05/26/2020 >60  >60 ml/min/1.73m2 Final    GFR est non-AA 05/26/2020 >60  >60 ml/min/1.73m2 Final    Comment: Estimated GFR is calculated using the IDMS-traceable Modification of Diet in Renal Disease (MDRD) Study equation, reported for both  Americans (GFRAA) and non- Americans (GFRNA), and normalized to 1.73m2 body surface area. The physician must decide which value applies to the patient. The MDRD study equation should only be used in individuals age 25 or older.  It has not been validated for the following: pregnant women, patients with serious comorbid conditions, or on certain medications, or persons with extremes of body size, muscle mass, or nutritional status.  Calcium 05/26/2020 9.3  8.5 - 10.1 MG/DL Final    Bilirubin, total 05/26/2020 0.4  0.2 - 1.0 MG/DL Final    ALT (SGPT) 05/26/2020 30  12 - 78 U/L Final    AST (SGOT) 05/26/2020 22  15 - 37 U/L Final    Alk. phosphatase 05/26/2020 90  45 - 117 U/L Final    Protein, total 05/26/2020 7.5  6.4 - 8.2 g/dL Final    Albumin 05/26/2020 4.3  3.5 - 5.0 g/dL Final    Globulin 05/26/2020 3.2  2.0 - 4.0 g/dL Final    A-G Ratio 05/26/2020 1.3  1.1 - 2.2   Final   Office Visit on 03/23/2020   Component Date Value Ref Range Status    TSH 03/25/2020 4.350  0.450 - 4.500 uIU/mL Final    WBC 03/25/2020 5.5  3.4 - 10.8 x10E3/uL Final    RBC 03/25/2020 5.54  4.14 - 5.80 x10E6/uL Final    HGB 03/25/2020 15.2  13.0 - 17.7 g/dL Final    HCT 03/25/2020 47.0  37.5 - 51.0 % Final    MCV 03/25/2020 85  79 - 97 fL Final    MCH 03/25/2020 27.4  26.6 - 33.0 pg Final    MCHC 03/25/2020 32.3  31.5 - 35.7 g/dL Final    RDW 03/25/2020 11.8  11.6 - 15.4 % Final    PLATELET 21/78/0793 892  150 - 450 x10E3/uL Final    Glucose 03/25/2020 99  65 - 99 mg/dL Final    BUN 03/25/2020 18  6 - 24 mg/dL Final    Creatinine 03/25/2020 0.93  0.76 - 1.27 mg/dL Final    GFR est non-AA 03/25/2020 95  >59 mL/min/1.73 Final    GFR est AA 03/25/2020 109  >59 mL/min/1.73 Final    BUN/Creatinine ratio 03/25/2020 19  9 - 20 Final    Sodium 03/25/2020 147* 134 - 144 mmol/L Final    Potassium 03/25/2020 4.6  3.5 - 5.2 mmol/L Final    Chloride 03/25/2020 106  96 - 106 mmol/L Final    CO2 03/25/2020 26  20 - 29 mmol/L Final    Calcium 03/25/2020 9.8  8.7 - 10.2 mg/dL Final    Protein, total 03/25/2020 6.8  6.0 - 8.5 g/dL Final    Albumin 03/25/2020 4.9  3.8 - 4.9 g/dL Final    GLOBULIN, TOTAL 03/25/2020 1.9  1.5 - 4.5 g/dL Final    A-G Ratio 03/25/2020 2.6* 1.2 - 2.2 Final    Bilirubin, total 03/25/2020 0.7  0.0 - 1.2 mg/dL Final    Alk.  phosphatase 03/25/2020 85  39 - 117 IU/L Final    AST (SGOT) 03/25/2020 28  0 - 40 IU/L Final    ALT (SGPT) 03/25/2020 33  0 - 44 IU/L Final    Cholesterol, total 03/25/2020 209* 100 - 199 mg/dL Final    Triglyceride 03/25/2020 74  0 - 149 mg/dL Final    HDL Cholesterol 03/25/2020 68  >39 mg/dL Final    VLDL, calculated 03/25/2020 15  5 - 40 mg/dL Final    LDL, calculated 03/25/2020 126* 0 - 99 mg/dL Final    Occult blood fecal, by [place] 03/30/2020 Negative  Negative Final     Review of Systems   Constitutional: Negative for malaise/fatigue. HENT: Negative for congestion. Eyes: Negative for blurred vision. Respiratory: Negative for shortness of breath. Cardiovascular: Negative for chest pain and leg swelling. Gastrointestinal: Negative for constipation, diarrhea and heartburn. Genitourinary: Negative for dysuria, frequency and urgency. Musculoskeletal: Positive for neck pain. Negative for joint pain and myalgias. Neurological: Positive for dizziness. Negative for headaches. Endo/Heme/Allergies: Positive for environmental allergies. Psychiatric/Behavioral: Negative for depression and substance abuse. The patient is not nervous/anxious and does not have insomnia. Physical Exam  Vitals signs and nursing note reviewed. Constitutional:       General: He is not in acute distress. Appearance: He is not diaphoretic. HENT:      Head: Normocephalic and atraumatic. Right Ear: External ear normal.      Left Ear: External ear normal.   Eyes:      General:         Right eye: No discharge. Left eye: No discharge. Conjunctiva/sclera: Conjunctivae normal.      Pupils: Pupils are equal, round, and reactive to light. Neck:      Musculoskeletal: Normal range of motion and neck supple. Cardiovascular:      Rate and Rhythm: Normal rate and regular rhythm. Heart sounds: Normal heart sounds. No murmur. No friction rub. No gallop. Pulmonary:      Effort: Pulmonary effort is normal.      Breath sounds: Normal breath sounds.  No wheezing. Abdominal:      General: Bowel sounds are normal.      Palpations: Abdomen is soft. Tenderness: There is no abdominal tenderness. Musculoskeletal: Normal range of motion. Neurological:      Mental Status: He is alert and oriented to person, place, and time. Psychiatric:         Behavior: Behavior normal.         Thought Content: Thought content normal.         ASSESSMENT and PLAN    ICD-10-CM ICD-9-CM    1. Dizziness R42 780.4 Advised pt to keep taking Meclizine for his dizziness     If dizziness does not resolve or come back will do a repeat MRI. 2. Acquired hypothyroidism E03.9 244.9 TSH RFX ON ABNORMAL TO FREE T4    TSH ordered    Compliant on Synthroid 50 mcg    3. Cervicalgia M54.2 723.1 Pt is currently doing PT for his back pain and it has been helping . This plan was reviewed with the patient and patient agrees. All questions were answered. This scribe documentation was reviewed by me and accurately reflects the examination and decisions made by me. This note will not be viewable in [address]

## 2020-05-28 NOTE — PROGRESS NOTES
Chief Complaint   Patient presents with    Dizziness     has been having some problems with dizziness and has taken meclizine for the dizziness and it seem to help

## 2020-05-29 LAB — TSH SERPL DL<=0.005 MIU/L-ACNC: 3.49 UIU/ML (ref 0.45–4.5)

## 2020-06-18 ENCOUNTER — HOSPITAL ENCOUNTER (EMERGENCY)
Age: 50
Discharge: HOME | End: 2020-06-18
Attending: STUDENT IN AN ORGANIZED HEALTH CARE EDUCATION/TRAINING PROGRAM
Payer: COMMERCIAL

## 2020-06-18 ENCOUNTER — APPOINTMENT (OUTPATIENT)
Dept: CT IMAGING | Age: 50
End: 2020-06-18
Attending: STUDENT IN AN ORGANIZED HEALTH CARE EDUCATION/TRAINING PROGRAM
Payer: COMMERCIAL

## 2020-06-18 VITALS
HEART RATE: 63 BPM | SYSTOLIC BLOOD PRESSURE: 120 MMHG | DIASTOLIC BLOOD PRESSURE: 79 MMHG | OXYGEN SATURATION: 97 % | RESPIRATION RATE: 16 BRPM | TEMPERATURE: 98.3 F | WEIGHT: 142.2 LBS | HEIGHT: 69 IN | BODY MASS INDEX: 21.06 KG/M2

## 2020-06-18 DIAGNOSIS — N40.0 ENLARGED PROSTATE: ICD-10-CM

## 2020-06-18 DIAGNOSIS — R50.9 SUBJECTIVE FEVER: Primary | ICD-10-CM

## 2020-06-18 DIAGNOSIS — R10.9 ABDOMINAL DISCOMFORT: ICD-10-CM

## 2020-06-18 LAB
ALBUMIN SERPL-MCNC: 3.9 G/DL (ref 3.5–5)
ALBUMIN/GLOB SERPL: 1.3 {RATIO} (ref 1.1–2.2)
ALP SERPL-CCNC: 89 U/L (ref 45–117)
ALT SERPL-CCNC: 55 U/L (ref 12–78)
ANION GAP SERPL CALC-SCNC: 6 MMOL/L (ref 5–15)
APPEARANCE UR: CLEAR
AST SERPL-CCNC: 53 U/L (ref 15–37)
BACTERIA URNS QL MICRO: NEGATIVE /HPF
BASOPHILS # BLD: 0 K/UL (ref 0–0.1)
BASOPHILS NFR BLD: 0 % (ref 0–1)
BILIRUB SERPL-MCNC: 0.4 MG/DL (ref 0.2–1)
BILIRUB UR QL: NEGATIVE
BUN SERPL-MCNC: 18 MG/DL (ref 6–20)
BUN/CREAT SERPL: 21 (ref 12–20)
CALCIUM SERPL-MCNC: 9 MG/DL (ref 8.5–10.1)
CHLORIDE SERPL-SCNC: 106 MMOL/L (ref 97–108)
CO2 SERPL-SCNC: 32 MMOL/L (ref 21–32)
COLOR UR: ABNORMAL
CREAT SERPL-MCNC: 0.87 MG/DL (ref 0.7–1.3)
DIFFERENTIAL METHOD BLD: NORMAL
EOSINOPHIL # BLD: 0.1 K/UL (ref 0–0.4)
EOSINOPHIL NFR BLD: 1 % (ref 0–7)
EPITH CASTS URNS QL MICRO: ABNORMAL /LPF
ERYTHROCYTE [DISTWIDTH] IN BLOOD BY AUTOMATED COUNT: 11.9 % (ref 11.5–14.5)
GLOBULIN SER CALC-MCNC: 2.9 G/DL (ref 2–4)
GLUCOSE SERPL-MCNC: 116 MG/DL (ref 65–100)
GLUCOSE UR STRIP.AUTO-MCNC: NEGATIVE MG/DL
HCT VFR BLD AUTO: 43.2 % (ref 36.6–50.3)
HEMOCCULT STL QL: NEGATIVE
HGB BLD-MCNC: 13.9 G/DL (ref 12.1–17)
HGB UR QL STRIP: ABNORMAL
IMM GRANULOCYTES # BLD AUTO: 0 K/UL (ref 0–0.04)
IMM GRANULOCYTES NFR BLD AUTO: 0 % (ref 0–0.5)
KETONES UR QL STRIP.AUTO: NEGATIVE MG/DL
LACTATE SERPL-SCNC: 0.8 MMOL/L (ref 0.4–2)
LEUKOCYTE ESTERASE UR QL STRIP.AUTO: NEGATIVE
LIPASE SERPL-CCNC: 167 U/L (ref 73–393)
LYMPHOCYTES # BLD: 2.1 K/UL (ref 0.8–3.5)
LYMPHOCYTES NFR BLD: 28 % (ref 12–49)
MCH RBC QN AUTO: 28.8 PG (ref 26–34)
MCHC RBC AUTO-ENTMCNC: 32.2 G/DL (ref 30–36.5)
MCV RBC AUTO: 89.4 FL (ref 80–99)
MONOCYTES # BLD: 0.6 K/UL (ref 0–1)
MONOCYTES NFR BLD: 8 % (ref 5–13)
NEUTS SEG # BLD: 4.7 K/UL (ref 1.8–8)
NEUTS SEG NFR BLD: 62 % (ref 32–75)
NITRITE UR QL STRIP.AUTO: NEGATIVE
NRBC # BLD: 0 K/UL (ref 0–0.01)
NRBC BLD-RTO: 0 PER 100 WBC
PH UR STRIP: 6 [PH] (ref 5–8)
PLATELET # BLD AUTO: 230 K/UL (ref 150–400)
PMV BLD AUTO: 9.4 FL (ref 8.9–12.9)
POTASSIUM SERPL-SCNC: 3.9 MMOL/L (ref 3.5–5.1)
PROT SERPL-MCNC: 6.8 G/DL (ref 6.4–8.2)
PROT UR STRIP-MCNC: NEGATIVE MG/DL
RBC # BLD AUTO: 4.83 M/UL (ref 4.1–5.7)
RBC #/AREA URNS HPF: ABNORMAL /HPF (ref 0–5)
SODIUM SERPL-SCNC: 144 MMOL/L (ref 136–145)
SP GR UR REFRACTOMETRY: 1.02 (ref 1–1.03)
UROBILINOGEN UR QL STRIP.AUTO: 0.2 EU/DL (ref 0.2–1)
WBC # BLD AUTO: 7.6 K/UL (ref 4.1–11.1)
WBC URNS QL MICRO: ABNORMAL /HPF (ref 0–4)

## 2020-06-18 PROCEDURE — 80053 COMPREHEN METABOLIC PANEL: CPT

## 2020-06-18 PROCEDURE — 85025 COMPLETE CBC W/AUTO DIFF WBC: CPT

## 2020-06-18 PROCEDURE — 36415 COLL VENOUS BLD VENIPUNCTURE: CPT

## 2020-06-18 PROCEDURE — 82272 OCCULT BLD FECES 1-3 TESTS: CPT

## 2020-06-18 PROCEDURE — 83605 ASSAY OF LACTIC ACID: CPT

## 2020-06-18 PROCEDURE — 83690 ASSAY OF LIPASE: CPT

## 2020-06-18 PROCEDURE — 74176 CT ABD & PELVIS W/O CONTRAST: CPT

## 2020-06-18 PROCEDURE — 99284 EMERGENCY DEPT VISIT MOD MDM: CPT

## 2020-06-18 PROCEDURE — 81001 URINALYSIS AUTO W/SCOPE: CPT

## 2020-06-18 RX ORDER — OMEPRAZOLE 20 MG/1
20 CAPSULE, DELAYED RELEASE ORAL DAILY
Qty: 14 CAP | Refills: 0 | Status: SHIPPED | OUTPATIENT
Start: 2020-06-18 | End: 2020-07-08 | Stop reason: SDUPTHER

## 2020-06-18 NOTE — ED TRIAGE NOTES
Patient arrives ambulatory for c/o, \"I have been feeling feverish for the past few days. \" Patient also c/o of 1 episode of \"blood in my stool\" 5 days pta, reports no blood since. Also, mentions intermittent mid/lower stomach pain.

## 2020-06-19 ENCOUNTER — PATIENT OUTREACH (OUTPATIENT)
Dept: INTERNAL MEDICINE CLINIC | Age: 50
End: 2020-06-19

## 2020-06-19 NOTE — ED PROVIDER NOTES
Donya Blackwood is a 44 y.o. male with past medical history notable for hypothyroidism, GERD, H. pylori gastritis evaluated and treated in 2018 presenting with subjective fever for 4 to 5 days, also one episode of bloody stool 5 days ago. States that he has had frequent subjective fevers for the past 4 to 5 days although has not had nocturnal diaphoresis, lightheadedness. Has felt more weak. He had an episode of bloody stool 5 days ago although this is not reported. He had this once before and was treated with steroid suppository. He does not have a history of PUD, varices, alcohol abuse, inflammatory bowel disease. He has not had pain with defecation, and no melena. Has not had vomiting/hematochezia. Also complaining of \"chills\" and discomfort in the his lower back. No dysuria, hematuria. No cough, no COVID-19 exposure. Is been working from home as an .            Past Medical History:   Diagnosis Date    Acquired hypothyroidism 4/10/2017    GERD (gastroesophageal reflux disease)     Helicobacter pylori gastritis 03/2018       Past Surgical History:   Procedure Laterality Date    HX COLONOSCOPY  03/2018    Dr. Bruce Salgado, repeat 10 years    HX ENDOSCOPY  03/2018    Dr. Bruce Salgado         Family History:   Problem Relation Age of Onset    Thyroid Disease Mother     Elevated Lipids Mother     No Known Problems Father        Social History     Socioeconomic History    Marital status:      Spouse name: Not on file    Number of children: Not on file    Years of education: Not on file    Highest education level: Not on file   Occupational History    Not on file   Social Needs    Financial resource strain: Not on file    Food insecurity     Worry: Not on file     Inability: Not on file    Transportation needs     Medical: Not on file     Non-medical: Not on file   Tobacco Use    Smoking status: Never Smoker    Smokeless tobacco: Never Used   Substance and Sexual Activity    Alcohol use: No    Drug use: No    Sexual activity: Yes     Partners: Female     Birth control/protection: Condom   Lifestyle    Physical activity     Days per week: Not on file     Minutes per session: Not on file    Stress: Not on file   Relationships    Social connections     Talks on phone: Not on file     Gets together: Not on file     Attends [employer] service: Not on file     Active member of club or organization: Not on file     Attends meetings of clubs or organizations: Not on file     Relationship status: Not on file    Intimate partner violence     Fear of current or ex partner: Not on file     Emotionally abused: Not on file     Physically abused: Not on file     Forced sexual activity: Not on file   Other Topics Concern    Not on file   Social History Narrative    Not on file         ALLERGIES: Patient has no known allergies. Review of Systems   Constitutional: Positive for chills, fatigue and fever. HENT: Negative for ear pain, sore throat and trouble swallowing. Eyes: Negative for visual disturbance. Respiratory: Negative for cough and shortness of breath. Cardiovascular: Negative for chest pain. Gastrointestinal: Negative for abdominal pain. Genitourinary: Negative for dysuria. Musculoskeletal: Positive for back pain. Skin: Negative for rash. Neurological: Negative for light-headedness and headaches. Psychiatric/Behavioral: Negative for confusion. All other systems reviewed and are negative. Vitals:    06/18/20 1942   BP: 134/75   Pulse: 84   Resp: 14   Temp: 98.2 °F (36.8 °C)   SpO2: 98%   Weight: 64.5 kg (142 lb 3.2 oz)   Height: 5' 9\" (1.753 m)            Physical Exam  Vitals signs reviewed. Exam conducted with a chaperone present. Constitutional:       General: He is not in acute distress. Appearance: He is not ill-appearing or diaphoretic. HENT:      Head: Normocephalic and atraumatic.       Mouth/Throat:      Mouth: Mucous membranes are moist. Pharynx: Oropharynx is clear. Cardiovascular:      Rate and Rhythm: Normal rate and regular rhythm. Heart sounds: Normal heart sounds. Pulmonary:      Effort: Pulmonary effort is normal.      Breath sounds: Normal breath sounds. Abdominal:      Tenderness: There is no abdominal tenderness. There is no guarding or rebound. Genitourinary:     Comments: No external anal masses, nontender, enlarged prostate, no prostate tenderness, brown stool  Musculoskeletal: Normal range of motion. Skin:     General: Skin is warm and dry. Capillary Refill: Capillary refill takes less than 2 seconds. Neurological:      General: No focal deficit present. Mental Status: He is alert and oriented to person, place, and time. Psychiatric:         Mood and Affect: Mood normal.          MDM  Number of Diagnoses or Management Options  Abdominal discomfort:   Enlarged prostate:   Subjective fever:   Diagnosis management comments: Patient with complaint of subjective fever, chills, did have back pain as well as microscopic hematuria, CT was obtained to rule out renal colic or signs of urinary infection which were negative. There is also no evidence of significant GI hemorrhage, his hemoglobin is somewhat lower than it was when it was recently checked however his blood pressure, remaining vitals, markers of endorgan dysfunction are all normal as well as his hemoglobin remaining within the normal range. Therefore will advise outpatient follow-up, patient states that he is going to obtain COVID testing tomorrow. He has established gastroenterology follow-up with, will contact tomorrow as well.                 Procedures

## 2020-06-19 NOTE — ED NOTES
Patient  given copy of dc instructions. Patient verbalized understanding of instructions. Patient alert and oriented and in no acute distress. Patient discharged home ambulatory with self.

## 2020-06-19 NOTE — PROGRESS NOTES
Patient contacted regarding recent discharge and COVID-19 risk. Discussed COVID-19 related testing which was not done at this time. Care Transition Nurse/ Ambulatory Care Manager contacted the patient by telephone to perform post discharge assessment. Verified name and  with patient as identifiers. Patient has following risk factors of: no known risk factors. CTN/ACM reviewed discharge instructions, medical action plan and red flags related to discharge diagnosis. Reviewed and educated them on any new and changed medications related to discharge diagnosis. Advised obtaining a 90-day supply of all daily and as-needed medications. Education provided regarding infection prevention, and signs and symptoms of COVID-19 and when to seek medical attention with patient who verbalized understanding. Discussed exposure protocols and [facility] at higher risk for severe illness  and given an opportunity for questions and concerns. The patient agrees to contact the COVID-19 hotline [phone] or PCP office for questions related to their healthcare. CTN/ACM provided contact information for future reference. From CDC: Are you at higher risk for severe illness?  Wash your hands often.  Avoid close contact (6 feet, which is about two arm lengths) with people who are sick.  Put distance between yourself and other people if COVID-19 is spreading in your community.  Clean and disinfect frequently touched surfaces.  Avoid all cruise travel and non-essential air travel.  Call your healthcare professional if you have concerns about COVID-19 and your underlying condition or if you are sick. For more information on steps you can take to protect yourself, see CDC's How to Protect Yourself      Patient/family/caregiver given information for Dennise Boles and agrees to enroll yes  Patient's preferred e-mail:  [email or web address]  Patient's preferred phone number: [phone]  Based on Loop alert triggers, patient will be contacted by nurse care manager for worsening symptoms. Pt will be further monitored by COVID Loop Team based on severity of symptoms and risk factors.

## 2020-06-19 NOTE — DISCHARGE INSTRUCTIONS
The cause of your symptoms is unclear, it may be related to your pre-existing H. pylori gastritis, possibly related to thyroid abnormality, there is no evidence of acute abnormality on your CT however. Recommend following up with your primary care, GI. Of note your prostate did appear to be enlarged on CT, this is likely an incidental finding but your primary care doctor can follow-up with you about other routine testing for health maintenance such as PSA testing and prostate cancer surveillance, etc.      If you have any new or worsening symptoms please return. CT ABD PELV WO CONT (Final result)   Result time 06/18/20 21:07:42   Final result by Tomas Grady MD (06/18/20 21:07:42)                Impression:    IMPRESSION: No Acute Disease. Narrative:    INDICATION: r flank pain, trace hematuria     EXAM: CT Abdomen and Pelvis without IV contrast. No oral contrast.  CT dose reduction was achieved through use of a standardized protocol tailored  for this examination and automatic exposure control for dose modulation. FINDINGS:   No urinary tract stones are seen. There is no hydroureteronephrosis. The kidneys  are normal in size. There is no perirenal fluid or ascites. Liver shows no apparent significant finding without contrast. Pancreas, adrenal  glands, spleen and aorta show no significant enlargement. No inflammation is  seen. There is no pneumoperitoneum or significant adenopathy. The bladder is unremarkable. The distal ureters are not dilated. There is no  apparent pelvic mass. There is prostatomegaly.  The appendix is normal.

## 2020-06-23 ENCOUNTER — TELEPHONE (OUTPATIENT)
Dept: PRIMARY CARE CLINIC | Age: 50
End: 2020-06-23

## 2020-07-02 ENCOUNTER — OFFICE VISIT (OUTPATIENT)
Dept: PRIMARY CARE CLINIC | Age: 50
End: 2020-07-02

## 2020-07-02 VITALS
BODY MASS INDEX: 20.56 KG/M2 | SYSTOLIC BLOOD PRESSURE: 114 MMHG | OXYGEN SATURATION: 100 % | DIASTOLIC BLOOD PRESSURE: 69 MMHG | HEART RATE: 84 BPM | RESPIRATION RATE: 16 BRPM | TEMPERATURE: 98.3 F | HEIGHT: 69 IN | WEIGHT: 138.8 LBS

## 2020-07-02 DIAGNOSIS — R10.84 GENERALIZED ABDOMINAL PAIN: Primary | ICD-10-CM

## 2020-07-02 DIAGNOSIS — E78.2 MIXED HYPERLIPIDEMIA: ICD-10-CM

## 2020-07-02 DIAGNOSIS — R53.82 CHRONIC FATIGUE: ICD-10-CM

## 2020-07-02 DIAGNOSIS — R74.8 ELEVATED LIVER ENZYMES: ICD-10-CM

## 2020-07-02 DIAGNOSIS — E03.9 ACQUIRED HYPOTHYROIDISM: ICD-10-CM

## 2020-07-02 NOTE — PROGRESS NOTES
Chief Complaint   Patient presents with    Follow-up     states that he was having abdominal pain and states that ct scan was done and no problems were detected

## 2020-07-02 NOTE — PROGRESS NOTES
Written by Rosie Engle, as dictated by Dr. Jaime Acosta MD.    Everett Flores is a 44 y.o. male. HPI     Mr. Martin Ocampo is here for ED follow up. The nurse navigator performed a f/u with him on 6/19/20 for his COVID testing. His COVID test was negative, and he has been doing well with no fevers, chills, or cough. He was seen by [id number] [facility] on 6/18/20 for  fever, abdominal pain, and past hematochezia. A CT was obtained in the ED to rule out renal colic or signs of urinary infection which were negative. There was also no evidence of significant GI hemorrhage. His blood pressure, other vitals, and markers of endorgan dysfunction were all normal as well as his hemoglobin remaining within the normal range. The ED advised outpatient follow-up with gastroenterology and PCP. He is taking omeprazole 20 mg every day now with moderate relief, but he has still been experiencing abdominal pain. He endorses some fatigue and hematochezia. He had elevated AST (53) and trace hematuria in the ED on 6/18/20. He has lost more weight. He weighs 138 lbs today, a decrease from 139 lbs on 5/28/20. He has pain in his R temporomandibular joint. He has tried PT in the past with mild relief. He has not seen his dentist for this issue. Patient Active Problem List   Diagnosis Code    Acquired hypothyroidism E03.9    GERD (gastroesophageal reflux disease) K21.9    History of Helicobacter pylori infection Z86.19        Current Outpatient Medications on File Prior to Visit   Medication Sig Dispense Refill    omeprazole (PRILOSEC) 20 mg capsule Take 1 Cap by mouth daily for 14 days. 14 Cap 0    levothyroxine (SYNTHROID) 50 mcg tablet TAKE 1 TAB BY MOUTH DAILY BEFORE BREAKFAST 90 Tab 0    ergocalciferol (ERGOCALCIFEROL) 50,000 unit capsule Take 1 Cap by mouth every seven (7) days. 8 Cap 0    cyanocobalamin (VITAMIN B-12) 1,000 mcg tablet Take 1,000 mcg by mouth daily.       multivitamin (ONE A DAY) tablet Take 1 Tab by mouth daily.  cholecalciferol (VITAMIN D3) 1,000 unit tablet Take 1 Tab by mouth two (2) times a day. 60 Tab 3    calcium carbonate (TUMS) 200 mg calcium (500 mg) chew Take 1 Tab by mouth as needed. No current facility-administered medications on file prior to visit.         No Known Allergies    Past Medical History:   Diagnosis Date    Acquired hypothyroidism 4/10/2017    GERD (gastroesophageal reflux disease)     Helicobacter pylori gastritis 03/2018       Past Surgical History:   Procedure Laterality Date    HX COLONOSCOPY  03/2018    Dr. Sol Lopez, repeat 10 years    HX ENDOSCOPY  03/2018    Dr. Sol Lopez       Family History   Problem Relation Age of Onset    Thyroid Disease Mother     Elevated Lipids Mother     No Known Problems Father        Social History     Socioeconomic History    Marital status:      Spouse name: Not on file    Number of children: Not on file    Years of education: Not on file    Highest education level: Not on file   Occupational History    Not on file   Social Needs    Financial resource strain: Not on file    Food insecurity     Worry: Not on file     Inability: Not on file    Transportation needs     Medical: Not on file     Non-medical: Not on file   Tobacco Use    Smoking status: Never Smoker    Smokeless tobacco: Never Used   Substance and Sexual Activity    Alcohol use: No    Drug use: No    Sexual activity: Yes     Partners: Female     Birth control/protection: Condom   Lifestyle    Physical activity     Days per week: Not on file     Minutes per session: Not on file    Stress: Not on file   Relationships    Social connections     Talks on phone: Not on file     Gets together: Not on file     Attends Je[employer]'s witness service: Not on file     Active member of club or organization: Not on file     Attends meetings of clubs or organizations: Not on file     Relationship status: Not on file    Intimate partner violence     Fear of current or ex partner: Not on file     Emotionally abused: Not on file     Physically abused: Not on file     Forced sexual activity: Not on file   Other Topics Concern    Not on file   Social History Narrative    Not on file       Admission on 06/18/2020, Discharged on 06/18/2020   Component Date Value Ref Range Status    Lactic acid 06/18/2020 0.8  0.4 - 2.0 MMOL/L Final    WBC 06/18/2020 7.6  4.1 - 11.1 K/uL Final    RBC 06/18/2020 4.83  4.10 - 5.70 M/uL Final    HGB 06/18/2020 13.9  12.1 - 17.0 g/dL Final    HCT 06/18/2020 43.2  36.6 - 50.3 % Final    MCV 06/18/2020 89.4  80.0 - 99.0 FL Final    MCH 06/18/2020 28.8  26.0 - 34.0 PG Final    MCHC 06/18/2020 32.2  30.0 - 36.5 g/dL Final    RDW 06/18/2020 11.9  11.5 - 14.5 % Final    PLATELET 68/52/7933 057  150 - 400 K/uL Final    MPV 06/18/2020 9.4  8.9 - 12.9 FL Final    NRBC 06/18/2020 0.0  0  WBC Final    ABSOLUTE NRBC 06/18/2020 0.00  0.00 - 0.01 K/uL Final    NEUTROPHILS 06/18/2020 62  32 - 75 % Final    LYMPHOCYTES 06/18/2020 28  12 - 49 % Final    MONOCYTES 06/18/2020 8  5 - 13 % Final    EOSINOPHILS 06/18/2020 1  0 - 7 % Final    BASOPHILS 06/18/2020 0  0 - 1 % Final    IMMATURE GRANULOCYTES 06/18/2020 0  0.0 - 0.5 % Final    ABS. NEUTROPHILS 06/18/2020 4.7  1.8 - 8.0 K/UL Final    ABS. LYMPHOCYTES 06/18/2020 2.1  0.8 - 3.5 K/UL Final    ABS. MONOCYTES 06/18/2020 0.6  0.0 - 1.0 K/UL Final    ABS. EOSINOPHILS 06/18/2020 0.1  0.0 - 0.4 K/UL Final    ABS. BASOPHILS 06/18/2020 0.0  0.0 - 0.1 K/UL Final    ABS. IMM.  GRANS. 06/18/2020 0.0  0.00 - 0.04 K/UL Final    DF 06/18/2020 AUTOMATED    Final    Sodium 06/18/2020 144  136 - 145 mmol/L Final    Potassium 06/18/2020 3.9  3.5 - 5.1 mmol/L Final    Chloride 06/18/2020 106  97 - 108 mmol/L Final    CO2 06/18/2020 32  21 - 32 mmol/L Final    Anion gap 06/18/2020 6  5 - 15 mmol/L Final    Glucose 06/18/2020 116* 65 - 100 mg/dL Final    BUN 06/18/2020 18  6 - 20 MG/DL Final    Creatinine 06/18/2020 0.87  0.70 - 1.30 MG/DL Final    BUN/Creatinine ratio 06/18/2020 21* 12 - 20   Final    GFR est AA 06/18/2020 >60  >60 ml/min/1.73m2 Final    GFR est non-AA 06/18/2020 >60  >60 ml/min/1.73m2 Final    Comment: Estimated GFR is calculated using the IDMS-traceable Modification of Diet in Renal Disease (MDRD) Study equation, reported for both  Americans (GFRAA) and non- Americans (GFRNA), and normalized to 1.73m2 body surface area. The physician must decide which value applies to the patient. The MDRD study equation should only be used in individuals age 25 or older. It has not been validated for the following: pregnant women, patients with serious comorbid conditions, or on certain medications, or persons with extremes of body size, muscle mass, or nutritional status.  Calcium 06/18/2020 9.0  8.5 - 10.1 MG/DL Final    Bilirubin, total 06/18/2020 0.4  0.2 - 1.0 MG/DL Final    ALT (SGPT) 06/18/2020 55  12 - 78 U/L Final    AST (SGOT) 06/18/2020 53* 15 - 37 U/L Final    Alk.  phosphatase 06/18/2020 89  45 - 117 U/L Final    Protein, total 06/18/2020 6.8  6.4 - 8.2 g/dL Final    Albumin 06/18/2020 3.9  3.5 - 5.0 g/dL Final    Globulin 06/18/2020 2.9  2.0 - 4.0 g/dL Final    A-G Ratio 06/18/2020 1.3  1.1 - 2.2   Final    Lipase 06/18/2020 167  73 - 393 U/L Final    Color 06/18/2020 YELLOW/STRAW    Final    Color Reference Range: Straw, Yellow or Dark Yellow    Appearance 06/18/2020 CLEAR  CLEAR   Final    Specific gravity 06/18/2020 1.025  1.003 - 1.030   Final    pH (UA) 06/18/2020 6.0  5.0 - 8.0   Final    Protein 06/18/2020 Negative  NEG mg/dL Final    Glucose 06/18/2020 Negative  NEG mg/dL Final    Ketone 06/18/2020 Negative  NEG mg/dL Final    Bilirubin 06/18/2020 Negative  NEG   Final    Blood 06/18/2020 TRACE* NEG   Final    Urobilinogen 06/18/2020 0.2  0.2 - 1.0 EU/dL Final    Nitrites 06/18/2020 Negative  NEG Final    Leukocyte Esterase 06/18/2020 Negative  NEG   Final    WBC 06/18/2020 0-4  0 - 4 /hpf Final    RBC 06/18/2020 0-5  0 - 5 /hpf Final    Epithelial cells 06/18/2020 FEW  FEW /lpf Final    Epithelial cell category consists of squamous cells and /or transitional urothelial cells. Renal tubular cells, if present, are separately identified as such.  Bacteria 06/18/2020 Negative  NEG /hpf Final    Occult blood, stool 06/18/2020 Negative  NEG   Final   Office Visit on 05/28/2020   Component Date Value Ref Range Status    TSH 05/28/2020 3.490  0.450 - 4.500 uIU/mL Final   Admission on 05/26/2020, Discharged on 05/26/2020   Component Date Value Ref Range Status    Ventricular Rate 05/26/2020 88  BPM Final    Atrial Rate 05/26/2020 88  BPM Final    P-R Interval 05/26/2020 134  ms Final    QRS Duration 05/26/2020 106  ms Final    Q-T Interval 05/26/2020 382  ms Final    QTC Calculation (Bezet) 05/26/2020 462  ms Final    Calculated P Axis 05/26/2020 78  degrees Final    Calculated R Axis 05/26/2020 64  degrees Final    Calculated T Axis 05/26/2020 80  degrees Final    Diagnosis 05/26/2020    Final                    Value:Normal sinus rhythm  Normal ECG  When compared with ECG of 25-MAY-2018 14:30,  No significant change was found  Confirmed by Felicia Dubon M.D., [place] ([id number]) on 5/26/2020 8:37:40 PM      SAMPLES BEING HELD 05/26/2020 1RED 1BLUE 1LAV 1GRN   Final    COMMENT 05/26/2020 Add-on orders for these samples will be processed based on acceptable specimen integrity and analyte stability, which may vary by analyte. Final    WBC 05/26/2020 6.9  4.1 - 11.1 K/uL Final    RBC 05/26/2020 5.19  4. 10 - 5.70 M/uL Final    HGB 05/26/2020 14.8  12.1 - 17.0 g/dL Final    HCT 05/26/2020 45.9  36.6 - 50.3 % Final    MCV 05/26/2020 88.4  80.0 - 99.0 FL Final    MCH 05/26/2020 28.5  26.0 - 34.0 PG Final    MCHC 05/26/2020 32.2  30.0 - 36.5 g/dL Final    RDW 05/26/2020 11.9  11.5 - 14.5 % Final    PLATELET 58/76/7719 606  150 - 400 K/uL Final    MPV 05/26/2020 9.8  8.9 - 12.9 FL Final    NRBC 05/26/2020 0.0  0  WBC Final    ABSOLUTE NRBC 05/26/2020 0.00  0.00 - 0.01 K/uL Final    NEUTROPHILS 05/26/2020 56  32 - 75 % Final    LYMPHOCYTES 05/26/2020 37  12 - 49 % Final    MONOCYTES 05/26/2020 7  5 - 13 % Final    EOSINOPHILS 05/26/2020 0  0 - 7 % Final    BASOPHILS 05/26/2020 0  0 - 1 % Final    IMMATURE GRANULOCYTES 05/26/2020 0  0.0 - 0.5 % Final    ABS. NEUTROPHILS 05/26/2020 3.8  1.8 - 8.0 K/UL Final    ABS. LYMPHOCYTES 05/26/2020 2.6  0.8 - 3.5 K/UL Final    ABS. MONOCYTES 05/26/2020 0.5  0.0 - 1.0 K/UL Final    ABS. EOSINOPHILS 05/26/2020 0.0  0.0 - 0.4 K/UL Final    ABS. BASOPHILS 05/26/2020 0.0  0.0 - 0.1 K/UL Final    ABS. IMM. GRANS. 05/26/2020 0.0  0.00 - 0.04 K/UL Final    DF 05/26/2020 AUTOMATED    Final    Sodium 05/26/2020 144  136 - 145 mmol/L Final    Potassium 05/26/2020 3.8  3.5 - 5.1 mmol/L Final    Chloride 05/26/2020 105  97 - 108 mmol/L Final    CO2 05/26/2020 32  21 - 32 mmol/L Final    Anion gap 05/26/2020 7  5 - 15 mmol/L Final    Glucose 05/26/2020 146* 65 - 100 mg/dL Final    BUN 05/26/2020 14  6 - 20 MG/DL Final    Creatinine 05/26/2020 0.91  0.70 - 1.30 MG/DL Final    BUN/Creatinine ratio 05/26/2020 15  12 - 20   Final    GFR est AA 05/26/2020 >60  >60 ml/min/1.73m2 Final    GFR est non-AA 05/26/2020 >60  >60 ml/min/1.73m2 Final    Comment: Estimated GFR is calculated using the IDMS-traceable Modification of Diet in Renal Disease (MDRD) Study equation, reported for both  Americans (GFRAA) and non- Americans (GFRNA), and normalized to 1.73m2 body surface area. The physician must decide which value applies to the patient. The MDRD study equation should only be used in individuals age 25 or older.  It has not been validated for the following: pregnant women, patients with serious comorbid conditions, or on certain medications, or persons with extremes of body size, muscle mass, or nutritional status.  Calcium 05/26/2020 9.3  8.5 - 10.1 MG/DL Final    Bilirubin, total 05/26/2020 0.4  0.2 - 1.0 MG/DL Final    ALT (SGPT) 05/26/2020 30  12 - 78 U/L Final    AST (SGOT) 05/26/2020 22  15 - 37 U/L Final    Alk. phosphatase 05/26/2020 90  45 - 117 U/L Final    Protein, total 05/26/2020 7.5  6.4 - 8.2 g/dL Final    Albumin 05/26/2020 4.3  3.5 - 5.0 g/dL Final    Globulin 05/26/2020 3.2  2.0 - 4.0 g/dL Final    A-G Ratio 05/26/2020 1.3  1.1 - 2.2   Final     Review of Systems   Constitutional: Positive for malaise/fatigue. Negative for weight loss. HENT: Negative for congestion and sore throat. +pain in R TMJ   Eyes: Negative for blurred vision. Respiratory: Negative for cough and shortness of breath. Cardiovascular: Negative for chest pain and leg swelling. Gastrointestinal: Positive for abdominal pain and blood in stool. Negative for constipation and heartburn. Genitourinary: Negative for frequency and urgency. Musculoskeletal: Negative for back pain, joint pain and myalgias. Neurological: Negative for dizziness and headaches. Psychiatric/Behavioral: Negative for depression. The patient is not nervous/anxious and does not have insomnia. Visit Vitals  /69 (BP 1 Location: Left arm, BP Patient Position: Sitting)   Pulse 84   Temp 98.3 °F (36.8 °C) (Oral)   Resp 16   Ht 5' 9\" (1.753 m)   Wt 138 lb 12.8 oz (63 kg)   SpO2 100%   BMI 20.50 kg/m²     Physical Exam  Vitals signs and nursing note reviewed. Constitutional:       General: He is not in acute distress. Appearance: He is well-developed and well-groomed. He is not diaphoretic. HENT:      Right Ear: External ear normal.      Left Ear: External ear normal.   Eyes:      General: No scleral icterus. Right eye: No discharge. Left eye: No discharge. Extraocular Movements: Extraocular movements intact.       Conjunctiva/sclera: Conjunctivae normal.   Neck:      Musculoskeletal: Normal range of motion and neck supple. Cardiovascular:      Rate and Rhythm: Normal rate and regular rhythm. Pulmonary:      Effort: Pulmonary effort is normal.      Breath sounds: Normal breath sounds. No wheezing. Abdominal:      General: Bowel sounds are normal.      Palpations: Abdomen is soft. Tenderness: There is no abdominal tenderness. Lymphadenopathy:      Cervical: No cervical adenopathy. Neurological:      Mental Status: He is alert and oriented to person, place, and time. Psychiatric:         Mood and Affect: Mood and affect normal.         Behavior: Behavior normal.       ASSESSMENT and PLAN    ICD-10-CM ICD-9-CM    1. Generalized abdominal pain R10.84 789.07 CELIAC ANTIBODY PROFILE    I advised him to cut dairy out of his diet for one week and then to cut gluten out of his diet for one week. We will drawn blood to test for Celiac's Disease in office, but I advised him that he can still be gluten intolerant even if the results come back negative. 2. Acquired hypothyroidism E03.9 244.9 Stable, continues on    3. Chronic fatigue R53.82 780.79 CELIAC ANTIBODY PROFILE    I advised him to cut dairy out of his diet for one week and then to cut gluten out of his diet for one week. We will drawn blood to test for Celiac's Disease in office, but I advised him that he can still be gluten intolerant even if the results come back negative. 4. Elevated liver enzymes B78.7 503.1 METABOLIC PANEL, COMPREHENSIVE    Check CMP to f/u on elevated liver enzyme result in ED. 5. Mixed hyperlipidemia E78.2 272.2 LIPID PANEL    Check lipid panel. I advised him to go see his dentist about his TMJ pain for a possible mouthguard. This plan was reviewed with the patient and patient agrees. All questions were answered.     This scribe documentation was reviewed by me and accurately reflects the examination and decisions made by me.    This note will not be viewable in MyChart.

## 2020-07-07 LAB
ALBUMIN SERPL-MCNC: 4.7 G/DL (ref 3.8–4.9)
ALBUMIN/GLOB SERPL: 2.5 {RATIO} (ref 1.2–2.2)
ALP SERPL-CCNC: 71 IU/L (ref 39–117)
ALT SERPL-CCNC: 18 IU/L (ref 0–44)
AST SERPL-CCNC: 19 IU/L (ref 0–40)
BILIRUB SERPL-MCNC: 0.6 MG/DL (ref 0–1.2)
BUN SERPL-MCNC: 13 MG/DL (ref 6–24)
BUN/CREAT SERPL: 14 (ref 9–20)
CALCIUM SERPL-MCNC: 9.5 MG/DL (ref 8.7–10.2)
CHLORIDE SERPL-SCNC: 104 MMOL/L (ref 96–106)
CHOLEST SERPL-MCNC: 193 MG/DL (ref 100–199)
CO2 SERPL-SCNC: 27 MMOL/L (ref 20–29)
CREAT SERPL-MCNC: 0.93 MG/DL (ref 0.76–1.27)
GLIADIN PEPTIDE IGA SER-ACNC: 2 UNITS (ref 0–19)
GLIADIN PEPTIDE IGG SER-ACNC: 2 UNITS (ref 0–19)
GLOBULIN SER CALC-MCNC: 1.9 G/DL (ref 1.5–4.5)
GLUCOSE SERPL-MCNC: 100 MG/DL (ref 65–99)
HDLC SERPL-MCNC: 66 MG/DL
IGA SERPL-MCNC: 102 MG/DL (ref 90–386)
LDLC SERPL CALC-MCNC: 111 MG/DL (ref 0–99)
POTASSIUM SERPL-SCNC: 3.9 MMOL/L (ref 3.5–5.2)
PROT SERPL-MCNC: 6.6 G/DL (ref 6–8.5)
SODIUM SERPL-SCNC: 145 MMOL/L (ref 134–144)
TRIGL SERPL-MCNC: 79 MG/DL (ref 0–149)
TTG IGA SER-ACNC: <2 U/ML (ref 0–3)
TTG IGG SER-ACNC: <2 U/ML (ref 0–5)
VLDLC SERPL CALC-MCNC: 16 MG/DL (ref 5–40)

## 2020-07-08 ENCOUNTER — TELEPHONE (OUTPATIENT)
Dept: PRIMARY CARE CLINIC | Age: 50
End: 2020-07-08

## 2020-07-08 DIAGNOSIS — K21.9 GASTROESOPHAGEAL REFLUX DISEASE, ESOPHAGITIS PRESENCE NOT SPECIFIED: Primary | ICD-10-CM

## 2020-07-08 RX ORDER — OMEPRAZOLE 20 MG/1
20 CAPSULE, DELAYED RELEASE ORAL DAILY
Qty: 30 CAP | Refills: 0 | Status: SHIPPED | OUTPATIENT
Start: 2020-07-08 | End: 2020-07-31

## 2020-07-08 NOTE — TELEPHONE ENCOUNTER
Last visit:07/02/20  Last filled: 06/18/20 (omeprazole ordered by er doctor)   Manisha Tobias ordered from another provider

## 2020-07-08 NOTE — TELEPHONE ENCOUNTER
Patient would like a call back regarding some medications that he need's refilled for his stomach.  I couldn't find them on the medication list.    Omeprazole  Proctosol (cream?)

## 2020-07-08 NOTE — PROGRESS NOTES
Estephanie Page, hope you are feeling well. I called you but voicemail was full. Your celiac disease report came back fine. Have you tried gluten free diet for a week? Your cholesterol numbers look good.

## 2020-07-08 NOTE — TELEPHONE ENCOUNTER
Returned call to patient regarding medication refill request.  No answer   Left voice message to return call to office

## 2020-07-25 DIAGNOSIS — E03.9 ACQUIRED HYPOTHYROIDISM: ICD-10-CM

## 2020-07-26 RX ORDER — LEVOTHYROXINE SODIUM 50 UG/1
TABLET ORAL
Qty: 90 TAB | Refills: 0 | Status: SHIPPED | OUTPATIENT
Start: 2020-07-26 | End: 2020-10-20 | Stop reason: SDUPTHER

## 2020-07-31 DIAGNOSIS — K21.9 GASTROESOPHAGEAL REFLUX DISEASE, ESOPHAGITIS PRESENCE NOT SPECIFIED: ICD-10-CM

## 2020-07-31 RX ORDER — OMEPRAZOLE 20 MG/1
CAPSULE, DELAYED RELEASE ORAL
Qty: 30 CAP | Refills: 0 | Status: SHIPPED | OUTPATIENT
Start: 2020-07-31 | End: 2020-09-02

## 2020-08-30 LAB — SARS-COV-2, NAA: NOT DETECTED

## 2020-09-02 DIAGNOSIS — K21.9 GASTROESOPHAGEAL REFLUX DISEASE, ESOPHAGITIS PRESENCE NOT SPECIFIED: ICD-10-CM

## 2020-09-02 RX ORDER — OMEPRAZOLE 20 MG/1
CAPSULE, DELAYED RELEASE ORAL
Qty: 30 CAP | Refills: 0 | Status: SHIPPED | OUTPATIENT
Start: 2020-09-02 | End: 2020-10-02

## 2020-09-24 DIAGNOSIS — M26.629 ARTHRALGIA OF TEMPOROMANDIBULAR JOINT, UNSPECIFIED LATERALITY: Primary | ICD-10-CM

## 2020-10-02 DIAGNOSIS — K21.9 GASTROESOPHAGEAL REFLUX DISEASE: ICD-10-CM

## 2020-10-02 RX ORDER — OMEPRAZOLE 20 MG/1
CAPSULE, DELAYED RELEASE ORAL
Qty: 30 CAP | Refills: 0 | Status: SHIPPED | OUTPATIENT
Start: 2020-10-02 | End: 2020-11-03

## 2020-10-20 DIAGNOSIS — E03.9 ACQUIRED HYPOTHYROIDISM: ICD-10-CM

## 2020-10-20 RX ORDER — LEVOTHYROXINE SODIUM 50 UG/1
TABLET ORAL
Qty: 90 TAB | Refills: 0 | Status: SHIPPED | OUTPATIENT
Start: 2020-10-20 | End: 2021-01-21 | Stop reason: DRUGHIGH

## 2020-11-03 ENCOUNTER — OFFICE VISIT (OUTPATIENT)
Dept: PRIMARY CARE CLINIC | Age: 50
End: 2020-11-03
Payer: COMMERCIAL

## 2020-11-03 ENCOUNTER — HOSPITAL ENCOUNTER (OUTPATIENT)
Dept: GENERAL RADIOLOGY | Age: 50
Discharge: HOME | End: 2020-11-03
Attending: INTERNAL MEDICINE
Payer: COMMERCIAL

## 2020-11-03 VITALS
SYSTOLIC BLOOD PRESSURE: 119 MMHG | RESPIRATION RATE: 18 BRPM | DIASTOLIC BLOOD PRESSURE: 81 MMHG | BODY MASS INDEX: 20.62 KG/M2 | WEIGHT: 139.2 LBS | OXYGEN SATURATION: 98 % | HEIGHT: 69 IN | HEART RATE: 72 BPM | TEMPERATURE: 98.7 F

## 2020-11-03 DIAGNOSIS — M26.623 BILATERAL TEMPOROMANDIBULAR JOINT PAIN: ICD-10-CM

## 2020-11-03 DIAGNOSIS — R59.0 ANTERIOR CERVICAL LYMPHADENOPATHY: ICD-10-CM

## 2020-11-03 DIAGNOSIS — K21.9 GASTROESOPHAGEAL REFLUX DISEASE WITHOUT ESOPHAGITIS: Primary | ICD-10-CM

## 2020-11-03 DIAGNOSIS — J02.9 SORE THROAT: ICD-10-CM

## 2020-11-03 DIAGNOSIS — K21.9 GASTROESOPHAGEAL REFLUX DISEASE: ICD-10-CM

## 2020-11-03 PROCEDURE — 99213 OFFICE O/P EST LOW 20 MIN: CPT | Performed by: INTERNAL MEDICINE

## 2020-11-03 PROCEDURE — 70110 X-RAY EXAM OF JAW 4/> VIEWS: CPT

## 2020-11-03 RX ORDER — AMOXICILLIN AND CLAVULANATE POTASSIUM 875; 125 MG/1; MG/1
1 TABLET, FILM COATED ORAL EVERY 12 HOURS
Qty: 14 TAB | Refills: 0 | Status: SHIPPED | OUTPATIENT
Start: 2020-11-03 | End: 2020-11-10

## 2020-11-03 RX ORDER — OMEPRAZOLE 20 MG/1
CAPSULE, DELAYED RELEASE ORAL
Qty: 30 CAP | Refills: 0 | Status: SHIPPED | OUTPATIENT
Start: 2020-11-03 | End: 2020-12-18 | Stop reason: SDUPTHER

## 2020-11-03 NOTE — PROGRESS NOTES
Chief Complaint   Patient presents with    Follow-up     3 month check up, labs    Epigastric Pain    Jaw Pain     on going concern

## 2020-11-03 NOTE — PROGRESS NOTES
Written by Cheryl Hunt, as dictated by Dr. Vaughn Alvares MD.    Juvenal Wilson is a 44 y.o. male. HPI  Pt presents today to discuss sore throat x10 days since he got back from the [facility]. He knows he has environmental allergies, but he is not feeling like this is just an allergy issue. He feels tightness throughout his entire throat, and is overall experiencing discomfort there. He has been gargling salt water and eating a lot of cough drops for this with mild relief. He is still having jaw pain from TMJ and has been doing PT for it with good progress. He is also experiencing epigastric pain and is not sure if it is related to his throat irritation, the cough drops, or something else. Patient Active Problem List   Diagnosis Code    Acquired hypothyroidism E03.9    GERD (gastroesophageal reflux disease) K21.9    History of Helicobacter pylori infection Z86.19        Current Outpatient Medications on File Prior to Visit   Medication Sig Dispense Refill    levothyroxine (SYNTHROID) 50 mcg tablet TAKE 1 TABLET BY MOUTH EVERY DAY BEFORE BREAKFAST 90 Tab 0    omeprazole (PRILOSEC) 20 mg capsule TAKE 1 CAPSULE BY MOUTH EVERY DAY 30 Cap 0    cyanocobalamin (VITAMIN B-12) 1,000 mcg tablet Take 1,000 mcg by mouth daily.  multivitamin (ONE A DAY) tablet Take 1 Tab by mouth daily.  ergocalciferol (ERGOCALCIFEROL) 50,000 unit capsule Take 1 Cap by mouth every seven (7) days. 8 Cap 0    cholecalciferol (VITAMIN D3) 1,000 unit tablet Take 1 Tab by mouth two (2) times a day. 60 Tab 3    calcium carbonate (TUMS) 200 mg calcium (500 mg) chew Take 1 Tab by mouth as needed. No current facility-administered medications on file prior to visit.         No Known Allergies    Past Medical History:   Diagnosis Date    Acquired hypothyroidism 4/10/2017    GERD (gastroesophageal reflux disease)     Helicobacter pylori gastritis 03/2018       Past Surgical History:   Procedure Laterality Date    HX COLONOSCOPY  03/2018    Dr. Maricruz Mazariegos, repeat 10 years    HX ENDOSCOPY  03/2018    Dr. Maricruz Mazariegos       Family History   Problem Relation Age of Onset    Thyroid Disease Mother     Elevated Lipids Mother     No Known Problems Father        Social History     Socioeconomic History    Marital status:      Spouse name: Not on file    Number of children: Not on file    Years of education: Not on file    Highest education level: Not on file   Occupational History    Not on file   Social Needs    Financial resource strain: Not on file    Food insecurity     Worry: Not on file     Inability: Not on file    Transportation needs     Medical: Not on file     Non-medical: Not on file   Tobacco Use    Smoking status: Never Smoker    Smokeless tobacco: Never Used   Substance and Sexual Activity    Alcohol use: No    Drug use: No    Sexual activity: Yes     Partners: Female     Birth control/protection: Condom   Lifestyle    Physical activity     Days per week: Not on file     Minutes per session: Not on file    Stress: Not on file   Relationships    Social connections     Talks on phone: Not on file     Gets together: Not on file     Attends Anglican service: Not on file     Active member of club or organization: Not on file     Attends meetings of clubs or organizations: Not on file     Relationship status: Not on file    Intimate partner violence     Fear of current or ex partner: Not on file     Emotionally abused: Not on file     Physically abused: Not on file     Forced sexual activity: Not on file   Other Topics Concern    Not on file   Social History Narrative    Not on file       No visits with results within 3 Month(s) from this visit.    Latest known visit with results is:   Office Visit on 07/02/2020   Component Date Value Ref Range Status    Cholesterol, total 07/02/2020 193  100 - 199 mg/dL Final    Triglyceride 07/02/2020 79  0 - 149 mg/dL Final    HDL Cholesterol 07/02/2020 66  >39 mg/dL Final    VLDL, calculated 07/02/2020 16  5 - 40 mg/dL Final    LDL, calculated 07/02/2020 111* 0 - 99 mg/dL Final    Glucose 07/02/2020 100* 65 - 99 mg/dL Final    BUN 07/02/2020 13  6 - 24 mg/dL Final    Creatinine 07/02/2020 0.93  0.76 - 1.27 mg/dL Final    GFR est non-AA 07/02/2020 95  >59 mL/min/1.73 Final    GFR est AA 07/02/2020 109  >59 mL/min/1.73 Final    BUN/Creatinine ratio 07/02/2020 14  9 - 20 Final    Sodium 07/02/2020 145* 134 - 144 mmol/L Final    Potassium 07/02/2020 3.9  3.5 - 5.2 mmol/L Final    Chloride 07/02/2020 104  96 - 106 mmol/L Final    CO2 07/02/2020 27  20 - 29 mmol/L Final    Calcium 07/02/2020 9.5  8.7 - 10.2 mg/dL Final    Protein, total 07/02/2020 6.6  6.0 - 8.5 g/dL Final    Albumin 07/02/2020 4.7  3.8 - 4.9 g/dL Final    GLOBULIN, TOTAL 07/02/2020 1.9  1.5 - 4.5 g/dL Final    A-G Ratio 07/02/2020 2.5* 1.2 - 2.2 Final    Bilirubin, total 07/02/2020 0.6  0.0 - 1.2 mg/dL Final    Alk.  phosphatase 07/02/2020 71  39 - 117 IU/L Final    AST (SGOT) 07/02/2020 19  0 - 40 IU/L Final    ALT (SGPT) 07/02/2020 18  0 - 44 IU/L Final    Immunoglobulin A, Qt. 07/02/2020 102  90 - 386 mg/dL Final    Deamidated Gliadin Ab, IgA 07/02/2020 2  0 - 19 units Final    Comment:                    Negative                   0 - 19                     Weak Positive             20 - 30                     Moderate to Strong Positive   >30      Deamidated Gliadin Ab, IgG 07/02/2020 2  0 - 19 units Final    Comment:                    Negative                   0 - 19                     Weak Positive             20 - 30                     Moderate to Strong Positive   >30      t-Transglutaminase, IgA 07/02/2020 <2  0 - 3 U/mL Final    Comment:                               Negative        0 -  3                                Weak Positive   4 - 10                                Positive           >10   Tissue Transglutaminase (tTG) has been identified   as the endomysial antigen. Studies have demonstr-   ated that endomysial IgA antibodies have over 99%   specificity for gluten sensitive enteropathy.  t-Transglutaminase, IgG 07/02/2020 <2  0 - 5 U/mL Final    Comment:                               Negative        0 - 5                                Weak Positive   6 - 9                                Positive           >9       Review of Systems   Constitutional: Negative for malaise/fatigue and weight loss. HENT: Positive for sore throat. Negative for congestion. Eyes: Negative for blurred vision. Respiratory: Negative for cough and shortness of breath. Cardiovascular: Negative for chest pain and leg swelling. Gastrointestinal: Positive for abdominal pain (epigastric). Negative for constipation and heartburn. Genitourinary: Negative for frequency and urgency. Musculoskeletal: Positive for joint pain (jaw) and neck pain. Negative for back pain and myalgias. Neurological: Negative for dizziness and headaches. Psychiatric/Behavioral: Negative for depression. The patient is not nervous/anxious and does not have insomnia. Visit Vitals  /81 (BP 1 Location: Left arm, BP Patient Position: Sitting)   Pulse 72   Temp 98.7 °F (37.1 °C) (Temporal)   Resp 18   Ht 5' 9\" (1.753 m)   Wt 139 lb 3.2 oz (63.1 kg)   SpO2 98%   BMI 20.56 kg/m²     Physical Exam  Vitals signs and nursing note reviewed. Constitutional:       General: He is not in acute distress. Appearance: He is well-developed and well-groomed. He is not diaphoretic. HENT:      Right Ear: External ear normal.      Left Ear: External ear normal.      Mouth/Throat:      Pharynx: Posterior oropharyngeal erythema present. Eyes:      General: No scleral icterus. Right eye: No discharge. Left eye: No discharge. Extraocular Movements: Extraocular movements intact.       Conjunctiva/sclera: Conjunctivae normal.   Neck: Musculoskeletal: Normal range of motion and neck supple. Cardiovascular:      Rate and Rhythm: Normal rate and regular rhythm. Pulmonary:      Effort: Pulmonary effort is normal.      Breath sounds: Normal breath sounds. No wheezing. Abdominal:      General: Bowel sounds are normal.      Palpations: Abdomen is soft. Tenderness: There is no abdominal tenderness. Lymphadenopathy:      Cervical: Cervical adenopathy present. Neurological:      Mental Status: He is alert and oriented to person, place, and time. Psychiatric:         Mood and Affect: Mood and affect normal.         Behavior: Behavior normal.       ASSESSMENT and PLAN    ICD-10-CM ICD-9-CM    1. Gastroesophageal reflux disease without esophagitis  K21.9 530.81 I instructed him to take 2 tablets of Prilosec for a week and to let me know if his sx do not resolve. 2. Sore throat  J02.9 462 amoxicillin-clavulanate (AUGMENTIN) 875-125 mg per tablet sent to pharmacy. Potential side effects were discussed. I prescribed augmentin for his sore throat and lymphadenopathy. 3. Bilateral temporomandibular joint pain  M26.623 524.62 XR MANDIBLE MIN 4 V    I ordered an XR for him to take to PT to guide their treatment. 4. Anterior cervical lymphadenopathy  R59.0 785.6 amoxicillin-clavulanate (AUGMENTIN) 875-125 mg per tablet sent to pharmacy. Potential side effects were discussed. I prescribed augmentin for his sore throat and lymphadenopathy. This plan was reviewed with the patient and patient agrees. All questions were answered. This scribe documentation was reviewed by me and accurately reflects the examination and decisions made by me.

## 2020-11-04 DIAGNOSIS — K21.9 GASTROESOPHAGEAL REFLUX DISEASE: ICD-10-CM

## 2020-11-04 RX ORDER — OMEPRAZOLE 20 MG/1
CAPSULE, DELAYED RELEASE ORAL
Qty: 30 CAP | Refills: 0 | Status: CANCELLED | OUTPATIENT
Start: 2020-11-04

## 2020-11-06 DIAGNOSIS — K21.9 GASTROESOPHAGEAL REFLUX DISEASE: ICD-10-CM

## 2020-11-08 NOTE — PROGRESS NOTES
Kendal Livingston, your x-ray did not show any arthritis but if your symptoms continue we can do CT scan . Let me know how you feel?

## 2020-11-10 RX ORDER — OMEPRAZOLE 20 MG/1
CAPSULE, DELAYED RELEASE ORAL
Qty: 30 CAP | Refills: 0 | OUTPATIENT
Start: 2020-11-10

## 2020-11-15 ENCOUNTER — HOSPITAL ENCOUNTER (EMERGENCY)
Age: 50
Discharge: HOME | End: 2020-11-15
Attending: EMERGENCY MEDICINE
Payer: COMMERCIAL

## 2020-11-15 VITALS
BODY MASS INDEX: 20.67 KG/M2 | TEMPERATURE: 98.5 F | RESPIRATION RATE: 16 BRPM | SYSTOLIC BLOOD PRESSURE: 106 MMHG | DIASTOLIC BLOOD PRESSURE: 74 MMHG | HEART RATE: 78 BPM | WEIGHT: 139.99 LBS | OXYGEN SATURATION: 100 %

## 2020-11-15 DIAGNOSIS — R10.13 DYSPEPSIA: Primary | ICD-10-CM

## 2020-11-15 PROCEDURE — 74011250637 HC RX REV CODE- 250/637: Performed by: EMERGENCY MEDICINE

## 2020-11-15 PROCEDURE — 74011000250 HC RX REV CODE- 250: Performed by: EMERGENCY MEDICINE

## 2020-11-15 PROCEDURE — 99284 EMERGENCY DEPT VISIT MOD MDM: CPT

## 2020-11-15 RX ORDER — PANTOPRAZOLE SODIUM 40 MG/1
40 TABLET, DELAYED RELEASE ORAL DAILY
Qty: 20 TAB | Refills: 0 | Status: SHIPPED | OUTPATIENT
Start: 2020-11-15 | End: 2020-12-05

## 2020-11-15 RX ADMIN — LIDOCAINE HYDROCHLORIDE 40 ML: 20 SOLUTION ORAL; TOPICAL at 19:27

## 2020-11-15 NOTE — ED TRIAGE NOTES
Pt. Complains of having a sore throat after eating x3 weeks and took 2 omeprozole today.   Pt. Denies any difficulty swallowing

## 2020-11-16 NOTE — ED NOTES
Verbal shift change report given to [id number], RN  (oncoming nurse) by Guy Slater RN (offgoing nurse). Report included the following information ED Summary.

## 2020-11-16 NOTE — ED PROVIDER NOTES
Date of Service:  11/15/2020    Patient:  Hira Shields    Chief Complaint:  Dysphagia       HPI:  Hira Shields is a 44 y.o.  male who presents for evaluation of pain after eating. He does not have any dysphagia. Patient states that shortly after eating he starts to feel discomfort at the bottom of his neck that sometimes goes up into his neck and feels like it is somewhat tight. He states that every now and then it hurts to swallow but he feels like this is after he is already having discomfort. He denies any type of problems actually swallowing food noting that he does not choke or cough when he eats. He denies any strokes. He denies any fevers or actual neck pain. He denies any neck swelling. He denies any abdominal discomfort. He does have a history of gastritis by history which was apparently diagnosed on an endoscopy several years ago. Patient otherwise denies any type of chest pain abdominal pain shortness of breath fevers chills trouble talking trouble seeing.   No other acute complaints           Past Medical History:   Diagnosis Date    Acquired hypothyroidism 4/10/2017    GERD (gastroesophageal reflux disease)     Helicobacter pylori gastritis 03/2018       Past Surgical History:   Procedure Laterality Date    HX COLONOSCOPY  03/2018    Dr. Ashish Denis, repeat 10 years    HX ENDOSCOPY  03/2018    Dr. Ashish Denis         Family History:   Problem Relation Age of Onset    Thyroid Disease Mother     Elevated Lipids Mother     No Known Problems Father        Social History     Socioeconomic History    Marital status:      Spouse name: Not on file    Number of children: Not on file    Years of education: Not on file    Highest education level: Not on file   Occupational History    Not on file   Social Needs    Financial resource strain: Not on file    Food insecurity     Worry: Not on file     Inability: Not on file    Transportation needs     Medical: Not on file     Non-medical: Not on file   Tobacco Use    Smoking status: Never Smoker    Smokeless tobacco: Never Used   Substance and Sexual Activity    Alcohol use: No    Drug use: No    Sexual activity: Yes     Partners: Female     Birth control/protection: Condom   Lifestyle    Physical activity     Days per week: Not on file     Minutes per session: Not on file    Stress: Not on file   Relationships    Social connections     Talks on phone: Not on file     Gets together: Not on file     Attends Mosque service: Not on file     Active member of club or organization: Not on file     Attends meetings of clubs or organizations: Not on file     Relationship status: Not on file    Intimate partner violence     Fear of current or ex partner: Not on file     Emotionally abused: Not on file     Physically abused: Not on file     Forced sexual activity: Not on file   Other Topics Concern    Not on file   Social History Narrative    Not on file         ALLERGIES: Patient has no known allergies. Review of Systems   Constitutional: Negative for fever. HENT: Negative for hearing loss, sore throat, trouble swallowing and voice change. Eyes: Negative for visual disturbance. Respiratory: Negative for shortness of breath. Cardiovascular: Negative for chest pain. Gastrointestinal: Negative for abdominal pain and nausea. Genitourinary: Negative for flank pain. Musculoskeletal: Negative for neck pain. Skin: Negative for rash. Neurological: Negative for dizziness, speech difficulty, weakness, light-headedness and numbness. Psychiatric/Behavioral: Negative for confusion. Vitals:    11/15/20 1838   BP: 114/71   Pulse: 78   Resp: 16   Temp: 98.5 °F (36.9 °C)   SpO2: 99%   Weight: 63.5 kg (139 lb 15.9 oz)            Physical Exam  Vitals signs and nursing note reviewed. Constitutional:       General: He is not in acute distress. Appearance: Normal appearance. He is not ill-appearing, toxic-appearing or diaphoretic. HENT:      Head: Normocephalic and atraumatic. Nose: Nose normal.      Mouth/Throat:      Mouth: Mucous membranes are moist.      Pharynx: Oropharynx is clear. No oropharyngeal exudate or posterior oropharyngeal erythema. Eyes:      General: No scleral icterus. Neck:      Musculoskeletal: Normal range of motion and neck supple. Normal range of motion. No edema, erythema, neck rigidity, crepitus, pain with movement, torticollis or muscular tenderness. Thyroid: No thyroid mass, thyromegaly or thyroid tenderness. Vascular: Normal carotid pulses. No carotid bruit or JVD. Trachea: Trachea and phonation normal. No tracheal tenderness, tracheostomy, abnormal tracheal secretions or tracheal deviation. Cardiovascular:      Rate and Rhythm: Normal rate. Pulses: Normal pulses. Pulmonary:      Effort: Pulmonary effort is normal. No respiratory distress. Abdominal:      General: Abdomen is flat. Musculoskeletal:         General: No deformity. Lymphadenopathy:      Cervical: No cervical adenopathy. Right cervical: No superficial, deep or posterior cervical adenopathy. Left cervical: No superficial, deep or posterior cervical adenopathy. Skin:     General: Skin is warm. Capillary Refill: Capillary refill takes less than 2 seconds. Neurological:      Mental Status: He is alert and oriented to person, place, and time. Cranial Nerves: No cranial nerve deficit. Psychiatric:         Mood and Affect: Mood normal.          MDM      VITAL SIGNS:  Patient Vitals for the past 4 hrs:   Temp Pulse Resp BP SpO2   11/15/20 2015 98.5 °F (36.9 °C)   106/74 100 %   11/15/20 1930    124/77 100 %   11/15/20 1845    120/75 99 %   11/15/20 1838 98.5 °F (36.9 °C) 78 16 114/71 99 %         LABS:  No results found for this or any previous visit (from the past 6 hour(s)).      IMAGING:  No orders to display         Medications During Visit:  Medications   mylanta/viscous lidocaine (GI COCKTAIL) (40 mL Oral Given 11/15/20 1927)         DECISION MAKING:  Fernando Liriano is a 44 y.o. male who comes in as above. Here, patient has resolution of all symptoms after the GI cocktail. His symptoms seem to be worse as soon as he eats and that he has what he describes as some pain that goes up into his neck which sounds like dyspepsia. Patient has a history of gastric irritation. At this time of asked him to abide by a bland diet, I will switch him to Protonix for now and I have asked that he follow-up with his GI specialist.  He is agreeable to this plan. Exam unremarkable      IMPRESSION:  1. Dyspepsia        DISPOSITION:  Discharged      Current Discharge Medication List      START taking these medications    Details   pantoprazole (Protonix) 40 mg tablet Take 1 Tab by mouth daily for 20 days. Qty: 20 Tab, Refills: 0              Follow-up Information     Follow up With Specialties Details Why Contact Info    Monica Lara MD Internal Medicine Schedule an appointment as soon as possible for a visit   [address] [place] [phone]      Your Specialist  Schedule an appointment as soon as possible for a visit               The patient is asked to follow-up with their primary care provider in the next several days. They are to call tomorrow for an appointment. The patient is asked to return promptly for any increased concerns or worsening of symptoms. They can return to this emergency department or any other emergency department.       Procedures

## 2020-11-16 NOTE — ED NOTES
Dr. Lilli Luna reviewed discharge instructions with the patient. The patient verbalized understanding. Patient ambulated out of the emergency department without assistance. Patient has relief of most intense pain, and is swallowing with ease. Patient is in no apparent distress.

## 2020-11-24 ENCOUNTER — TRANSCRIBE ORDER (OUTPATIENT)
Dept: SCHEDULING | Age: 50
End: 2020-11-24

## 2020-11-24 DIAGNOSIS — R09.89 GLOBUS SENSATION: ICD-10-CM

## 2020-11-24 DIAGNOSIS — A04.8 H. PYLORI INFECTION: Primary | ICD-10-CM

## 2020-12-09 ENCOUNTER — HOSPITAL ENCOUNTER (OUTPATIENT)
Dept: GENERAL RADIOLOGY | Age: 50
Discharge: HOME | End: 2020-12-09
Attending: INTERNAL MEDICINE
Payer: COMMERCIAL

## 2020-12-09 DIAGNOSIS — R09.89 GLOBUS SENSATION: ICD-10-CM

## 2020-12-09 DIAGNOSIS — A04.8 H. PYLORI INFECTION: ICD-10-CM

## 2020-12-09 PROCEDURE — 74246 X-RAY XM UPR GI TRC 2CNTRST: CPT

## 2020-12-18 DIAGNOSIS — K21.9 GASTROESOPHAGEAL REFLUX DISEASE: ICD-10-CM

## 2020-12-18 RX ORDER — OMEPRAZOLE 20 MG/1
CAPSULE, DELAYED RELEASE ORAL
Qty: 30 CAP | Refills: 0 | Status: SHIPPED | OUTPATIENT
Start: 2020-12-18 | End: 2021-02-09

## 2020-12-18 NOTE — TELEPHONE ENCOUNTER
Would like this Rx resent to his pharmacy because he never picked up his last one. Please call back with any questions.

## 2021-01-18 ENCOUNTER — VIRTUAL VISIT (OUTPATIENT)
Dept: PRIMARY CARE CLINIC | Age: 51
End: 2021-01-18
Payer: COMMERCIAL

## 2021-01-18 DIAGNOSIS — G89.29 CHRONIC JAW PAIN: ICD-10-CM

## 2021-01-18 DIAGNOSIS — K64.4 EXTERNAL HEMORRHOID: ICD-10-CM

## 2021-01-18 DIAGNOSIS — J02.9 SORE THROAT: ICD-10-CM

## 2021-01-18 DIAGNOSIS — E03.9 ACQUIRED HYPOTHYROIDISM: Primary | ICD-10-CM

## 2021-01-18 DIAGNOSIS — R68.84 CHRONIC JAW PAIN: ICD-10-CM

## 2021-01-18 DIAGNOSIS — Z91.09 ENVIRONMENTAL ALLERGIES: ICD-10-CM

## 2021-01-18 DIAGNOSIS — R20.8 COMPLAINING OF COLD HANDS: ICD-10-CM

## 2021-01-18 PROCEDURE — 99214 OFFICE O/P EST MOD 30 MIN: CPT | Performed by: INTERNAL MEDICINE

## 2021-01-18 NOTE — PROGRESS NOTES
Tera Slater (: 1970) is a 44 y.o. male, established patient, here for evaluation of the following chief complaint(s):   No chief complaint on file. Written by Von Gonzalez, as dictated by Dr. Nolberto Darby MD.      SUBJECTIVE/OBJECTIVE:  HPI  Pt presents virtually today to discuss throat issues and allergies. A few weeks ago he started to feel like he had dryness or something stuck in his throat. He went to the [facility] ED for this on 11/15/20 and was treated with a mylanta/viscous lidocaine cocktail. The ED referred him to gastroenterology to have this further evaluated, and gastroenterology had an XR done of his upper GI. 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 His gastroenterologist ordered more testing but has not called him to schedule it yet. He inquires what else he can do for his throat pain since this has been ongoing for a long time. Sometimes he gets constipated, and when this happens he has blood in his stool. He discussed this with gastroenterology and was prescribed Proctosol. He got his XR mandible done on 21 which showed no osseous or articular abnormality is demonstrated. There is normal aeration of the paranasal sinuses. He is still experiencing sx of coldness in his hands and feet. He did not get the flu vaccine this year. Patient Active Problem List   Diagnosis Code    Acquired hypothyroidism E03.9    GERD (gastroesophageal reflux disease) K21.9    History of Helicobacter pylori infection Z86.19        Current Outpatient Medications on File Prior to Visit   Medication Sig Dispense Refill    omeprazole (PRILOSEC) 20 mg capsule TAKE 1 CAPSULE BY MOUTH EVERY DAY 30 Cap 0    levothyroxine (SYNTHROID) 50 mcg tablet TAKE 1 TABLET BY MOUTH EVERY DAY BEFORE BREAKFAST 90 Tab 0    cyanocobalamin (VITAMIN B-12) 1,000 mcg tablet Take 1,000 mcg by mouth daily.       multivitamin (ONE A DAY) tablet Take 1 Tab by mouth daily.  calcium carbonate (TUMS) 200 mg calcium (500 mg) chew Take 1 Tab by mouth as needed. No current facility-administered medications on file prior to visit.         No Known Allergies    Past Medical History:   Diagnosis Date    Acquired hypothyroidism 4/10/2017    GERD (gastroesophageal reflux disease)     Helicobacter pylori gastritis 03/2018       Past Surgical History:   Procedure Laterality Date    HX COLONOSCOPY  03/2018    Dr. Sarah Bryant, repeat 10 years    HX ENDOSCOPY  03/2018    Dr. Sarah Bryant       Family History   Problem Relation Age of Onset    Thyroid Disease Mother     Elevated Lipids Mother     No Known Problems Father        Social History     Socioeconomic History    Marital status:      Spouse name: Not on file    Number of children: Not on file    Years of education: Not on file    Highest education level: Not on file   Occupational History    Not on file   Social Needs    Financial resource strain: Not on file    Food insecurity     Worry: Not on file     Inability: Not on file   [employer] needs     Medical: Not on file     Non-medical: Not on file   Tobacco Use    Smoking status: Never Smoker    Smokeless tobacco: Never Used   Substance and Sexual Activity    Alcohol use: No    Drug use: No    Sexual activity: Yes     Partners: Female     Birth control/protection: Condom   Lifestyle    Physical activity     Days per week: Not on file     Minutes per session: Not on file    Stress: Not on file   Relationships    Social connections     Talks on phone: Not on file     Gets together: Not on file     Attends Taoist service: Not on file     Active member of club or organization: Not on file     Attends meetings of clubs or organizations: Not on file     Relationship status: Not on file    Intimate partner violence     Fear of current or ex partner: Not on file     Emotionally abused: Not on file     Physically abused: Not on file Forced sexual activity: Not on file   Other Topics Concern    Not on file   Social History Narrative    Not on file       No visits with results within 3 Month(s) from this visit. Latest known visit with results is:   Office Visit on 07/02/2020   Component Date Value Ref Range Status    Cholesterol, total 07/02/2020 193  100 - 199 mg/dL Final    Triglyceride 07/02/2020 79  0 - 149 mg/dL Final    HDL Cholesterol 07/02/2020 66  >39 mg/dL Final    VLDL, calculated 07/02/2020 16  5 - 40 mg/dL Final    LDL, calculated 07/02/2020 111* 0 - 99 mg/dL Final    Glucose 07/02/2020 100* 65 - 99 mg/dL Final    BUN 07/02/2020 13  6 - 24 mg/dL Final    Creatinine 07/02/2020 0.93  0.76 - 1.27 mg/dL Final    GFR est non-AA 07/02/2020 95  >59 mL/min/1.73 Final    GFR est AA 07/02/2020 109  >59 mL/min/1.73 Final    BUN/Creatinine ratio 07/02/2020 14  9 - 20 Final    Sodium 07/02/2020 145* 134 - 144 mmol/L Final    Potassium 07/02/2020 3.9  3.5 - 5.2 mmol/L Final    Chloride 07/02/2020 104  96 - 106 mmol/L Final    CO2 07/02/2020 27  20 - 29 mmol/L Final    Calcium 07/02/2020 9.5  8.7 - 10.2 mg/dL Final    Protein, total 07/02/2020 6.6  6.0 - 8.5 g/dL Final    Albumin 07/02/2020 4.7  3.8 - 4.9 g/dL Final    GLOBULIN, TOTAL 07/02/2020 1.9  1.5 - 4.5 g/dL Final    A-G Ratio 07/02/2020 2.5* 1.2 - 2.2 Final    Bilirubin, total 07/02/2020 0.6  0.0 - 1.2 mg/dL Final    Alk.  phosphatase 07/02/2020 71  39 - 117 IU/L Final    AST (SGOT) 07/02/2020 19  0 - 40 IU/L Final    ALT (SGPT) 07/02/2020 18  0 - 44 IU/L Final    Immunoglobulin A, Qt. 07/02/2020 102  90 - 386 mg/dL Final    Deamidated Gliadin Ab, IgA 07/02/2020 2  0 - 19 units Final    Comment:                    Negative                   0 - 19                     Weak Positive             20 - 30                     Moderate to Strong Positive   >30      Deamidated Gliadin Ab, IgG 07/02/2020 2  0 - 19 units Final    Comment:                    Negative 0 - 19                     Weak Positive             20 - 30                     Moderate to Strong Positive   >30      t-Transglutaminase, IgA 07/02/2020 <2  0 - 3 U/mL Final    Comment:                               Negative        0 -  3                                Weak Positive   4 - 10                                Positive           >10   Tissue Transglutaminase (tTG) has been identified   as the endomysial antigen. Studies have demonstr-   ated that endomysial IgA antibodies have over 99%   specificity for gluten sensitive enteropathy.  t-Transglutaminase, IgG 07/02/2020 <2  0 - 5 U/mL Final    Comment:                               Negative        0 - 5                                Weak Positive   6 - 9                                Positive           >9       Review of Systems   Constitutional: Negative for activity change, fatigue and unexpected weight change. HENT: Positive for sore throat. Negative for congestion, ear discharge, ear pain, hearing loss and rhinorrhea. Eyes: Negative for pain, discharge and redness. Respiratory: Negative for cough, chest tightness and shortness of breath. Cardiovascular: Negative for leg swelling. Gastrointestinal: Negative for abdominal pain, constipation and diarrhea. Endocrine: Negative for polyuria. Genitourinary: Negative for dysuria, flank pain and urgency. Musculoskeletal: Negative for arthralgias, back pain and myalgias. Skin: Negative for color change. Neurological: Negative for dizziness, light-headedness and headaches.        +coldness in extremities   Psychiatric/Behavioral: Negative for dysphoric mood and sleep disturbance. The patient is not nervous/anxious. No flowsheet data found.     Physical Exam    [INSTRUCTIONS:  \"[x]\" Indicates a positive item  \"[]\" Indicates a negative item  -- DELETE ALL ITEMS NOT EXAMINED]    Constitutional: [x] Appears well-developed and well-nourished [x] No apparent distress      [] Abnormal -     Mental status: [x] Alert and awake  [x] Oriented to person/place/time [x] Able to follow commands    [] Abnormal -     Eyes:   EOM    [x]  Normal    [] Abnormal -   Sclera  [x]  Normal    [] Abnormal -          Discharge [x]  None visible   [] Abnormal -     HENT: [x] Normocephalic, atraumatic  [] Abnormal -   [x] Mouth/Throat: Mucous membranes are moist    External Ears [x] Normal  [] Abnormal -    Neck: [x] No visualized mass [] Abnormal -     Pulmonary/Chest: [x] Respiratory effort normal   [x] No visualized signs of difficulty breathing or respiratory distress        [] Abnormal -      Musculoskeletal:   [x] Normal gait with no signs of ataxia         [x] Normal range of motion of neck        [] Abnormal -     Neurological:        [x] No Facial Asymmetry (Cranial nerve 7 motor function) (limited exam due to video visit)          [x] No gaze palsy        [] Abnormal -          Skin:        [x] No significant exanthematous lesions or discoloration noted on facial skin         [] Abnormal -            Psychiatric:       [x] Normal Affect [] Abnormal -        [x] No Hallucinations    Other pertinent observable physical exam findings:-      ASSESSMENT/PLAN:  1. Acquired hypothyroidism  -     METABOLIC PANEL, COMPREHENSIVE; Future  -     CBC W/O DIFF; Future  -     TSH 3RD GENERATION; Future  I ordered labs for him to come in and have done soon. He is currently taking levothyroxine 50 mcg every day. 2. Complaining of cold hands  Check TSH. We discussed that this sx could be relate to his thyroid. 3. Sore throat  I instructed him to take an antihistamine such as Zyrtec every day as his sore throat seems to be a combination of allergies and his GI issues. 4. Environmental allergies  Instructed him to start taking an antihistamine such as Zytrec every day.      5. External hemorrhoid  -     hydrocortisone (CORTIFOAM) 10 % (80 mg) rectal foam; Insert 1 Applicator into rectum every other day., Normal, Disp-15 g, R-0 sent to pharmacy. Potential side effects were discussed. I prescribed Cortifoam for his bleeding associated with constipation. 6. Chronic jaw pain  We reviewed the findings of his XR and I explained that everything came back normal. Should talk to the ENT getting CT scan done. This plan was reviewed with the patient and patient agrees. All questions were answered. This scribe documentation was reviewed by me and accurately reflects the examination and decisions made by me. Elly Maza is being evaluated by a Virtual Visit (video visit) encounter to address concerns as mentioned above. . Due to this being a TeleHealth encounter (During WMXDC-06 public health emergency), evaluation of the following organ systems was limited: Vitals/Constitutional/EENT/Resp/CV/GI//MS/Neuro/Skin/Heme-Lymph-Imm. Pursuant to the emergency declaration under the [address] [place] [address] and the [employer] Act, this Virtual Visit was conducted with patient's (and/or legal guardian's) consent, to reduce the patient's risk of exposure to COVID-19 and provide necessary medical care. The patient (and/or legal guardian) has also been advised to contact this office for worsening conditions or problems, and seek emergency medical treatment and/or call 911 if deemed necessary. Patient identification was verified at the start of the visit: YES    Services were provided through a video synchronous discussion virtually to substitute for in-person clinic visit. Patient was located at home and provider was located in office. An electronic signature was used to authenticate this note.   -- Elvin Schmidt

## 2021-01-21 DIAGNOSIS — E03.9 ACQUIRED HYPOTHYROIDISM: Primary | ICD-10-CM

## 2021-01-21 RX ORDER — LEVOTHYROXINE SODIUM 75 UG/1
75 TABLET ORAL
Qty: 90 TAB | Refills: 0 | Status: SHIPPED | OUTPATIENT
Start: 2021-01-21 | End: 2021-04-20

## 2021-02-09 DIAGNOSIS — K21.9 GASTROESOPHAGEAL REFLUX DISEASE: ICD-10-CM

## 2021-02-09 RX ORDER — OMEPRAZOLE 20 MG/1
CAPSULE, DELAYED RELEASE ORAL
Qty: 30 CAP | Refills: 0 | Status: SHIPPED | OUTPATIENT
Start: 2021-02-09 | End: 2021-03-11 | Stop reason: SDUPTHER

## 2021-02-17 DIAGNOSIS — K64.4 HEMORRHOIDS, EXTERNAL: Primary | ICD-10-CM

## 2021-02-17 RX ORDER — HYDROCORTISONE 25 MG/G
CREAM TOPICAL 4 TIMES DAILY
Qty: 30 G | Refills: 0 | Status: SHIPPED | OUTPATIENT
Start: 2021-02-17 | End: 2022-04-21 | Stop reason: ALTCHOICE

## 2021-03-11 DIAGNOSIS — K21.9 GASTROESOPHAGEAL REFLUX DISEASE: ICD-10-CM

## 2021-03-11 RX ORDER — OMEPRAZOLE 20 MG/1
CAPSULE, DELAYED RELEASE ORAL
Qty: 30 CAP | Refills: 0 | Status: SHIPPED | OUTPATIENT
Start: 2021-03-11 | End: 2021-04-11

## 2021-04-07 ENCOUNTER — OFFICE VISIT (OUTPATIENT)
Dept: PRIMARY CARE CLINIC | Age: 51
End: 2021-04-07
Payer: COMMERCIAL

## 2021-04-07 VITALS
DIASTOLIC BLOOD PRESSURE: 78 MMHG | WEIGHT: 140.41 LBS | SYSTOLIC BLOOD PRESSURE: 119 MMHG | BODY MASS INDEX: 19.66 KG/M2 | HEIGHT: 71 IN | TEMPERATURE: 97 F | HEART RATE: 78 BPM

## 2021-04-07 DIAGNOSIS — Z11.59 NEED FOR HEPATITIS C SCREENING TEST: ICD-10-CM

## 2021-04-07 DIAGNOSIS — E55.9 VITAMIN D DEFICIENCY: ICD-10-CM

## 2021-04-07 DIAGNOSIS — Z00.00 PHYSICAL EXAM: Primary | ICD-10-CM

## 2021-04-07 DIAGNOSIS — E03.9 ACQUIRED HYPOTHYROIDISM: ICD-10-CM

## 2021-04-07 DIAGNOSIS — K21.9 GASTROESOPHAGEAL REFLUX DISEASE WITHOUT ESOPHAGITIS: ICD-10-CM

## 2021-04-07 LAB
25(OH)D3 SERPL-MCNC: 20.5 NG/ML (ref 30–100)
ALBUMIN SERPL-MCNC: 4.6 G/DL (ref 3.5–5)
ALBUMIN/GLOB SERPL: 1.5 {RATIO} (ref 1.1–2.2)
ALP SERPL-CCNC: 107 U/L (ref 45–117)
ALT SERPL-CCNC: 42 U/L (ref 12–78)
ANION GAP SERPL CALC-SCNC: 3 MMOL/L (ref 5–15)
AST SERPL-CCNC: 29 U/L (ref 15–37)
BILIRUB SERPL-MCNC: 0.5 MG/DL (ref 0.2–1)
BUN SERPL-MCNC: 19 MG/DL (ref 6–20)
BUN/CREAT SERPL: 25 (ref 12–20)
CALCIUM SERPL-MCNC: 9.5 MG/DL (ref 8.5–10.1)
CHLORIDE SERPL-SCNC: 107 MMOL/L (ref 97–108)
CHOLEST SERPL-MCNC: 194 MG/DL
CO2 SERPL-SCNC: 32 MMOL/L (ref 21–32)
CREAT SERPL-MCNC: 0.77 MG/DL (ref 0.7–1.3)
ERYTHROCYTE [DISTWIDTH] IN BLOOD BY AUTOMATED COUNT: 12.2 % (ref 11.5–14.5)
GLOBULIN SER CALC-MCNC: 3.1 G/DL (ref 2–4)
GLUCOSE SERPL-MCNC: 99 MG/DL (ref 65–100)
HCT VFR BLD AUTO: 43.8 % (ref 36.6–50.3)
HCV AB SERPL QL IA: NONREACTIVE
HCV COMMENT,HCGAC: NORMAL
HDLC SERPL-MCNC: 78 MG/DL
HDLC SERPL: 2.5 {RATIO} (ref 0–5)
HGB BLD-MCNC: 14.3 G/DL (ref 12.1–17)
LDLC SERPL CALC-MCNC: 106.8 MG/DL (ref 0–100)
LIPID PROFILE,FLP: ABNORMAL
MCH RBC QN AUTO: 28.9 PG (ref 26–34)
MCHC RBC AUTO-ENTMCNC: 32.6 G/DL (ref 30–36.5)
MCV RBC AUTO: 88.7 FL (ref 80–99)
NRBC # BLD: 0 K/UL (ref 0–0.01)
NRBC BLD-RTO: 0 PER 100 WBC
PLATELET # BLD AUTO: 243 K/UL (ref 150–400)
PMV BLD AUTO: 9.7 FL (ref 8.9–12.9)
POTASSIUM SERPL-SCNC: 4 MMOL/L (ref 3.5–5.1)
PROT SERPL-MCNC: 7.7 G/DL (ref 6.4–8.2)
RBC # BLD AUTO: 4.94 M/UL (ref 4.1–5.7)
SODIUM SERPL-SCNC: 142 MMOL/L (ref 136–145)
TRIGL SERPL-MCNC: 46 MG/DL (ref ?–150)
TSH SERPL DL<=0.05 MIU/L-ACNC: 2.39 UIU/ML (ref 0.36–3.74)
VLDLC SERPL CALC-MCNC: 9.2 MG/DL
WBC # BLD AUTO: 5.3 K/UL (ref 4.1–11.1)

## 2021-04-07 PROCEDURE — 99396 PREV VISIT EST AGE 40-64: CPT | Performed by: INTERNAL MEDICINE

## 2021-04-07 NOTE — PROGRESS NOTES
Written by Lindsay Michael, as dictated by Dr. Eduardo Montoya MD.    Sanju Allen (: 1970) is a 44 y.o. male, established patient, here for evaluation of the following chief complaint(s):  Physical       ASSESSMENT/PLAN:  1. Physical exam  -     METABOLIC PANEL, COMPREHENSIVE; Future  -     CBC W/O DIFF; Future  -     LIPID PANEL; Future  Complete physical exam done. Basic labs drawn. He follows with a urologist and gets his prostate checked annually. I recommended he work on building muscle by lifting weights and increasing his protein intake, he should work with a  to make sure he is keeping his back safe. 2. Acquired hypothyroidism  -     TSH 3RD GENERATION; Future  Check TSH. I explained that thyroid disease can cause coldness in the fingers and toes. He could also have Raynaud's Syndrome and should take care to keep his hands and feet covered in cold environments. 3. Gastroesophageal reflux disease without esophagitis  He recently had his endoscopy done with Dr. Artis Rodriguez and will have the results sent over. Continues on Prilosec 20 mg every day. 4. Need for hepatitis C screening test  -     HEPATITIS C AB; Future  I ordered a hepatitis C screening for health maintenance. 5. Vitamin D deficiency  -     VITAMIN D, 25 HYDROXY; Future  Check vitamin D, he is not currently taking vitamin D daily. Additional Comments: I instructed him to notify me in MyChart how much more PT he plans to do for his jaw pain and how it is working for him. Numbers given to make an appointment for COVID vaccine. SUBJECTIVE/OBJECTIVE:  HPI  The patient comes in fasting today for a complete physical examination and labs. He would like to work on building strength and muscle, but in a way that does not hurt his back. He has an acute care problem to discuss, always feeling cold.  His hands and feet feel very cold in the mornings, and he has to wear a sweater even if it is warm outside. When he went to [address] [place] [address] he asked if this could be related to his back problems, but they told him no. He has been going to PT for his TJM and has been making progress. He still gets lots of pain from this, and so he does feel like he will need more PT. At his next appt he will ask his physical therapist how many more sessions he needs. He just had his endoscopy and colonoscopy done last week with Dr. Reinaldo Mcnair and remembers having normal mucosa in his stomach with some irritation, and two polyps in his colon which were removed. He does not remember his hemorrhoids looking bad on the colonoscopy. He will have his results sent here. He has not been taking vitamin D, although his vitamin D came back low at 28.1 on 02/08/19. He would like to get the COVID vaccine and has not gone about the process of getting it, inquires if it is available here. Patient Active Problem List   Diagnosis Code    Acquired hypothyroidism E03.9    GERD (gastroesophageal reflux disease) K21.9    History of Helicobacter pylori infection Z86.19        Current Outpatient Medications on File Prior to Visit   Medication Sig Dispense Refill    omeprazole (PRILOSEC) 20 mg capsule TAKE 1 CAPSULE BY MOUTH EVERY DAY 30 Cap 0    levothyroxine (SYNTHROID) 75 mcg tablet Take 1 Tab by mouth Daily (before breakfast) for 90 days. 90 Tab 0    multivitamin (ONE A DAY) tablet Take 1 Tab by mouth daily.  calcium carbonate (TUMS) 200 mg calcium (500 mg) chew Take 1 Tab by mouth as needed.  hydrocortisone (ANUSOL-HC) 2.5 % rectal cream Insert  into rectum four (4) times daily. 30 g 0    hydrocortisone (CORTIFOAM) 10 % (80 mg) rectal foam Insert 1 Applicator into rectum every other day. 15 g 0    cyanocobalamin (VITAMIN B-12) 1,000 mcg tablet Take 1,000 mcg by mouth daily. No current facility-administered medications on file prior to visit.         No Known Allergies    Past Medical History:   Diagnosis Date    Acquired hypothyroidism 4/10/2017    GERD (gastroesophageal reflux disease)     Helicobacter pylori gastritis 03/2018       Past Surgical History:   Procedure Laterality Date    HX COLONOSCOPY  03/2018    Dr. Lorrie Reyes, repeat 10 years    HX ENDOSCOPY  03/2018    Dr. Lorrie Reyes       Family History   Problem Relation Age of Onset    Thyroid Disease Mother     Elevated Lipids Mother     No Known Problems Father        Social History     Socioeconomic History    Marital status:      Spouse name: Not on file    Number of children: Not on file    Years of education: Not on file    Highest education level: Not on file   Occupational History    Not on file   Social Needs    Financial resource strain: Not on file    Food insecurity     Worry: Not on file     Inability: Not on file   [employer] needs     Medical: Not on file     Non-medical: Not on file   Tobacco Use    Smoking status: Never Smoker    Smokeless tobacco: Never Used   Substance and Sexual Activity    Alcohol use: No    Drug use: No    Sexual activity: Yes     Partners: Female     Birth control/protection: Condom   Lifestyle    Physical activity     Days per week: Not on file     Minutes per session: Not on file    Stress: Not on file   Relationships    Social connections     Talks on phone: Not on file     Gets together: Not on file     Attends Nondenominational service: Not on file     Active member of club or organization: Not on file     Attends meetings of clubs or organizations: Not on file     Relationship status: Not on file    Intimate partner violence     Fear of current or ex partner: Not on file     Emotionally abused: Not on file     Physically abused: Not on file     Forced sexual activity: Not on file   Other Topics Concern    Not on file   Social History Narrative    Not on file       Abstract on 01/25/2021   Component Date Value Ref Range Status    SARS-CoV-2, BABATUNDE 08/28/2020 Not Detected   Final   Orders Only on 01/18/2021   Component Date Value Ref Range Status    TSH 01/18/2021 4.93* 0.36 - 3.74 uIU/mL Final    Comment:   Due to TSH heterogeneity, both structurally and degree of glycosylation,  monoclonal antibodies used in the TSH assay may not accurately quantitate TSH. Therefore, this result should be correlated with clinical findings as well as  with other assessments of thyroid function, e.g., free T4, free T3.      WBC 01/18/2021 5.2  4.1 - 11.1 K/uL Final    RBC 01/18/2021 5.28  4.10 - 5.70 M/uL Final    HGB 01/18/2021 15.3  12.1 - 17.0 g/dL Final    HCT 01/18/2021 46.8  36.6 - 50.3 % Final    MCV 01/18/2021 88.6  80.0 - 99.0 FL Final    MCH 01/18/2021 29.0  26.0 - 34.0 PG Final    MCHC 01/18/2021 32.7  30.0 - 36.5 g/dL Final    RDW 01/18/2021 11.8  11.5 - 14.5 % Final    PLATELET 62/19/4292 872  150 - 400 K/uL Final    MPV 01/18/2021 10.3  8.9 - 12.9 FL Final    NRBC 01/18/2021 0.0  0  WBC Final    ABSOLUTE NRBC 01/18/2021 0.00  0.00 - 0.01 K/uL Final    Sodium 01/18/2021 138  136 - 145 mmol/L Final    Potassium 01/18/2021 4.0  3.5 - 5.1 mmol/L Final    Chloride 01/18/2021 103  97 - 108 mmol/L Final    CO2 01/18/2021 26  21 - 32 mmol/L Final    Anion gap 01/18/2021 9  5 - 15 mmol/L Final    Glucose 01/18/2021 98  65 - 100 mg/dL Final    BUN 01/18/2021 17  6 - 20 MG/DL Final    Creatinine 01/18/2021 0.93  0.70 - 1.30 MG/DL Final    BUN/Creatinine ratio 01/18/2021 18  12 - 20   Final    GFR est AA 01/18/2021 >60  >60 ml/min/1.73m2 Final    GFR est non-AA 01/18/2021 >60  >60 ml/min/1.73m2 Final    Comment: Estimated GFR is calculated using the IDMS-traceable Modification of Diet in  Renal Disease (MDRD) Study equation, reported for both  Americans  (GFRAA) and non- Americans (GFRNA), and normalized to 1.73m2 body  surface area. The physician must decide which value applies to the patient.       Calcium 01/18/2021 10.0  8.5 - 10.1 MG/DL Final    Bilirubin, total 01/18/2021 0.5  0.2 - 1.0 MG/DL Final    ALT (SGPT) 01/18/2021 34  12 - 78 U/L Final    AST (SGOT) 01/18/2021 23  15 - 37 U/L Final    Alk. phosphatase 01/18/2021 99  45 - 117 U/L Final    Protein, total 01/18/2021 8.2  6.4 - 8.2 g/dL Final    Albumin 01/18/2021 5.0  3.5 - 5.0 g/dL Final    Globulin 01/18/2021 3.2  2.0 - 4.0 g/dL Final    A-G Ratio 01/18/2021 1.6  1.1 - 2.2   Final     Review of Systems   Constitutional: Negative for activity change, fatigue and unexpected weight change. HENT: Negative for congestion, ear discharge, ear pain, hearing loss, rhinorrhea and sore throat. +R TMJ pain   Eyes: Negative for pain, discharge and redness. Respiratory: Negative for cough, chest tightness and shortness of breath. Cardiovascular: Negative for leg swelling. Gastrointestinal: Negative for abdominal pain, constipation and diarrhea. Endocrine: Positive for cold intolerance (cold extremities). Negative for polyuria. Genitourinary: Negative for dysuria, flank pain and urgency. Musculoskeletal: Negative for arthralgias, back pain and myalgias. Skin: Negative for color change. Neurological: Negative for dizziness, light-headedness and headaches. Psychiatric/Behavioral: Negative for dysphoric mood and sleep disturbance. The patient is not nervous/anxious. Visit Vitals  /78 (BP 1 Location: Right upper arm, BP Patient Position: Sitting, BP Cuff Size: Small adult)   Pulse 78   Temp 97 °F (36.1 °C) (Temporal)   Ht 5' 10.59\" (1.793 m)   Wt 140 lb 6.6 oz (63.7 kg)   BMI 19.81 kg/m²       Physical Exam  Vitals signs and nursing note reviewed. Constitutional:       General: He is not in acute distress. Appearance: Normal appearance. He is well-developed. He is not diaphoretic. HENT:      Head: Normocephalic and atraumatic.       Right Ear: Tympanic membrane, ear canal and external ear normal.      Left Ear: Tympanic membrane, ear canal and external ear normal. Mouth/Throat:      Mouth: Mucous membranes are moist.      Pharynx: Oropharynx is clear. Eyes:      General: No scleral icterus. Right eye: No discharge. Left eye: No discharge. Extraocular Movements: Extraocular movements intact. Conjunctiva/sclera: Conjunctivae normal.      Pupils: Pupils are equal, round, and reactive to light. Neck:      Musculoskeletal: Normal range of motion and neck supple. Thyroid: No thyromegaly. Cardiovascular:      Rate and Rhythm: Normal rate and regular rhythm. Pulses: Normal pulses. Dorsalis pedis pulses are 2+ on the right side and 2+ on the left side. Heart sounds: Normal heart sounds. Pulmonary:      Effort: Pulmonary effort is normal.      Breath sounds: Normal breath sounds. No wheezing. Abdominal:      General: Bowel sounds are normal. There is no distension. Palpations: Abdomen is soft. Tenderness: There is no abdominal tenderness. Musculoskeletal:      Right lower leg: No edema. Left lower leg: No edema. Comments: B/L knees without crepitus   Lymphadenopathy:      Cervical: No cervical adenopathy. Neurological:      Mental Status: He is alert and oriented to person, place, and time. Deep Tendon Reflexes:      Reflex Scores:       Patellar reflexes are 2+ on the right side and 2+ on the left side. Psychiatric:         Mood and Affect: Mood and affect normal.         An electronic signature was used to authenticate this note.   -- Stephanie Eason

## 2021-04-07 NOTE — PROGRESS NOTES
Job Naidu, your blood report is back and Vitamin D came back low. Please continue taking Vitamin D3 daily. Thyroid number came back normal. Cholesterol numbers look good. Your HDL( good cholesterol) came back high & LDL ( bad cholesterol) has improved.

## 2021-04-07 NOTE — PROGRESS NOTES
Chief Complaint   Patient presents with    Physical     1. Have you been to the ER, urgent care clinic since your last visit? Hospitalized since your last visit? Yes Where: [facility] infection, [facility]    2. Have you seen or consulted any other health care providers outside of the [address] [place] [address] since your last visit? Include any pap smears or colon screening.  No    Visit Vitals  /78 (BP 1 Location: Right upper arm, BP Patient Position: Sitting, BP Cuff Size: Small adult)   Pulse 78   Temp 97 °F (36.1 °C) (Temporal)   Ht 5' 10.59\" (1.793 m)   Wt 140 lb 6.6 oz (63.7 kg)   BMI 19.81 kg/m²

## 2021-04-11 DIAGNOSIS — K21.9 GASTROESOPHAGEAL REFLUX DISEASE: ICD-10-CM

## 2021-04-11 RX ORDER — OMEPRAZOLE 20 MG/1
CAPSULE, DELAYED RELEASE ORAL
Qty: 30 CAP | Refills: 0 | Status: SHIPPED | OUTPATIENT
Start: 2021-04-11 | End: 2021-05-13

## 2021-04-20 DIAGNOSIS — E03.9 ACQUIRED HYPOTHYROIDISM: ICD-10-CM

## 2021-04-20 RX ORDER — LEVOTHYROXINE SODIUM 75 UG/1
TABLET ORAL
Qty: 90 TAB | Refills: 0 | Status: SHIPPED | OUTPATIENT
Start: 2021-04-20 | End: 2021-05-18 | Stop reason: SDUPTHER

## 2021-05-13 DIAGNOSIS — K21.9 GASTROESOPHAGEAL REFLUX DISEASE: ICD-10-CM

## 2021-05-13 RX ORDER — OMEPRAZOLE 20 MG/1
CAPSULE, DELAYED RELEASE ORAL
Qty: 30 CAP | Refills: 0 | Status: SHIPPED | OUTPATIENT
Start: 2021-05-13 | End: 2021-06-14

## 2021-05-18 DIAGNOSIS — E03.9 ACQUIRED HYPOTHYROIDISM: ICD-10-CM

## 2021-05-18 RX ORDER — LEVOTHYROXINE SODIUM 75 UG/1
TABLET ORAL
Qty: 90 TAB | Refills: 0 | Status: SHIPPED | OUTPATIENT
Start: 2021-05-18 | End: 2021-12-16

## 2021-05-18 NOTE — TELEPHONE ENCOUNTER
Requested Prescriptions     Pending Prescriptions Disp Refills    levothyroxine (SYNTHROID) 75 mcg tablet 90 Tab 0     Sig: TAKE 1 TABLET BY MOUTH DAILY BEFORE BREAKFAST        Last Visit 4/7/21  Last Refill 4/20/21

## 2021-06-13 DIAGNOSIS — K21.9 GASTROESOPHAGEAL REFLUX DISEASE: ICD-10-CM

## 2021-06-14 RX ORDER — OMEPRAZOLE 20 MG/1
CAPSULE, DELAYED RELEASE ORAL
Qty: 30 CAPSULE | Refills: 0 | Status: SHIPPED | OUTPATIENT
Start: 2021-06-14 | End: 2021-07-12

## 2021-07-12 DIAGNOSIS — K21.9 GASTROESOPHAGEAL REFLUX DISEASE: ICD-10-CM

## 2021-07-12 RX ORDER — OMEPRAZOLE 20 MG/1
CAPSULE, DELAYED RELEASE ORAL
Qty: 30 CAPSULE | Refills: 0 | Status: SHIPPED | OUTPATIENT
Start: 2021-07-12 | End: 2021-08-13

## 2021-08-13 DIAGNOSIS — K21.9 GASTROESOPHAGEAL REFLUX DISEASE: ICD-10-CM

## 2021-08-13 RX ORDER — OMEPRAZOLE 20 MG/1
CAPSULE, DELAYED RELEASE ORAL
Qty: 30 CAPSULE | Refills: 0 | Status: SHIPPED | OUTPATIENT
Start: 2021-08-13 | End: 2021-09-07

## 2021-09-07 DIAGNOSIS — K21.9 GASTROESOPHAGEAL REFLUX DISEASE: ICD-10-CM

## 2021-09-07 RX ORDER — OMEPRAZOLE 20 MG/1
CAPSULE, DELAYED RELEASE ORAL
Qty: 30 CAPSULE | Refills: 0 | Status: SHIPPED | OUTPATIENT
Start: 2021-09-07 | End: 2021-10-03

## 2021-09-20 ENCOUNTER — OFFICE VISIT (OUTPATIENT)
Dept: PRIMARY CARE CLINIC | Age: 51
End: 2021-09-20
Payer: COMMERCIAL

## 2021-09-20 VITALS
DIASTOLIC BLOOD PRESSURE: 69 MMHG | HEIGHT: 71 IN | WEIGHT: 140 LBS | HEART RATE: 85 BPM | TEMPERATURE: 98.2 F | RESPIRATION RATE: 16 BRPM | OXYGEN SATURATION: 100 % | SYSTOLIC BLOOD PRESSURE: 114 MMHG | BODY MASS INDEX: 19.6 KG/M2

## 2021-09-20 DIAGNOSIS — G89.29 CHRONIC MIDLINE LOW BACK PAIN WITHOUT SCIATICA: ICD-10-CM

## 2021-09-20 DIAGNOSIS — E55.9 VITAMIN D DEFICIENCY: ICD-10-CM

## 2021-09-20 DIAGNOSIS — M26.609 TEMPOROMANDIBULAR JOINT DISORDER (TMJ): Primary | ICD-10-CM

## 2021-09-20 DIAGNOSIS — M54.50 CHRONIC MIDLINE LOW BACK PAIN WITHOUT SCIATICA: ICD-10-CM

## 2021-09-20 DIAGNOSIS — E03.9 ACQUIRED HYPOTHYROIDISM: ICD-10-CM

## 2021-09-20 DIAGNOSIS — R39.15 URINARY URGENCY: ICD-10-CM

## 2021-09-20 LAB
25(OH)D3 SERPL-MCNC: 23.2 NG/ML (ref 30–100)
ALBUMIN SERPL-MCNC: 4.2 G/DL (ref 3.5–5)
ALBUMIN/GLOB SERPL: 1.3 {RATIO} (ref 1.1–2.2)
ALP SERPL-CCNC: 95 U/L (ref 45–117)
ALT SERPL-CCNC: 34 U/L (ref 12–78)
ANION GAP SERPL CALC-SCNC: 2 MMOL/L (ref 5–15)
AST SERPL-CCNC: 23 U/L (ref 15–37)
BILIRUB SERPL-MCNC: 0.6 MG/DL (ref 0.2–1)
BUN SERPL-MCNC: 16 MG/DL (ref 6–20)
BUN/CREAT SERPL: 17 (ref 12–20)
CALCIUM SERPL-MCNC: 10 MG/DL (ref 8.5–10.1)
CHLORIDE SERPL-SCNC: 106 MMOL/L (ref 97–108)
CO2 SERPL-SCNC: 32 MMOL/L (ref 21–32)
COMMENT, HOLDF: NORMAL
CREAT SERPL-MCNC: 0.96 MG/DL (ref 0.7–1.3)
GLOBULIN SER CALC-MCNC: 3.3 G/DL (ref 2–4)
GLUCOSE SERPL-MCNC: 97 MG/DL (ref 65–100)
POTASSIUM SERPL-SCNC: 3.9 MMOL/L (ref 3.5–5.1)
PROT SERPL-MCNC: 7.5 G/DL (ref 6.4–8.2)
PSA SERPL-MCNC: 2.8 NG/ML (ref 0.01–4)
SAMPLES BEING HELD,HOLD: NORMAL
SODIUM SERPL-SCNC: 140 MMOL/L (ref 136–145)
TSH SERPL DL<=0.05 MIU/L-ACNC: 2.05 UIU/ML (ref 0.36–3.74)

## 2021-09-20 PROCEDURE — 99214 OFFICE O/P EST MOD 30 MIN: CPT | Performed by: INTERNAL MEDICINE

## 2021-09-20 RX ORDER — MELATONIN
DAILY
COMMUNITY
End: 2022-04-21 | Stop reason: ALTCHOICE

## 2021-09-20 NOTE — PROGRESS NOTES
Holli Lora (: 1970) is a 46 y.o. male, established patient, here for evaluation of the following chief complaint(s):  Labs (Kidney Liver and PSA), Other (TMJ), and Other (right ear pressure/dizziness)     Written by Corey Barrera, as dictated by Dr. Emeka Figueroa MD.      ASSESSMENT/PLAN:  Below is the assessment and plan developed based on review of pertinent history, physical exam, labs, studies, and medications. 1. Temporomandibular joint disorder (TMJ)  Continue with PT as necessary. 2. Chronic midline low back pain without sciatica  Continue with PT as necessary. Followed by Ortho. 3. Acquired hypothyroidism  Continue taking Synthroid 75 mcg as prescribed. Ordered labs to check metabolic panel and TSH levels. Waiting for results. Referred to Endocrinology.   -     METABOLIC PANEL, COMPREHENSIVE; Future  -     TSH 3RD GENERATION; Future  -     REFERRAL TO ENDOCRINOLOGY    4. Vitamin D deficiency  Ordered lab work to check Vitamin D levels. Waiting for results. Continue taking a Vitamin D supplement of 1,000 units daily. -     VITAMIN D, 25 HYDROXY; Future    5. Urinary urgency  Ordered labs to check PSA levels. Waiting for results. -     PSA, DIAGNOSTIC (PROSTATE SPECIFIC AG); Future    SUBJECTIVE/OBJECTIVE:  HPI    Patient presents today to for a follow up visit. He has a hx of TMJ and he completed PT and notes his sxs have improved. He completed an Xray of the mandible and results were unremarkable. He c/o lower back pain. He is currently completing PT and notes some sxs improvement. He has a hx of R ear pressure that causes dizziness x 2 days. He c/o urinary urgency. He takes Synthroid 75 mcg for hypothyroidism. He c/o sporadic chills in his extremities. He takes Prilosec 20 mg for GERD. He c/o sore throat and trouble swallowing. He presented to the ER 2020 and was dx'd with dyspepsia.  He completed an endoscopy on 20 which revealed H. 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 He was taking OTC Zyrtec daily but stopped because he finished the medication. He takes an OTC Vitamin D supplement daily. Patient Active Problem List   Diagnosis Code    Acquired hypothyroidism E03.9    GERD (gastroesophageal reflux disease) K21.9    History of Helicobacter pylori infection Z86.19        Current Outpatient Medications on File Prior to Visit   Medication Sig Dispense Refill    cholecalciferol (Vitamin D3) (1000 Units /25 mcg) tablet Take  by mouth daily.  omeprazole (PRILOSEC) 20 mg capsule TAKE 1 CAPSULE BY MOUTH EVERY DAY 30 Capsule 0    levothyroxine (SYNTHROID) 75 mcg tablet TAKE 1 TABLET BY MOUTH DAILY BEFORE BREAKFAST 90 Tab 0    calcium carbonate (TUMS) 200 mg calcium (500 mg) chew Take 1 Tab by mouth as needed.  hydrocortisone (ANUSOL-HC) 2.5 % rectal cream Insert  into rectum four (4) times daily. 30 g 0    hydrocortisone (CORTIFOAM) 10 % (80 mg) rectal foam Insert 1 Applicator into rectum every other day. 15 g 0    cyanocobalamin (VITAMIN B-12) 1,000 mcg tablet Take 1,000 mcg by mouth daily. (Patient not taking: Reported on 9/20/2021)      multivitamin (ONE A DAY) tablet Take 1 Tab by mouth daily. (Patient not taking: Reported on 9/20/2021)       No current facility-administered medications on file prior to visit.        No Known Allergies    Past Medical History:   Diagnosis Date    Acquired hypothyroidism 4/10/2017    GERD (gastroesophageal reflux disease)     Helicobacter pylori gastritis 03/2018       Past Surgical History:   Procedure Laterality Date    HX COLONOSCOPY  03/2018    Dr. Lord Robles, repeat 10 years    HX ENDOSCOPY  03/2018    Dr. Lord Robles       Family History   Problem Relation Age of Onset    Thyroid Disease Mother     Elevated Lipids Mother     No Known Problems Father        Social History     Socioeconomic History    Marital status:      Spouse name: Not on file    Number of children: Not on file    Years of education: Not on file    Highest education level: Not on file   Occupational History    Not on file   Tobacco Use    Smoking status: Never Smoker    Smokeless tobacco: Never Used   Vaping Use    Vaping Use: Never used   Substance and Sexual Activity    Alcohol use: No    Drug use: No    Sexual activity: Yes     Partners: Female     Birth control/protection: Condom   Other Topics Concern    Not on file   Social History Narrative    Not on file     Social Determinants of Health     Financial Resource Strain:     Difficulty of Paying Living Expenses:    Food Insecurity:     Worried About Running Out of Food in the Last Year:     [address] in the Last Year:    Transportation Needs:     Lack of Transportation (Medical):  Lack of Transportation (Non-Medical):    Physical Activity:     Days of Exercise per Week:     Minutes of Exercise per Session:    Stress:     Feeling of Stress :    Social Connections:     Frequency of Communication with Friends and Family:     Frequency of Social Gatherings with Friends and Family:     Attends Buddhism Services:     Active Member of Clubs or Organizations:     Attends Club or Organization Meetings:     Marital Status:    Intimate Partner Violence:     Fear of Current or Ex-Partner:     Emotionally Abused:     Physically Abused:     Sexually Abused:        No visits with results within 3 Month(s) from this visit. Latest known visit with results is:   Office Visit on 04/07/2021   Component Date Value Ref Range Status    Hep C virus Ab Interp.  04/07/2021 NONREACTIVE  NONREACTIVE   Final    Hep C  virus Ab comment 04/07/2021 Method used is [facility]    Final    Vitamin D 25-Hydroxy 04/07/2021 20.5* 30 - 100 ng/mL Final    Comment: (NOTE)  Deficiency               <20 ng/mL  Insufficiency          20-30 ng/mL  Sufficient             ng/mL  Possible toxicity       >100 ng/mL    The Method used is Siem[employer] currently standardized to a   Center of Disease Control and Prevention (CDC) certified reference   method. Samples containing fluorescein dye can produce falsely   elevated values when tested with the [employer] Centaur Vitamin D Assay. It is recommended that results in the toxic range, >100 ng/mL, be   retested 72 hours post fluorescein exposure.  TSH 04/07/2021 2.39  0.36 - 3.74 uIU/mL Final    Comment:   Due to TSH heterogeneity, both structurally and degree of glycosylation,  monoclonal antibodies used in the TSH assay may not accurately quantitate TSH. Therefore, this result should be correlated with clinical findings as well as  with other assessments of thyroid function, e.g., free T4, free T3.      LIPID PROFILE 04/07/2021        Final    Cholesterol, total 04/07/2021 194  <200 MG/DL Final    Triglyceride 04/07/2021 46  <150 MG/DL Final    Comment: Based on NCEP-ATP III:  Triglycerides <150 mg/dL  is considered normal, 150-199  mg/dL  borderline high,  200-499 mg/dL high and  greater than or equal to 500  mg/dL very high.  HDL Cholesterol 04/07/2021 78  MG/DL Final    Comment: Based on NCEP ATP III, HDL Cholesterol <40 mg/dL is considered low and >60  mg/dL is elevated.       LDL, calculated 04/07/2021 106.8* 0 - 100 MG/DL Final    Comment: Based on the NCEP-ATP: LDL-C concentrations are considered  optimal <100 mg/dL,  near optimal/above Normal 100-129 mg/dL Borderline High: 130-159, High: 160-189  mg/dL Very High: Greater than or equal to 190 mg/dL      VLDL, calculated 04/07/2021 9.2  MG/DL Final    CHOL/HDL Ratio 04/07/2021 2.5  0.0 - 5.0   Final    WBC 04/07/2021 5.3  4.1 - 11.1 K/uL Final    RBC 04/07/2021 4.94  4.10 - 5.70 M/uL Final    HGB 04/07/2021 14.3  12.1 - 17.0 g/dL Final    HCT 04/07/2021 43.8  36.6 - 50.3 % Final    MCV 04/07/2021 88.7  80.0 - 99.0 FL Final    MCH 04/07/2021 28.9  26.0 - 34.0 PG Final    MCHC 04/07/2021 32.6  30.0 - 36.5 g/dL Final  RDW 04/07/2021 12.2  11.5 - 14.5 % Final    PLATELET 61/73/5799 574  150 - 400 K/uL Final    MPV 04/07/2021 9.7  8.9 - 12.9 FL Final    NRBC 04/07/2021 0.0  0  WBC Final    ABSOLUTE NRBC 04/07/2021 0.00  0.00 - 0.01 K/uL Final    Sodium 04/07/2021 142  136 - 145 mmol/L Final    Potassium 04/07/2021 4.0  3.5 - 5.1 mmol/L Final    Chloride 04/07/2021 107  97 - 108 mmol/L Final    CO2 04/07/2021 32  21 - 32 mmol/L Final    Anion gap 04/07/2021 3* 5 - 15 mmol/L Final    Glucose 04/07/2021 99  65 - 100 mg/dL Final    BUN 04/07/2021 19  6 - 20 MG/DL Final    Creatinine 04/07/2021 0.77  0.70 - 1.30 MG/DL Final    BUN/Creatinine ratio 04/07/2021 25* 12 - 20   Final    GFR est AA 04/07/2021 >60  >60 ml/min/1.73m2 Final    GFR est non-AA 04/07/2021 >60  >60 ml/min/1.73m2 Final    Comment: Estimated GFR is calculated using the IDMS-traceable Modification of Diet in  Renal Disease (MDRD) Study equation, reported for both  Americans  (GFRAA) and non- Americans (GFRNA), and normalized to 1.73m2 body  surface area. The physician must decide which value applies to the patient.  Calcium 04/07/2021 9.5  8.5 - 10.1 MG/DL Final    Bilirubin, total 04/07/2021 0.5  0.2 - 1.0 MG/DL Final    ALT (SGPT) 04/07/2021 42  12 - 78 U/L Final    AST (SGOT) 04/07/2021 29  15 - 37 U/L Final    Alk. phosphatase 04/07/2021 107  45 - 117 U/L Final    Protein, total 04/07/2021 7.7  6.4 - 8.2 g/dL Final    Albumin 04/07/2021 4.6  3.5 - 5.0 g/dL Final    Globulin 04/07/2021 3.1  2.0 - 4.0 g/dL Final    A-G Ratio 04/07/2021 1.5  1.1 - 2.2   Final       Review of Systems   Constitutional: Positive for chills. Negative for activity change, fatigue and unexpected weight change. HENT: Positive for ear pain, sore throat and trouble swallowing. Negative for congestion, ear discharge, hearing loss and rhinorrhea. Eyes: Negative for pain, discharge and redness.    Respiratory: Negative for cough, chest tightness and shortness of breath. Cardiovascular: Negative for leg swelling. Gastrointestinal: Negative for abdominal pain, constipation and diarrhea. Endocrine: Negative for polyuria. Genitourinary: Positive for urgency. Negative for dysuria and flank pain. Musculoskeletal: Positive for back pain. Negative for arthralgias and myalgias. Skin: Negative for color change. Neurological: Positive for dizziness. Negative for light-headedness and headaches. Psychiatric/Behavioral: Negative for dysphoric mood and sleep disturbance. The patient is not nervous/anxious. Visit Vitals  /69 (BP 1 Location: Left arm, BP Patient Position: Sitting, BP Cuff Size: Small adult)   Pulse 85   Temp 98.2 °F (36.8 °C) (Oral)   Resp 16   Ht 5' 10.59\" (1.793 m)   Wt 140 lb (63.5 kg)   SpO2 100%   BMI 19.75 kg/m²       Physical Exam  Vitals and nursing note reviewed. Constitutional:       General: He is not in acute distress. Appearance: Normal appearance. He is well-developed. He is not diaphoretic. HENT:      Head: Normocephalic and atraumatic. Right Ear: External ear normal.      Left Ear: External ear normal.      Mouth/Throat:      Mouth: Mucous membranes are moist.      Pharynx: Oropharynx is clear. Eyes:      General: No scleral icterus. Right eye: No discharge. Left eye: No discharge. Extraocular Movements: Extraocular movements intact. Conjunctiva/sclera: Conjunctivae normal.      Pupils: Pupils are equal, round, and reactive to light. Cardiovascular:      Rate and Rhythm: Normal rate and regular rhythm. Pulses: Normal pulses. Heart sounds: Normal heart sounds. Pulmonary:      Effort: Pulmonary effort is normal.      Breath sounds: Normal breath sounds. No wheezing. Musculoskeletal:      Right lower leg: No edema. Left lower leg: No edema. Neurological:      Mental Status: He is alert and oriented to person, place, and time.    Psychiatric: Mood and Affect: Mood and affect normal.             An electronic signature was used to authenticate this note.   -- Sharyle Goodell

## 2021-09-20 NOTE — PROGRESS NOTES
Chief Complaint   Patient presents with    Labs     Kidney Liver and PSA    Other     TMJ    Other     right ear pressure/dizziness     3 most recent PHQ Screens 9/20/2021   Little interest or pleasure in doing things Not at all   Feeling down, depressed, irritable, or hopeless Not at all   Total Score PHQ 2 0   Trouble falling or staying asleep, or sleeping too much -   Feeling tired or having little energy -   Poor appetite, weight loss, or overeating -   Feeling bad about yourself - or that you are a failure or have let yourself or your family down -   Trouble concentrating on things such as school, work, reading, or watching TV -   Moving or speaking so slowly that other people could have noticed; or the opposite being so fidgety that others notice -   Thoughts of being better off dead, or hurting yourself in some way -   PHQ 9 Score -     Abuse Screening Questionnaire 9/20/2021   Do you ever feel afraid of your partner? N   Are you in a relationship with someone who physically or mentally threatens you? N   Is it safe for you to go home? Y     Visit [facility] 5' 10.59\" (1.793 m)   Wt 140 lb (63.5 kg)   BMI 19.75 kg/m²     1. Have you been to the ER, urgent care clinic since your last visit? Hospitalized since your last visit?no    2. Have you seen or consulted any other health care providers outside of the [address] [place] [address] since your last visit? Include any pap smears or colon screening.  ENT

## 2021-09-21 NOTE — PROGRESS NOTES
Results reviewed. Release via [facility], your blood report is back and vitamin D is still low. Please keep taking vitamin D 3 daily. Rest of your blood report including thyroid number(TSH) came back fine.

## 2021-10-03 DIAGNOSIS — K21.9 GASTROESOPHAGEAL REFLUX DISEASE: ICD-10-CM

## 2021-10-03 RX ORDER — OMEPRAZOLE 20 MG/1
CAPSULE, DELAYED RELEASE ORAL
Qty: 30 CAPSULE | Refills: 0 | Status: SHIPPED | OUTPATIENT
Start: 2021-10-03 | End: 2021-11-03

## 2021-12-16 DIAGNOSIS — E03.9 ACQUIRED HYPOTHYROIDISM: ICD-10-CM

## 2021-12-16 RX ORDER — LEVOTHYROXINE SODIUM 75 UG/1
TABLET ORAL
Qty: 90 TABLET | Refills: 0 | Status: SHIPPED | OUTPATIENT
Start: 2021-12-16 | End: 2022-01-30 | Stop reason: ALTCHOICE

## 2022-01-18 ENCOUNTER — NURSE TRIAGE (OUTPATIENT)
Dept: OTHER | Facility: CLINIC | Age: 52
End: 2022-01-18

## 2022-01-18 NOTE — TELEPHONE ENCOUNTER
Received call from [address] at [facility] with Red Flag Complaint. Subjective: Caller states \"I usually have cold feet and hands, but sometimes it I have to warm up a blanket to get it under control. I am watching what I eat to see if there is anything causing it\"     Current Symptoms: Cold hands and feet worse when sitting down, dizziness at times. Endoscopy showed some gastritis. Was instructed to continue omeprazole. MRI of back and neck for pain - did Physical therapy sessions some TMJ - no change in cold s/s    Onset: 2 years ago; worsening    Associated Symptoms: reduced activity,     Pain Severity: States no pain currently, states only occasional abdominal discomfort; cramping; intermittent    Temperature: None Pt states no fever    What has been tried: Tea, honey, avoiding lactose and coffee, continue to take levothyroxine - little relief    LMP: NA Pregnant: NA    Recommended disposition: See PCP in office in 2 weeks    Care advice provided, patient verbalizes understanding; denies any other questions or concerns; instructed to call back for any new or worsening symptoms. Writer provided warm transfer to Marva Wilburn at [facility] for appointment scheduling    Attention Provider: Thank you for allowing me to participate in the care of your patient. The patient was connected to triage in response to information provided to the [facility]. Please do not respond through this encounter as the response is not directed to a shared pool.       Reason for Disposition   Neck pain is a chronic symptom (recurrent or ongoing AND lasting > 4 weeks)    Protocols used: NECK PAIN OR STIFFNESS-ADULT-[place]

## 2022-01-26 ENCOUNTER — OFFICE VISIT (OUTPATIENT)
Dept: PRIMARY CARE CLINIC | Age: 52
End: 2022-01-26
Payer: COMMERCIAL

## 2022-01-26 VITALS
OXYGEN SATURATION: 99 % | RESPIRATION RATE: 15 BRPM | WEIGHT: 145.6 LBS | DIASTOLIC BLOOD PRESSURE: 77 MMHG | BODY MASS INDEX: 20.84 KG/M2 | HEIGHT: 70 IN | SYSTOLIC BLOOD PRESSURE: 128 MMHG | HEART RATE: 72 BPM | TEMPERATURE: 97.8 F

## 2022-01-26 DIAGNOSIS — E55.9 VITAMIN D DEFICIENCY: ICD-10-CM

## 2022-01-26 DIAGNOSIS — M26.621 ARTHRALGIA OF RIGHT TEMPOROMANDIBULAR JOINT: ICD-10-CM

## 2022-01-26 DIAGNOSIS — E03.9 ACQUIRED HYPOTHYROIDISM: Primary | ICD-10-CM

## 2022-01-26 DIAGNOSIS — R20.9 COLD EXTREMITIES: ICD-10-CM

## 2022-01-26 DIAGNOSIS — R53.83 OTHER FATIGUE: ICD-10-CM

## 2022-01-26 PROBLEM — D12.6 TUBULAR ADENOMA OF COLON: Status: ACTIVE | Noted: 2022-01-26

## 2022-01-26 PROCEDURE — 99214 OFFICE O/P EST MOD 30 MIN: CPT | Performed by: INTERNAL MEDICINE

## 2022-01-26 NOTE — PROGRESS NOTES
Chief Complaint   Patient presents with    Cold extremity     hands and feet are cold       Visit Vitals  /77 (BP 1 Location: Left arm)   Pulse 72   Temp 97.8 °F (36.6 °C)   Resp 15   Ht 5' 10\" (1.778 m)   Wt 145 lb 9.6 oz (66 kg)   SpO2 99%   BMI 20.89 kg/m²       1. Have you been to the ER, urgent care clinic since your last visit? Hospitalized since your last visit? No    2. Have you seen or consulted any other health care providers outside of the [address] [place] [address] since your last visit? Include any pap smears or colon screening.  Yes [facility]

## 2022-01-26 NOTE — PROGRESS NOTES
Alexandr Yeung (: 1970) is a 46 y.o. male, established patient, here for evaluation of the following chief complaint(s):  Cold extremity (hands and feet are cold)     Written by Selvin Orosco, as dictated by Dr. Danielle Bridges MD.      ASSESSMENT/PLAN:  Below is the assessment and plan developed based on review of pertinent history, physical exam, labs, studies, and medications. 1. Acquired hypothyroidism  Recheck TSH. Continue on levothyroxine 75 mcg daily for now. Discussed abnormal thyroid levels can be causing his cold intolerance. -     TSH 3RD GENERATION; Future    2. Cold extremities  I provided him with a referral to Dr. Cher Castellano (rheumatology). -     REFERRAL TO RHEUMATOLOGY    3. Other fatigue  I provided him with a referral to Dr. Cher Castellano (rheumatology). Discussed this may be due abnormal thyroid levels. -     REFERRAL TO RHEUMATOLOGY    4. Vitamin D deficiency  Will recheck vitamin D level. Continue on OTC vitamin D3 daily. -     VITAMIN D, 25 HYDROXY; Future    5. Arthralgia of right temporomandibular joint  Recommend he discuss this with rheumatology. SUBJECTIVE/OBJECTIVE:  HPI   The patient presents today c/o cold intolerance. He states that his hands and feet are constantly cold. He has been putting blankets in the dryer to warm them up to try and get warmer. He has hypothyroidism and is on levothyroxine 75 mcg daily. He had a colonoscopy on 2020. He has chronic gastritis and is on omeprazole 20 mg daily. He takes vitamin D3 daily due to vitamin D deficiency. He has a hx of TMJ pain and has completed PT for this in the past. He continues to have TMJ pain.      Patient Active Problem List   Diagnosis Code    Acquired hypothyroidism E03.9    GERD (gastroesophageal reflux disease) K21.9    History of Helicobacter pylori infection Z86.19    Tubular adenoma of colon D12.6    Arthralgia of right temporomandibular joint M26.621        Current Outpatient Medications on File Prior to Visit   Medication Sig Dispense Refill    levothyroxine (SYNTHROID) 75 mcg tablet TAKE 1 TABLET BY MOUTH EVERY DAY BEFORE BREAKFAST 90 Tablet 0    omeprazole (PRILOSEC) 20 mg capsule TAKE 1 CAPSULE BY MOUTH EVERY DAY 30 Capsule 2    cholecalciferol (Vitamin D3) (1000 Units /25 mcg) tablet Take  by mouth daily.  calcium carbonate (TUMS) 200 mg calcium (500 mg) chew Take 1 Tab by mouth as needed.  hydrocortisone (ANUSOL-HC) 2.5 % rectal cream Insert  into rectum four (4) times daily. (Patient not taking: Reported on 1/26/2022) 30 g 0    hydrocortisone (CORTIFOAM) 10 % (80 mg) rectal foam Insert 1 Applicator into rectum every other day. (Patient not taking: Reported on 1/26/2022) 15 g 0    cyanocobalamin (VITAMIN B-12) 1,000 mcg tablet Take 1,000 mcg by mouth daily. (Patient not taking: Reported on 9/20/2021)      multivitamin (ONE A DAY) tablet Take 1 Tab by mouth daily. (Patient not taking: Reported on 1/26/2022)       No current facility-administered medications on file prior to visit.        No Known Allergies    Past Medical History:   Diagnosis Date    Acquired hypothyroidism 4/10/2017    GERD (gastroesophageal reflux disease)     Helicobacter pylori gastritis 03/2018       Past Surgical History:   Procedure Laterality Date    HX COLONOSCOPY  03/2018    Dr. Santos Contreras, repeat 10 years    HX ENDOSCOPY  03/2018    Dr. Santos Contreras       Family History   Problem Relation Age of Onset    Thyroid Disease Mother     Elevated Lipids Mother     No Known Problems Father        Social History     Socioeconomic History    Marital status:      Spouse name: Not on file    Number of children: Not on file    Years of education: Not on file    Highest education level: Not on file   Occupational History    Not on file   Tobacco Use    Smoking status: Never Smoker    Smokeless tobacco: Never Used   Vaping Use    Vaping Use: Never used   Substance and Sexual Activity    Alcohol use: No    Drug use: No    Sexual activity: Yes     Partners: Female     Birth control/protection: Condom   Other Topics Concern    Not on file   Social History Narrative    Not on file       No visits with results within 3 Month(s) from this visit. Latest known visit with results is:   Office Visit on 09/20/2021   Component Date Value Ref Range Status    Vitamin D 25-Hydroxy 09/20/2021 23.2* 30 - 100 ng/mL Final    Comment: (NOTE)  Deficiency               <20 ng/mL  Insufficiency          20-30 ng/mL  Sufficient             ng/mL  Possible toxicity       >100 ng/mL    The Method used is Siemens Advia Centaur currently standardized to a   Center of Disease Control and Prevention (CDC) certified reference   [address] [facility]. Samples containing fluorescein dye can produce falsely   elevated values when tested with the ADVIA Centaur Vitamin D Assay. It is recommended that results in the toxic range, >100 ng/mL, be   retested 72 hours post fluorescein exposure.  TSH 09/20/2021 2.05  0.36 - 3.74 uIU/mL Final    Comment:   Due to TSH heterogeneity, both structurally and degree of glycosylation,  monoclonal antibodies used in the TSH assay may not accurately quantitate TSH. Therefore, this result should be correlated with clinical findings as well as  with other assessments of thyroid function, e.g., free T4, free T3.       Sodium 09/20/2021 140  136 - 145 mmol/L Final    Potassium 09/20/2021 3.9  3.5 - 5.1 mmol/L Final    Chloride 09/20/2021 106  97 - 108 mmol/L Final    CO2 09/20/2021 32  21 - 32 mmol/L Final    Anion gap 09/20/2021 2* 5 - 15 mmol/L Final    Glucose 09/20/2021 97  65 - 100 mg/dL Final    BUN 09/20/2021 16  6 - 20 MG/DL Final    Creatinine 09/20/2021 0.96  0.70 - 1.30 MG/DL Final    BUN/Creatinine ratio 09/20/2021 17  12 - 20   Final    GFR est AA 09/20/2021 >60  >60 ml/min/1.73m2 Final    GFR est non-AA 09/20/2021 >60  >60 ml/min/1.73m2 Final    Comment: Estimated GFR is calculated using the IDMS-traceable Modification of Diet in  Renal Disease (MDRD) Study equation, reported for both  Americans  (GFRAA) and non- Americans (GFRNA), and normalized to 1.73m2 body  surface area. The physician must decide which value applies to the patient.  Calcium 09/20/2021 10.0  8.5 - 10.1 MG/DL Final    Bilirubin, total 09/20/2021 0.6  0.2 - 1.0 MG/DL Final    ALT (SGPT) 09/20/2021 34  12 - 78 U/L Final    AST (SGOT) 09/20/2021 23  15 - 37 U/L Final    Alk. phosphatase 09/20/2021 95  45 - 117 U/L Final    Protein, total 09/20/2021 7.5  6.4 - 8.2 g/dL Final    Albumin 09/20/2021 4.2  3.5 - 5.0 g/dL Final    Globulin 09/20/2021 3.3  2.0 - 4.0 g/dL Final    A-G Ratio 09/20/2021 1.3  1.1 - 2.2   Final    Prostate Specific Ag 09/20/2021 2.8  0.01 - 4.0 ng/mL Final    Comment: Method used is opentabs  (NOTE)  Many types of test methods are used to measure PSA and can yield   different results on any given specimen. Therefore PSA results from   different laboratories on the same patient are not directly   comparable. In addition, PSA values by themselves should not be   interpreted as the presence or absence of cancer. PSA values used to   monitor for biochemical recurrence of prostate cancer should be   interpreted in accordance with current clinical guidelines (e.g. the   2013 American Urological Association (AUA) guidelines and the 2015    Association of Urology (EAU) guidelines).  SAMPLES BEING HELD 09/20/2021 2SST   Final    COMMENT 09/20/2021 Add-on orders for these samples will be processed based on acceptable specimen integrity and analyte stability, which may vary by analyte. Final      Review of Systems   Constitutional: Positive for fatigue. Negative for activity change and unexpected weight change. HENT: Negative for congestion, ear discharge, ear pain, hearing loss, rhinorrhea and sore throat.     Eyes: Negative for pain, discharge and redness. Respiratory: Negative for cough, chest tightness and shortness of breath. Cardiovascular: Negative for leg swelling. Gastrointestinal: Negative for abdominal pain, constipation and diarrhea. Endocrine: Positive for cold intolerance. Negative for polyuria. Genitourinary: Negative for dysuria, flank pain and urgency. Musculoskeletal: Positive for arthralgias. Negative for back pain and myalgias. Skin: Negative for color change. Neurological: Negative for dizziness, light-headedness and headaches. Psychiatric/Behavioral: Negative for dysphoric mood and sleep disturbance. The patient is not nervous/anxious. Visit Vitals  /77 (BP 1 Location: Left arm)   Pulse 72   Temp 97.8 °F (36.6 °C)   Resp 15   Ht 5' 10\" (1.778 m)   Wt 145 lb 9.6 oz (66 kg)   SpO2 99%   BMI 20.89 kg/m²      Physical Exam  Vitals and nursing note reviewed. Constitutional:       General: He is not in acute distress. Appearance: Normal appearance. He is well-developed. He is not diaphoretic. HENT:      Head: Normocephalic and atraumatic. Right Ear: External ear normal.      Left Ear: External ear normal.      Mouth/Throat:      Mouth: Mucous membranes are moist.      Pharynx: Oropharynx is clear. Eyes:      General: No scleral icterus. Right eye: No discharge. Left eye: No discharge. Extraocular Movements: Extraocular movements intact. Conjunctiva/sclera: Conjunctivae normal.      Pupils: Pupils are equal, round, and reactive to light. Cardiovascular:      Rate and Rhythm: Normal rate and regular rhythm. Pulses: Normal pulses. Heart sounds: Normal heart sounds. Pulmonary:      Effort: Pulmonary effort is normal.      Breath sounds: Normal breath sounds. No wheezing. Musculoskeletal:      Right lower leg: No edema. Left lower leg: No edema. Neurological:      Mental Status: He is alert and oriented to person, place, and time.    Psychiatric: Mood and Affect: Mood and affect normal.       An electronic signature was used to authenticate this note.   -- Jared Caldera

## 2022-01-27 LAB
25(OH)D3 SERPL-MCNC: 24.6 NG/ML (ref 30–100)
COMMENT, HOLDF: NORMAL
SAMPLES BEING HELD,HOLD: NORMAL
TSH SERPL DL<=0.05 MIU/L-ACNC: 2.49 UIU/ML (ref 0.36–3.74)

## 2022-01-30 DIAGNOSIS — E03.9 ACQUIRED HYPOTHYROIDISM: Primary | ICD-10-CM

## 2022-01-30 DIAGNOSIS — K21.9 GASTROESOPHAGEAL REFLUX DISEASE: ICD-10-CM

## 2022-01-30 RX ORDER — LEVOTHYROXINE SODIUM 88 UG/1
88 TABLET ORAL
Qty: 90 TABLET | Refills: 0 | Status: SHIPPED | OUTPATIENT
Start: 2022-01-30 | End: 2022-04-06 | Stop reason: SDUPTHER

## 2022-01-30 RX ORDER — OMEPRAZOLE 20 MG/1
CAPSULE, DELAYED RELEASE ORAL
Qty: 90 CAPSULE | Refills: 0 | Status: SHIPPED | OUTPATIENT
Start: 2022-01-30 | End: 2022-07-12

## 2022-03-18 PROBLEM — E03.9 ACQUIRED HYPOTHYROIDISM: Status: ACTIVE | Noted: 2017-04-10

## 2022-03-19 PROBLEM — Z86.19 HISTORY OF HELICOBACTER PYLORI INFECTION: Status: ACTIVE | Noted: 2018-08-13

## 2022-03-19 PROBLEM — D12.6 TUBULAR ADENOMA OF COLON: Status: ACTIVE | Noted: 2022-01-26

## 2022-03-19 PROBLEM — M26.621 ARTHRALGIA OF RIGHT TEMPOROMANDIBULAR JOINT: Status: ACTIVE | Noted: 2022-01-26

## 2022-04-06 DIAGNOSIS — E03.9 ACQUIRED HYPOTHYROIDISM: ICD-10-CM

## 2022-04-06 RX ORDER — LEVOTHYROXINE SODIUM 88 UG/1
88 TABLET ORAL
Qty: 90 TABLET | Refills: 0 | Status: SHIPPED | OUTPATIENT
Start: 2022-04-06 | End: 2022-04-24 | Stop reason: DRUGHIGH

## 2022-04-21 ENCOUNTER — OFFICE VISIT (OUTPATIENT)
Dept: ENDOCRINOLOGY | Age: 52
End: 2022-04-21
Payer: COMMERCIAL

## 2022-04-21 VITALS
HEART RATE: 73 BPM | HEIGHT: 70 IN | BODY MASS INDEX: 21.33 KG/M2 | WEIGHT: 149 LBS | SYSTOLIC BLOOD PRESSURE: 133 MMHG | DIASTOLIC BLOOD PRESSURE: 71 MMHG

## 2022-04-21 DIAGNOSIS — E03.9 ACQUIRED HYPOTHYROIDISM: Primary | ICD-10-CM

## 2022-04-21 DIAGNOSIS — R20.9 COLD EXTREMITIES: ICD-10-CM

## 2022-04-21 PROCEDURE — 99204 OFFICE O/P NEW MOD 45 MIN: CPT | Performed by: INTERNAL MEDICINE

## 2022-04-21 NOTE — PROGRESS NOTES
Chief Complaint   Patient presents with    Thyroid Problem       * New Patient Visit     General:  Diagnosed with hypothyroidism a few years ago- initially on 50mcg   Symptoms did not go away when put on thyroid medication  Symptoms include cold hands and feet; some weakness as well   Had to leave work b/c shivering   Currently on 88mcg - increased 6 weeks ago, before that was the 75mcg  Mom is on thyroid medication  Wt based dose ~112mcg   Takes in AM with water and eats 30min later   No Fe/MTV/Ca  Is on omeprazole - takes this later in day (for past month)      Has seen Cards and all ok   Sent to [facility] to look for raynauds - no labs, told to see vascular    Thyroid Symptoms  **has decreased energy**,some days **has weight gain**, denies change in appetite, denies sleep issues, denies hair changes, no skin changes, denies sweats, denies hot intolerance, **has cold intolerance**, denies tremor, denies palpitations,except sometimes high when cold denies change in bowels, denies mood changes    Neck Symptoms  denies dysphagia, denies anterior neck pain, denies neck swelling, notes no nodules    Labs/Studies    Lab Results   Component Value Date/Time    TSH 1.950 04/21/2022 02:31 PM    T3, total 108 04/21/2022 02:31 PM    T3 Uptake 27 04/21/2022 02:31 PM    Triiodothyronine (T3), free 4.0 09/12/2016 08:20 AM    T4, Free 1.72 08/13/2018 03:05 PM    T4, Total 11.1 04/21/2022 02:31 PM    Thyroid peroxidase Ab <6 09/12/2016 08:20 AM        Lab Results   Component Value Date/Time    TSH 1.950 04/21/2022 02:31 PM    TSH 2.49 01/26/2022 01:20 PM    TSH 2.05 09/20/2021 09:54 AM    TSH 2.39 04/07/2021 09:33 AM    TSH 4.93 (H) 01/18/2021 04:04 PM    TSH 3.490 05/28/2020 10:55 AM    TSH 4.350 03/25/2020 09:53 AM    TSH 3.000 09/06/2019 11:10 AM    TSH 2.820 02/08/2019 10:14 AM    TSH 2.160 08/13/2018 03:05 PM    TSH 5.60 (H) 05/25/2018 02:38 PM    TSH 3.55 03/01/2018 10:05 AM    TSH 2.790 10/06/2017 11:16 AM    TSH 2.270 04/10/2017 12:43 PM    TSH 6.890 (H) 09/12/2016 08:20 AM        Past Medical History:   Diagnosis Date    Acquired hypothyroidism 4/10/2017    GERD (gastroesophageal reflux disease)     Helicobacter pylori gastritis 03/2018        Blood pressure 133/71, pulse 73, height 5' 10\" (1.778 m), weight 149 lb (67.6 kg). Weight Metrics 4/21/2022 1/26/2022 9/20/2021 4/7/2021 11/15/2020 11/3/2020 7/2/2020   Weight 149 lb 145 lb 9.6 oz 140 lb 140 lb 6.6 oz 139 lb 15.9 oz 139 lb 3.2 oz 138 lb 12.8 oz   BMI 21.38 kg/m2 20.89 kg/m2 19.75 kg/m2 19.81 kg/m2 20.67 kg/m2 20.56 kg/m2 20.5 kg/m2        EXAM:  - GENERAL: NCAT, Appears well nourished   - EYES: EOMI, non-icteric, no proptosis   - Ear/Nose/Throat: NCAT, no visible inflammation or masses   - CARDIOVASCULAR: no cyanosis, no visible JVD   - RESPIRATORY: respiratory effort normal without any distress or labored breathing   - MUSCULOSKELETAL: Normal ROM of neck and upper extremities observed   - SKIN: No rash on face  - NEUROLOGIC:  No facial asymmetry (Cranial nerve 7 motor function), No gaze palsy   - PSYCHIATRIC: Normal affect, Normal insight and judgement    Assessment/Plan:   1. Acquired hypothyroidism  Although TPO/Tg Ab negative, has required higher doses over time   Until up to wt based dose of ~112mcg may require higher doses  Was increased ~6 wks ago - check current status  Will switch to brand and get TSH   Asks about T3 and T4 - advised at diagnosis FT3 was upper limits of normal so does not have converting issue but will reassess today    2. Cold extremities  Has seen Cards and Rheum, now being referred to Vasc. Unlikely thyroid related as unchanged on thyroid hormone replacement   We can push the TSH closer to 1 to see if helps, but am doubtful    There are no diagnoses linked to this encounter.      Orders Placed This Encounter    THYROID PANEL W/TSH    T3 TOTAL    TSH 3RD GENERATION     Standing Status:   Future     Standing Expiration Date: 10/21/2022        Follow-up and Dispositions    · Return in about 2 months (around 6/21/2022).   Follow-up and Disposition History

## 2022-04-22 LAB
FT4I SERPL CALC-MCNC: 3 (ref 1.2–4.9)
T3 SERPL-MCNC: 108 NG/DL (ref 71–180)
T3RU NFR SERPL: 27 % (ref 24–39)
T4 SERPL-MCNC: 11.1 UG/DL (ref 4.5–12)
TSH SERPL DL<=0.005 MIU/L-ACNC: 1.95 UIU/ML (ref 0.45–4.5)

## 2022-04-24 RX ORDER — LEVOTHYROXINE SODIUM 100 UG/1
TABLET ORAL
Qty: 30 TABLET | Refills: 2 | Status: SHIPPED | OUTPATIENT
Start: 2022-04-24 | End: 2022-08-08

## 2022-05-03 ENCOUNTER — TELEPHONE (OUTPATIENT)
Dept: ENDOCRINOLOGY | Age: 52
End: 2022-05-03

## 2022-05-03 NOTE — TELEPHONE ENCOUNTER
5/3/2022  03:15 PM      Pt was calling to check on his prescription for Unithroid 100 mg. Pt stated he checked my chart to see his blood work results for his tsh. Pt stated  was hoping to get it lowered from 1.9 to 1.0 so she was going to write him a prescription for unithroid 100 mg. Pt stated the prescription is still not at the pharmacy. #[phone]      Thanks,  Kanika Shafer

## 2022-05-04 NOTE — TELEPHONE ENCOUNTER
I returned this call and let  Vonnie Flory know that Dr. Jake Salguero had sent in a script on 4/24/22. He is going to call the pharmacy to check if they have the script and if not he will give me a call back and I will resend it.   Carol Guadarrama

## 2022-05-06 ENCOUNTER — TELEPHONE (OUTPATIENT)
Dept: ENDOCRINOLOGY | Age: 52
End: 2022-05-06

## 2022-05-06 NOTE — TELEPHONE ENCOUNTER
I returned this call and let Mr. Del Szymanski know that I called the pharmacy and they told me that the script is ready for pick and has been ready since 4/25/2022.  He wanted to know why the pharmacy didn't tell him that it was ready and I told him to ask the pharmacy that question  Kathia Llanes

## 2022-05-06 NOTE — TELEPHONE ENCOUNTER
Pt called 5/6 @ 3:52pm. Stated he was to give bibi a call if [facility] did not receive the rx order for unithroid. Pt stated they did not receive it and asked for it to be sent again.     Pt# [phone]

## 2022-05-21 DIAGNOSIS — E03.9 ACQUIRED HYPOTHYROIDISM: ICD-10-CM

## 2022-07-12 DIAGNOSIS — K21.9 GASTROESOPHAGEAL REFLUX DISEASE: ICD-10-CM

## 2022-07-12 RX ORDER — OMEPRAZOLE 20 MG/1
CAPSULE, DELAYED RELEASE ORAL
Qty: 90 CAPSULE | Refills: 0 | Status: SHIPPED | OUTPATIENT
Start: 2022-07-12

## 2022-08-08 DIAGNOSIS — E03.9 ACQUIRED HYPOTHYROIDISM: ICD-10-CM

## 2022-08-08 RX ORDER — LEVOTHYROXINE SODIUM 100 UG/1
TABLET ORAL
Qty: 90 TABLET | Refills: 0 | Status: SHIPPED | OUTPATIENT
Start: 2022-08-08 | End: 2022-08-17 | Stop reason: DRUGHIGH

## 2022-08-16 ENCOUNTER — OFFICE VISIT (OUTPATIENT)
Dept: ENDOCRINOLOGY | Age: 52
End: 2022-08-16
Payer: COMMERCIAL

## 2022-08-16 VITALS
WEIGHT: 151 LBS | SYSTOLIC BLOOD PRESSURE: 109 MMHG | HEIGHT: 70 IN | BODY MASS INDEX: 21.62 KG/M2 | DIASTOLIC BLOOD PRESSURE: 64 MMHG | HEART RATE: 71 BPM

## 2022-08-16 DIAGNOSIS — E03.9 ACQUIRED HYPOTHYROIDISM: Primary | ICD-10-CM

## 2022-08-16 PROCEDURE — 99214 OFFICE O/P EST MOD 30 MIN: CPT | Performed by: INTERNAL MEDICINE

## 2022-08-16 NOTE — PROGRESS NOTES
Chief Complaint   Patient presents with    Thyroid Problem       * Since Last Visit: 4/21/2022     We increased to 100mcg (TSH was 1.9)  Is on brand, takes correctly  No cold flares in hands but still some issues but overall better   Did not see vascular, but may now since not gone on thyroid hormone replacement   No hyper symptoms  Last 1-2 weeks more cold Sx, but also ran out of Vit D     General:  From initial visit: 4/21/2022   Diagnosed with hypothyroidism a few years ago- initially on 50mcg   Symptoms did not go away when put on thyroid medication  Symptoms include cold hands and feet; some weakness as well   Had to leave work b/c shivering   Currently on 88mcg - increased 6 weeks ago, before that was the 75mcg  Mom is on thyroid medication  Wt based dose ~112mcg   Takes in AM with water and eats 30min later   No Fe/MTV/Ca  Is on omeprazole - takes this later in day (for past month)      Has seen Cards and all ok   Sent to Rheum to look for raynauds - no labs, told to see vascular    Thyroid Symptoms  denies change in energy,some days **has weight gain**, denies change in appetite, denies sleep issues, denies hair changes, no skin changes, denies sweats, denies hot intolerance, **has cold intolerance**, denies tremor, denies palpitations,except sometimes high when cold denies change in bowels, denies mood changes    Neck Symptoms  denies dysphagia, denies anterior neck pain, denies neck swelling, notes no nodules    Labs/Studies    Lab Results   Component Value Date/Time    TSH 1.950 04/21/2022 02:31 PM    T3, total 108 04/21/2022 02:31 PM    T3 Uptake 27 04/21/2022 02:31 PM    Triiodothyronine (T3), free 4.0 09/12/2016 08:20 AM    T4, Free 1.72 08/13/2018 03:05 PM    T4, Total 11.1 04/21/2022 02:31 PM    Thyroid peroxidase Ab <6 09/12/2016 08:20 AM        Lab Results   Component Value Date/Time    TSH 1.950 04/21/2022 02:31 PM    TSH 2.49 01/26/2022 01:20 PM    TSH 2.05 09/20/2021 09:54 AM    TSH 2.39 04/07/2021 09:33 AM    TSH 4.93 (H) 01/18/2021 04:04 PM    TSH 3.490 05/28/2020 10:55 AM    TSH 4.350 03/25/2020 09:53 AM    TSH 3.000 09/06/2019 11:10 AM    TSH 2.820 02/08/2019 10:14 AM    TSH 2.160 08/13/2018 03:05 PM    TSH 5.60 (H) 05/25/2018 02:38 PM    TSH 3.55 03/01/2018 10:05 AM    TSH 2.790 10/06/2017 11:16 AM    TSH 2.270 04/10/2017 12:43 PM    TSH 6.890 (H) 09/12/2016 08:20 AM        Past Medical History:   Diagnosis Date    Acquired hypothyroidism 4/10/2017    GERD (gastroesophageal reflux disease)     Helicobacter pylori gastritis 03/2018        Blood pressure 109/64, pulse 71, height 5' 10\" (1.778 m), weight 151 lb (68.5 kg). Weight Metrics 8/16/2022 4/21/2022 1/26/2022 9/20/2021 4/7/2021 11/15/2020 11/3/2020   Weight 151 lb 149 lb 145 lb 9.6 oz 140 lb 140 lb 6.6 oz 139 lb 15.9 oz 139 lb 3.2 oz   BMI 21.67 kg/m2 21.38 kg/m2 20.89 kg/m2 19.75 kg/m2 19.81 kg/m2 20.67 kg/m2 20.56 kg/m2        EXAM:  - not done today     Assessment/Plan:   1. Acquired hypothyroidism  Although TPO/Tg Ab negative, has required higher doses over time   Until up to wt based dose of ~112mcg may require higher doses  Currently on 100mcg - does feel cold extremities are improved (but slightly worse last 2 weeks)  Check current status and adjust prn     2. Cold extremities  Has seen Cards and Rheum, now being referred to Vasc - has not gone yet  Unlikely thyroid related as unchanged on thyroid hormone replacement -- except with last dose change, did seem to make a difference  We can continue to push the TSH closer to 1     Orders Placed This Encounter    TSH 3RD GENERATION          Follow-up and Dispositions    Return for --- follow up to be determined once labs are back.

## 2022-08-16 NOTE — LETTER
8/16/2022    Patient: Tera Slater   YOB: 1970   Date of Visit: 8/16/2022     Nolberto Darby MD  [address] [place] [address]  [address] [facility] [address]    Dear Nolberto Darby MD,      Thank you for referring Mr. Claudetta Nay to [facility] for evaluation. My notes for this consultation are attached. If you have questions, please do not hesitate to call me. I look forward to following your patient along with you.       Sincerely,    Monica Agrawal MD

## 2022-08-17 LAB — TSH SERPL DL<=0.005 MIU/L-ACNC: 1.29 UIU/ML (ref 0.45–4.5)

## 2022-08-17 RX ORDER — LEVOTHYROXINE SODIUM 112 UG/1
TABLET ORAL
Qty: 30 TABLET | Refills: 2 | Status: SHIPPED | OUTPATIENT
Start: 2022-08-17

## 2022-08-24 ENCOUNTER — TELEPHONE (OUTPATIENT)
Dept: ENDOCRINOLOGY | Age: 52
End: 2022-08-24

## 2022-08-24 NOTE — TELEPHONE ENCOUNTER
8/24/2022  10:05 AM        Pt called and would like a call back to go over blood results. OE#[phone]      Thanks,  Violeta Juárez

## 2022-08-25 NOTE — TELEPHONE ENCOUNTER
I attempted to return this call and reached his voice mail. I left a message letting him know that Dr. Ray Cline had sent him a [employer] message and could he go in and read it and reply to her if needed. I also left the number to the [id number] if he needed it to get in.   Roel Rawls

## 2022-09-28 DIAGNOSIS — E03.9 ACQUIRED HYPOTHYROIDISM: ICD-10-CM

## 2022-11-07 DIAGNOSIS — K21.9 GASTROESOPHAGEAL REFLUX DISEASE: ICD-10-CM

## 2022-11-08 RX ORDER — OMEPRAZOLE 20 MG/1
20 CAPSULE, DELAYED RELEASE ORAL DAILY
Qty: 90 CAPSULE | Refills: 0 | Status: SHIPPED | OUTPATIENT
Start: 2022-11-08

## 2022-11-15 NOTE — PROGRESS NOTES
Written by Karo Greer, as dictated by Indira Gallagher MD.   ASSESSMENT and PLAN  Diagnoses and all orders for this visit:    1. Acquired hypothyroidism  Followed by endocrinology. Continue with Unithroid 112mcg daily. Will recheck labs today.         -     TSH 3RD GENERATION; Future    2. Raynaud's phenomenon without gangrene  I encouraged him to wear gloves and use warm compresses at needed. 3. Gastroesophageal reflux disease without esophagitis  Discontinue omeprazole. He will start Pepcid nightly. If he has heartburn he will let me know and we will restart omeprazole. -     famotidine (PEPCID) 40 mg tablet; Take 1 Tablet by mouth     nightly for 30 days. 4. Arthralgia of right temporomandibular joint  I referred him to Dr. Dyllan Garcia (neurology) for further evaluation.   -     REFERRAL TO NEUROLOGY    5. Vitamin D deficiency  I advised him to start Vit D3 1000 units a few times a week. Will check Vit D levels today. -     VITAMIN D, 25 HYDROXY; Future    6. Mixed hyperlipidemia  Cholesterol has been improving. Will recheck lipid panel and CMP today. -     METABOLIC PANEL, COMPREHENSIVE; Future  -     LIPID PANEL; Future    HISTORY OF PRESENT ILLNESS  Nicko Perez is a 45 y.o. male. Here today for routine follow up. Pt's TSH level was 1.290 on 8/16/22. He is on Unithroid 112mcg daily. Pt went to see endocrinologist, Dr. Carey Frye. Starting brand name instead of generic medication has made a difference for the better. Endocrinologist advised him to follow up with a rheumatologist for arthritis, but he has already been evaluated by a rheumatologist.     He went to PT for TMJ which helped, but he continues to feel popping in outer right ear. He saw dentist who fitted him with a mouth guard, but he doesn't like to wear it. Pt stopped taking omeprazole due to concern for side effects.      Patient Active Problem List   Diagnosis Code    Acquired hypothyroidism E03.9    GERD (gastroesophageal reflux disease) K21.9    History of Helicobacter pylori infection Z86.19    Tubular adenoma of colon D12.6    Arthralgia of right temporomandibular joint M26.621        Current Outpatient Medications on File Prior to Visit   Medication Sig Dispense Refill    omeprazole (PRILOSEC) 20 mg capsule Take 1 Capsule by mouth daily. 90 Capsule 0    Unithroid 112 mcg tablet Take one tablet daily on an empty stomach 30 Tablet 2    cyanocobalamin, vitamin B-12, (VITAMIN B-12 PO) Take  by mouth. No current facility-administered medications on file prior to visit.        No Known Allergies    Past Medical History:   Diagnosis Date    Acquired hypothyroidism 4/10/2017    GERD (gastroesophageal reflux disease)     Helicobacter pylori gastritis 03/2018       Past Surgical History:   Procedure Laterality Date    HX COLONOSCOPY  03/2018    Dr. Santos Contreras, repeat 10 years    HX ENDOSCOPY  03/2018    Dr. Santos Contreras       Family History   Problem Relation Age of Onset    Thyroid Disease Mother     Elevated Lipids Mother     No Known Problems Father        Social History     Socioeconomic History    Marital status:      Spouse name: Not on file    Number of children: Not on file    Years of education: Not on file    Highest education level: Not on file   Occupational History    Not on file   Tobacco Use    Smoking status: Never    Smokeless tobacco: Never   Vaping Use    Vaping Use: Never used   Substance and Sexual Activity    Alcohol use: No    Drug use: No    Sexual activity: Yes     Partners: Female     Birth control/protection: Condom   Other Topics Concern    Not on file   Social History Narrative    Not on file     Social Determinants of Health     Financial Resource Strain: Not on file   Food Insecurity: Not on file   Transportation Needs: Not on file   Physical Activity: Not on file   Stress: Not on file   Social Connections: Not on file   Intimate Partner Violence: Not on file   Housing Stability: Not on file       No visits with results within 3 Month(s) from this visit. Latest known visit with results is:   Office Visit on 08/16/2022   Component Date Value Ref Range Status    TSH 08/16/2022 1.290  0.450 - 4.500 uIU/mL Final       Review of Systems   Constitutional:  Negative for malaise/fatigue and weight loss. HENT:  Negative for congestion and sore throat. Eyes:  Negative for blurred vision. Respiratory:  Negative for cough and shortness of breath. Cardiovascular:  Negative for chest pain and leg swelling. Gastrointestinal:  Negative for constipation and heartburn. Genitourinary:  Negative for frequency and urgency. Musculoskeletal:  Negative for back pain, joint pain and myalgias. Neurological:  Negative for dizziness and headaches. Psychiatric/Behavioral:  Negative for depression. The patient is not nervous/anxious and does not have insomnia. Visit Vitals  /82 (BP 1 Location: Left upper arm)   Pulse 93   Temp 97.5 °F (36.4 °C)   Resp 16   Ht 5' 10\" (1.778 m)   Wt 150 lb (68 kg)   SpO2 97%   BMI 21.52 kg/m²       Physical Exam  Vitals and nursing note reviewed. Constitutional:       General: He is not in acute distress. Appearance: Normal appearance. He is not diaphoretic. HENT:      Right Ear: External ear normal.      Left Ear: External ear normal.   Eyes:      General: No scleral icterus. Right eye: No discharge. Left eye: No discharge. Extraocular Movements: Extraocular movements intact. Conjunctiva/sclera: Conjunctivae normal.   Cardiovascular:      Rate and Rhythm: Normal rate and regular rhythm. Pulmonary:      Effort: Pulmonary effort is normal.      Breath sounds: Normal breath sounds. No wheezing. Abdominal:      General: Bowel sounds are normal.      Palpations: Abdomen is soft. Musculoskeletal:      Cervical back: Normal range of motion and neck supple. Lymphadenopathy:      Cervical: No cervical adenopathy.    Neurological: Mental Status: He is alert and oriented to person, place, and time.    Psychiatric:         Mood and Affect: Mood and affect normal.

## 2022-11-16 ENCOUNTER — OFFICE VISIT (OUTPATIENT)
Dept: PRIMARY CARE CLINIC | Age: 52
End: 2022-11-16
Payer: COMMERCIAL

## 2022-11-16 VITALS
DIASTOLIC BLOOD PRESSURE: 82 MMHG | OXYGEN SATURATION: 97 % | WEIGHT: 150 LBS | SYSTOLIC BLOOD PRESSURE: 117 MMHG | RESPIRATION RATE: 16 BRPM | TEMPERATURE: 97.5 F | HEIGHT: 70 IN | HEART RATE: 93 BPM | BODY MASS INDEX: 21.47 KG/M2

## 2022-11-16 DIAGNOSIS — I73.00 RAYNAUD'S PHENOMENON WITHOUT GANGRENE: ICD-10-CM

## 2022-11-16 DIAGNOSIS — E78.2 MIXED HYPERLIPIDEMIA: ICD-10-CM

## 2022-11-16 DIAGNOSIS — K21.9 GASTROESOPHAGEAL REFLUX DISEASE WITHOUT ESOPHAGITIS: ICD-10-CM

## 2022-11-16 DIAGNOSIS — E55.9 VITAMIN D DEFICIENCY: ICD-10-CM

## 2022-11-16 DIAGNOSIS — M26.621 ARTHRALGIA OF RIGHT TEMPOROMANDIBULAR JOINT: ICD-10-CM

## 2022-11-16 DIAGNOSIS — E03.9 ACQUIRED HYPOTHYROIDISM: Primary | ICD-10-CM

## 2022-11-16 PROCEDURE — 99214 OFFICE O/P EST MOD 30 MIN: CPT | Performed by: INTERNAL MEDICINE

## 2022-11-16 RX ORDER — FAMOTIDINE 40 MG/1
40 TABLET, FILM COATED ORAL
Qty: 30 TABLET | Refills: 1 | Status: SHIPPED | OUTPATIENT
Start: 2022-11-16 | End: 2022-12-16

## 2022-11-16 NOTE — PROGRESS NOTES
Chief Complaint   Patient presents with    Follow-up    Medication Evaluation       Visit Vitals  /82 (BP 1 Location: Left upper arm)   Pulse 93   Temp 97.5 °F (36.4 °C)   Resp 16   Ht 5' 10\" (1.778 m)   Wt 150 lb (68 kg)   SpO2 97%   BMI 21.52 kg/m²       1. Have you been to the ER, urgent care clinic since your last visit? Hospitalized since your last visit? Yes [facility]    2. Have you seen or consulted any other health care providers outside of the [address] [place] [address] since your last visit? Include any pap smears or colon screening. Yes PT [facility]      3. For patients aged 39-70: Has the patient had a colonoscopy / FIT/ Cologuard? Yes - Care Gap present.  Most recent result on file

## 2022-12-01 ENCOUNTER — TELEPHONE (OUTPATIENT)
Dept: ENDOCRINOLOGY | Age: 52
End: 2022-12-01

## 2022-12-01 NOTE — TELEPHONE ENCOUNTER
12/1/2022  1:18 PM    Pt called and left message at 1.12 stating that he needs refill on his unithyroid. He stated that his pharmacy had sent over a request but no response.  Please give him a call back at [phone]

## 2022-12-02 NOTE — TELEPHONE ENCOUNTER
Sent My Chart message that we changed his dose and he was to follow up in 2 months and has been over 3 months so needs appt

## 2022-12-05 ENCOUNTER — TELEPHONE (OUTPATIENT)
Dept: PRIMARY CARE CLINIC | Age: 52
End: 2022-12-05

## 2022-12-05 DIAGNOSIS — E03.9 ACQUIRED HYPOTHYROIDISM: ICD-10-CM

## 2022-12-05 RX ORDER — LEVOTHYROXINE SODIUM 112 UG/1
TABLET ORAL
Qty: 90 TABLET | Refills: 1 | Status: SHIPPED | OUTPATIENT
Start: 2022-12-05

## 2022-12-10 DIAGNOSIS — K21.9 GASTROESOPHAGEAL REFLUX DISEASE WITHOUT ESOPHAGITIS: ICD-10-CM

## 2022-12-10 RX ORDER — FAMOTIDINE 40 MG/1
TABLET, FILM COATED ORAL
Qty: 30 TABLET | Refills: 1 | Status: SHIPPED | OUTPATIENT
Start: 2022-12-10

## 2022-12-13 ENCOUNTER — OFFICE VISIT (OUTPATIENT)
Dept: PRIMARY CARE CLINIC | Age: 52
End: 2022-12-13
Payer: COMMERCIAL

## 2022-12-13 VITALS
HEIGHT: 70 IN | RESPIRATION RATE: 18 BRPM | DIASTOLIC BLOOD PRESSURE: 68 MMHG | OXYGEN SATURATION: 99 % | TEMPERATURE: 97.8 F | BODY MASS INDEX: 21.47 KG/M2 | SYSTOLIC BLOOD PRESSURE: 122 MMHG | WEIGHT: 150 LBS | HEART RATE: 73 BPM

## 2022-12-13 DIAGNOSIS — E78.2 MIXED HYPERLIPIDEMIA: Primary | ICD-10-CM

## 2022-12-13 DIAGNOSIS — E55.9 VITAMIN D DEFICIENCY: ICD-10-CM

## 2022-12-13 PROCEDURE — 99213 OFFICE O/P EST LOW 20 MIN: CPT | Performed by: INTERNAL MEDICINE

## 2022-12-13 NOTE — PROGRESS NOTES
1. \"Have you been to the ER, urgent care clinic since your last visit? Hospitalized since your last visit? \" No    2. \"Have you seen or consulted any other health care providers outside of the [address] [place] [address] since your last visit? \" No     3. For patients aged 39-70: Has the patient had a colonoscopy / FIT/ Cologuard? Yes - no Care Gap present      .   Chief Complaint   Patient presents with    Form Completion     FMLA

## 2022-12-13 NOTE — PROGRESS NOTES
Pearl Amezcua (: 1970) is a 45 y.o. male, established patient, here for evaluation of the following chief complaint(s):  Form Completion (FMLA)       ASSESSMENT/PLAN:  Below is the assessment and plan developed based on review of pertinent history, physical exam, labs, studies, and medications. 1. Mixed hyperlipidemia  He will try adjusting his diet and does not want to be on any medication at this time. We will check cholesterol again in March. 2. Vitamin D deficiency  Recommend that patient take a Vitamin D supplement of 2,000 units daily. Intermittent FMLA paperwok completed during an appointment. SUBJECTIVE/OBJECTIVE:  HPI  Patient presents today for an office visit. He is present with FMLA paperwork. He states that he has been feeling fine. His cholesterol was 224 and vitamin D was on 18.9 on 22. He sees an Endocrinologist for thyroid issues and PT on a regular basis. Patient Active Problem List   Diagnosis Code    Acquired hypothyroidism E03.9    GERD (gastroesophageal reflux disease) K21.9    History of Helicobacter pylori infection Z86.19    Tubular adenoma of colon D12.6    Arthralgia of right temporomandibular joint M26.621        Current Outpatient Medications on File Prior to Visit   Medication Sig Dispense Refill    cholecalciferol, vitamin D3, (VITAMIN D3 PO) Take  by mouth. famotidine (PEPCID) 40 mg tablet TAKE 1 TABLET BY MOUTH EVERY DAY NIGHTLY 30 Tablet 1    cyanocobalamin, vitamin B-12, (VITAMIN B-12 PO) Take  by mouth. Unithroid 112 mcg tablet TAKE ONE TABLET DAILY ON AN EMPTY STOMACH 90 Tablet 1    omeprazole (PRILOSEC) 20 mg capsule Take 1 Capsule by mouth daily. (Patient not taking: Reported on 2022) 90 Capsule 0     No current facility-administered medications on file prior to visit.        No Known Allergies    Past Medical History:   Diagnosis Date    Acquired hypothyroidism 4/10/2017    GERD (gastroesophageal reflux disease) Helicobacter pylori gastritis 03/2018       Past Surgical History:   Procedure Laterality Date    HX COLONOSCOPY  03/2018    Dr. Rosezella Halsted, repeat 10 years    HX ENDOSCOPY  03/2018    Dr. Rosezella Halsted       Family History   Problem Relation Age of Onset    Thyroid Disease Mother     Elevated Lipids Mother     No Known Problems Father        Social History     Socioeconomic History    Marital status:      Spouse name: Not on file    Number of children: Not on file    Years of education: Not on file    Highest education level: Not on file   Occupational History    Not on file   Tobacco Use    Smoking status: Never    Smokeless tobacco: Never   Vaping Use    Vaping Use: Never used   Substance and Sexual Activity    Alcohol use: No    Drug use: No    Sexual activity: Yes     Partners: Female     Birth control/protection: Condom   Other Topics Concern    Not on file   Social History Narrative    Not on file     Social Determinants of Health     Financial Resource Strain: Not on file   Food Insecurity: Not on file   Transportation Needs: Not on file   Physical Activity: Not on file   Stress: Not on file   Social Connections: Not on file   Intimate Partner Violence: Not on file   Housing Stability: Not on file       Orders Only on 11/30/2022   Component Date Value Ref Range Status    TSH 11/30/2022 2.75  0.36 - 3.74 uIU/mL Final    Comment:   Due to TSH heterogeneity, both structurally and degree of glycosylation, monoclonal antibodies used in the TSH assay may not accurately quantitate TSH. Therefore, this result should be correlated with clinical findings as well as with other assessments of thyroid function, e.g., free T4, free T3.       Vitamin D 25-Hydroxy 11/30/2022 18.9 (A)  30 - 100 ng/mL Final    Comment: (NOTE)  Deficiency               <20 ng/mL  Insufficiency          20-30 ng/mL  Sufficient             ng/mL  Possible toxicity       >100 ng/mL    The Method used is [facility] currently standardized to a   Center of Disease Control and Prevention (CDC) certified reference   [address]. Samples containing fluorescein dye can produce falsely   elevated values when tested with the [employer] Centaur Vitamin D Assay. It is recommended that results in the toxic range, >100 ng/mL, be   retested 72 hours post fluorescein exposure. Cholesterol, total 11/30/2022 224 (A)  <200 MG/DL Final    Triglyceride 11/30/2022 97  <150 MG/DL Final    Based on NCEP-ATP III:  Triglycerides <150 mg/dL  is considered normal, 150-199 mg/dL  borderline high,  200-499 mg/dL high and  greater than or equal to 500 mg/dL very high. HDL Cholesterol 11/30/2022 63  MG/DL Final    Based on NCEP ATP III, HDL Cholesterol <40 mg/dL is considered low and >60 mg/dL is elevated. LDL, calculated 11/30/2022 141.6 (A)  0 - 100 MG/DL Final    Comment: Based on the NCEP-ATP: LDL-C concentrations are considered  optimal <100 mg/dL,  near optimal/above Normal 100-129 mg/dL  Borderline High: 130-159, High: 160-189 mg/dL  Very High: Greater than or equal to 190 mg/dL      VLDL, calculated 11/30/2022 19.4  MG/DL Final    CHOL/HDL Ratio 11/30/2022 3.6  0.0 - 5.0   Final    Sodium 11/30/2022 143  136 - 145 mmol/L Final    Potassium 11/30/2022 4.6  3.5 - 5.1 mmol/L Final    Chloride 11/30/2022 107  97 - 108 mmol/L Final    CO2 11/30/2022 32  21 - 32 mmol/L Final    Anion gap 11/30/2022 4 (A)  5 - 15 mmol/L Final    Glucose 11/30/2022 96  65 - 100 mg/dL Final    BUN 11/30/2022 16  6 - 20 MG/DL Final    Creatinine 11/30/2022 0.89  0.70 - 1.30 MG/DL Final    BUN/Creatinine ratio 11/30/2022 18  12 - 20   Final    eGFR 11/30/2022 >60  >60 ml/min/1.73m2 Final    Comment:   Pediatric calculator link: Luis Felipe.at. org/professionals/kdoqi/gfr_calculatorped    Effective Oct 3, 2022    These results are not intended for use in patients <25years of age. eGFR results are calculated without a race factor using  the 2021 CKD-EPI equation.  Careful clinical correlation is recommended, particularly when comparing to results calculated using previous equations. The CKD-EPI equation is less accurate in patients with extremes of muscle mass, extra-renal metabolism of creatinine, excessive creatine ingestion, or following therapy that affects renal tubular secretion. Calcium 11/30/2022 9.3  8.5 - 10.1 MG/DL Final    Bilirubin, total 11/30/2022 0.7  0.2 - 1.0 MG/DL Final    ALT (SGPT) 11/30/2022 37  12 - 78 U/L Final    AST (SGOT) 11/30/2022 19  15 - 37 U/L Final    Alk. phosphatase 11/30/2022 95  45 - 117 U/L Final    Protein, total 11/30/2022 7.0  6.4 - 8.2 g/dL Final    Albumin 11/30/2022 4.1  3.5 - 5.0 g/dL Final    Globulin 11/30/2022 2.9  2.0 - 4.0 g/dL Final    A-G Ratio 11/30/2022 1.4  1.1 - 2.2   Final       Review of Systems   Constitutional:  Negative for activity change, fatigue and unexpected weight change. HENT:  Negative for congestion, ear discharge, ear pain, hearing loss, rhinorrhea and sore throat. Eyes:  Negative for pain, discharge and redness. Respiratory:  Negative for cough, chest tightness and shortness of breath. Cardiovascular:  Negative for leg swelling. Gastrointestinal:  Negative for abdominal pain, constipation and diarrhea. Endocrine: Negative for polyuria. Genitourinary:  Negative for dysuria, flank pain and urgency. Musculoskeletal:  Negative for arthralgias, back pain and myalgias. Skin:  Negative for color change. Neurological:  Negative for dizziness, light-headedness and headaches. Psychiatric/Behavioral:  Negative for dysphoric mood and sleep disturbance. The patient is not nervous/anxious. Visit Vitals  /68 (BP 1 Location: Left upper arm, BP Patient Position: Sitting)   Pulse 73   Temp 97.8 °F (36.6 °C) (Temporal)   Resp 18   Ht 5' 10\" (1.778 m)   Wt 150 lb (68 kg)   SpO2 99%   BMI 21.52 kg/m²       Physical Exam  Vitals and nursing note reviewed.    Constitutional:       General: He is not in acute distress. Appearance: Normal appearance. He is well-developed. He is not diaphoretic. HENT:      Head: Normocephalic and atraumatic. Right Ear: External ear normal.      Left Ear: External ear normal.      Mouth/Throat:      Mouth: Mucous membranes are moist.      Pharynx: Oropharynx is clear. Eyes:      General: No scleral icterus. Right eye: No discharge. Left eye: No discharge. Extraocular Movements: Extraocular movements intact. Conjunctiva/sclera: Conjunctivae normal.      Pupils: Pupils are equal, round, and reactive to light. Cardiovascular:      Rate and Rhythm: Normal rate and regular rhythm. Pulses: Normal pulses. Heart sounds: Normal heart sounds. Pulmonary:      Effort: Pulmonary effort is normal.      Breath sounds: Normal breath sounds. No wheezing. Musculoskeletal:      Right lower leg: No edema. Left lower leg: No edema. Neurological:      Mental Status: He is alert and oriented to person, place, and time. Psychiatric:         Mood and Affect: Mood and affect normal.         I, Joel Gutiérrez MD, personally performed the services described in this documentation as scribed by Shiloh Miller in my presence, and it is both accurate and complete. An electronic signature was used to authenticate this note.   -- Shiloh Miller

## 2023-01-05 DIAGNOSIS — K21.9 GASTROESOPHAGEAL REFLUX DISEASE WITHOUT ESOPHAGITIS: ICD-10-CM

## 2023-01-05 RX ORDER — FAMOTIDINE 40 MG/1
TABLET, FILM COATED ORAL
Qty: 30 TABLET | Refills: 1 | Status: SHIPPED | OUTPATIENT
Start: 2023-01-05

## 2023-01-10 ENCOUNTER — PATIENT MESSAGE (OUTPATIENT)
Dept: PRIMARY CARE CLINIC | Age: 53
End: 2023-01-10

## 2023-01-10 DIAGNOSIS — R68.84 CHRONIC JAW PAIN: Primary | ICD-10-CM

## 2023-01-10 DIAGNOSIS — G89.29 CHRONIC JAW PAIN: Primary | ICD-10-CM

## 2023-01-18 ENCOUNTER — TRANSCRIBE ORDER (OUTPATIENT)
Dept: SCHEDULING | Age: 53
End: 2023-01-18

## 2023-01-19 NOTE — TELEPHONE ENCOUNTER
Called imaging downstairs from our office and asked if an Vonzell Bongo was there and that I was calling to ask about a CT order for a pt. The lady said no Vonzell Bongo works there and that they don't handle the diagnoses codes. I said ok, thank you. I called pt and he said he spoke to an Flozell Rodriguez at the number Dr. Omar Benz gave him, [phone]. He said when he spoke to Damien Frias that she was looking at the order and thought everything was fine but then said that the Doctor needs to order 2 different CT scans? That is all the pt knew. I told him I called imaging and there was no Vonzell Bongo but I will call central scheduling. He said ok, thank you. I called [phone] and listed to the options and pressed 2. I spoke to a Tarun Piedra who did not see a note in the pts chart but there is an Vonzell Bongo that works there. She messaged her but DashLuxedeandra was on her way back from lunch and she doesn't recall but will call me back. I left my name and our office number.

## 2023-01-19 NOTE — TELEPHONE ENCOUNTER
----- Message from Tanvir Levi sent at 1/19/2023 11:41 AM EST -----  Subject: Referral Request    Reason for referral request? Pt called in to say the CT referral was wrong   per [facility] in imaging. Please check and resubmit referral so pt can be   scanned. Pt contact [phone]  Provider patient wants to be referred to(if known):     Provider Phone Number(if known):     Additional Information for Provider?   ---------------------------------------------------------------------------  --------------  [id number]    [phone]; OK to leave message on voicemail  ---------------------------------------------------------------------------  --------------

## 2023-01-25 ENCOUNTER — TRANSCRIBE ORDER (OUTPATIENT)
Dept: SCHEDULING | Age: 53
End: 2023-01-25

## 2023-01-27 DIAGNOSIS — R68.84 MANDIBULAR PAIN: Primary | ICD-10-CM

## 2023-02-02 ENCOUNTER — HOSPITAL ENCOUNTER (OUTPATIENT)
Dept: CT IMAGING | Age: 53
Discharge: HOME | End: 2023-02-02
Attending: INTERNAL MEDICINE
Payer: COMMERCIAL

## 2023-02-02 DIAGNOSIS — R68.84 MANDIBULAR PAIN: ICD-10-CM

## 2023-02-02 PROCEDURE — 70491 CT SOFT TISSUE NECK W/DYE: CPT

## 2023-02-02 PROCEDURE — 74011000636 HC RX REV CODE- 636: Performed by: INTERNAL MEDICINE

## 2023-02-02 RX ADMIN — IOPAMIDOL 100 ML: 755 INJECTION, SOLUTION INTRAVENOUS at 13:57

## 2023-02-05 NOTE — PROGRESS NOTES
Results reviewed. Release via [employer], your CT scan report is back and it showed arthritis in the bone. You can discuss this report with ENT as well.

## 2023-02-07 DIAGNOSIS — K21.9 GASTROESOPHAGEAL REFLUX DISEASE WITHOUT ESOPHAGITIS: ICD-10-CM

## 2023-02-07 RX ORDER — FAMOTIDINE 40 MG/1
TABLET, FILM COATED ORAL
Qty: 30 TABLET | Refills: 1 | Status: SHIPPED | OUTPATIENT
Start: 2023-02-07

## 2023-03-13 ENCOUNTER — TELEPHONE (OUTPATIENT)
Dept: ENDOCRINOLOGY | Age: 53
End: 2023-03-13

## 2023-03-14 RX ORDER — LEVOTHYROXINE SODIUM 112 UG/1
112 TABLET ORAL
Qty: 60 TABLET | Refills: 0 | Status: SHIPPED | OUTPATIENT
Start: 2023-03-14

## 2023-04-03 DIAGNOSIS — K21.9 GASTROESOPHAGEAL REFLUX DISEASE WITHOUT ESOPHAGITIS: ICD-10-CM

## 2023-04-03 RX ORDER — FAMOTIDINE 40 MG/1
TABLET, FILM COATED ORAL
Qty: 30 TABLET | Refills: 1 | Status: SHIPPED | OUTPATIENT
Start: 2023-04-03

## 2023-05-07 DIAGNOSIS — K21.9 GASTRO-ESOPHAGEAL REFLUX DISEASE WITHOUT ESOPHAGITIS: ICD-10-CM

## 2023-05-08 RX ORDER — FAMOTIDINE 40 MG/1
TABLET, FILM COATED ORAL
Qty: 30 TABLET | Refills: 1 | Status: SHIPPED | OUTPATIENT
Start: 2023-05-08

## 2023-05-09 RX ORDER — LEVOTHYROXINE SODIUM 112 UG/1
TABLET ORAL
Qty: 90 TABLET | Refills: 0 | Status: SHIPPED | OUTPATIENT
Start: 2023-05-09

## 2023-05-17 RX ORDER — OMEPRAZOLE 20 MG/1
20 CAPSULE, DELAYED RELEASE ORAL DAILY
COMMUNITY
Start: 2022-11-08

## 2023-05-24 NOTE — PROGRESS NOTES
Chief Complaint   Patient presents with    Fever     states that mother was very sick and contagious and he saw her.  states that he has been having some fever and last niht had blood in his stool [facility] ENCOUNTER  FAMILY MEDICINE OFFICE VISIT    CHIEF COMPLAINT:  Urinary Frequency       HISTORY OF PRESENT ILLNESS:    Maria Del Carmen Salazar is a pleasant 89 year old female who presents today for:    • Urinary Frequency      Concern: urinary frequency  Started: 2 years   Onset: Gradual.    Symptoms include urinary frequency, mild burning started recently, getting up 1-2 times at night; no back pain, no fever, no nausea or vomiting.  Denies: abdominal pain, fever  Pain level is none  Symptoms occurring daily  Aggravated by nothing  Alleviated by nothing  Additional comments/concerns include hx of overactive bladder, she is taking Myrbetriq 25 mg but doesn't feel it is helping. Failed oxybutynin already.    Her BP is elevated still. She does not have any side effects.         PROBLEM LIST:    Patient Active Problem List   Diagnosis   • Essential hypertension   • Other dyspnea and respiratory abnormality   • Sensorineural hearing loss, unspecified   • Postsurgical aortocoronary bypass status   • Coronary artery disease involving native coronary artery of native heart without angina pectoris   • Other hyperlipidemia   • Closed fracture of unspecified part of upper end of humerus   • Malignant neoplasm of sigmoid colon (CMD)   • Overactive bladder       HISTORIES:    I have reviewed the past medical history, family history, social history, medications and allergies listed in the medical record as obtained by my nursing staff and support staff and agree with their documentation.  ALLERGIES:   Allergen Reactions   • Cephalexin DIZZINESS     Current Outpatient Medications   Medication Sig Dispense Refill   • mirabegron ER (MYRBETRIQ) 50 MG 24 hr tablet Take 1 tablet by mouth daily. 90 tablet 1   • sulfamethoxazole-trimethoprim (BACTRIM DS) 800-160 MG per tablet Take 1 tablet by mouth in the morning and 1 tablet in the evening. Do all this for 7 days. 14 tablet 0   • lisinopril (ZESTRIL) 30 MG tablet Take 1 tablet  by mouth daily. 90 tablet 1   • amLODIPine (NORVASC) 5 MG tablet Take 1 tablet by mouth daily. 90 tablet 1   • tolterodine (DETROL) 1 MG tablet Take 1 tablet by mouth in the morning and 1 tablet in the evening. 60 tablet 5   • metoPROLOL tartrate (LOPRESSOR) 50 MG tablet TAKE 1 TABLET BY MOUTH 2 TIMES DAILY. 180 tablet 3   • simvastatin (ZOCOR) 40 MG tablet TAKE 1 TABLET BY MOUTH EVERY DAY AT NIGHT 90 tablet 3   • Rocklatan 0.02-0.005 % Solution INSTILL 1 DROP INTO LEFT EYE AT BEDTIME     • omeprazole (PrilOSEC) 20 MG capsule Take 1 capsule by mouth daily. FOR ACID REFLUX 90 capsule 3   • acetaminophen (TYLENOL) 500 MG tablet Take 1,000 mg by mouth every 4 hours as needed.     • Calcium Carbonate-Vit D-Min (CALCIUM 1200 PO) Take 1 capsule by mouth daily.      • Cholecalciferol (Vitamin D3) 50 mcg (2,000 units) capsule Take 50 mcg by mouth daily.     • hydroCORTisone (CORTIZONE) 2.5 % cream Apply 1 application topically 3 times daily. 30 g 3   • aspirin 81 MG tablet Take 2 tablets by mouth daily.     • Multiple Vitamins-Minerals (PRESERVISION AREDS) CAPS Take 1 capsule by mouth 2 times daily.        No current facility-administered medications for this visit.       REVIEW OF SYSTEMS:    No pertinent positive findings with regards to review of systems other than the information discussed in the HPI.     PHYSICAL EXAM:    Vital Signs:    Visit Vitals  BP (!) 162/60   Pulse 73   Wt 63.2 kg (139 lb 5.3 oz)   BMI 22.49 kg/m²     General:   Alert, cooperative, conversive in no acute distress.  Skin:  Warm and dry without rash.    Head:  Normocephalic, atraumatic.   Neck:  Trachea is midline.     Eyes:  Normal conjunctivae and sclerae. EOMI. No scleral icterus.  ENT:   Moist oral (mouth) mucosa.  Cardiovascular:  Symmetrical pulses.  Regular rate and rhythm without murmur.  Respiratory:   Normal respiratory effort.  Clear to auscultation.  No wheezes, rales or rhonchi.  Musculoskeletal:  No deformity or edema. Normal gait.  Negative CVA tenderness  Back:  Normal alignment.    Neurologic:  Oriented x4.  No focal deficits.  Psychiatric:  Cooperative.  Appropriate mood and affect.    LABORATORY DATA:    If applicable, pertinent labs reviewed.    IMAGING STUDIES:    If applicable, pertinent imaging reviewed.    ASSESSMENT:    1. Increased urinary frequency    2. Overactive bladder    3. Acute cystitis with hematuria    4. Essential hypertension        PLAN:    Orders Placed This Encounter   • Urinalysis With Microscopy & Culture If Indicated   • mirabegron ER (MYRBETRIQ) 50 MG 24 hr tablet   • sulfamethoxazole-trimethoprim (BACTRIM DS) 800-160 MG per tablet   • lisinopril (ZESTRIL) 30 MG tablet   • amLODIPine (NORVASC) 5 MG tablet         DISCUSSION:    1. Increased urinary frequency  - Urinalysis With Microscopy & Culture If Indicated; Future    2. Overactive bladder  - mirabegron ER (MYRBETRIQ) 50 MG 24 hr tablet; Take 1 tablet by mouth daily.  Dispense: 90 tablet; Refill: 1    3. Acute cystitis with hematuria  - sulfamethoxazole-trimethoprim (BACTRIM DS) 800-160 MG per tablet; Take 1 tablet by mouth in the morning and 1 tablet in the evening. Do all this for 7 days.  Dispense: 14 tablet; Refill: 0    4. Essential hypertension  - lisinopril (ZESTRIL) 30 MG tablet; Take 1 tablet by mouth daily.  Dispense: 90 tablet; Refill: 1  - amLODIPine (NORVASC) 5 MG tablet; Take 1 tablet by mouth daily.  Dispense: 90 tablet; Refill: 1        FOLLOW UP:    Return in about 4 weeks (around 6/21/2023) for f/u blood pressure and urine sx.  .    The patient and/or parent/guardian/caregiver/family member/accompanying party indicated understanding of the diagnosis and agreed with the plan of care.

## 2023-05-26 DIAGNOSIS — K21.9 GASTRO-ESOPHAGEAL REFLUX DISEASE WITHOUT ESOPHAGITIS: ICD-10-CM

## 2023-05-26 RX ORDER — FAMOTIDINE 40 MG/1
TABLET, FILM COATED ORAL
Qty: 90 TABLET | Refills: 0 | Status: SHIPPED | OUTPATIENT
Start: 2023-05-26

## 2023-06-07 ENCOUNTER — OFFICE VISIT (OUTPATIENT)
Dept: PRIMARY CARE CLINIC | Facility: CLINIC | Age: 53
End: 2023-06-07
Payer: COMMERCIAL

## 2023-06-07 VITALS
TEMPERATURE: 97.1 F | HEIGHT: 70 IN | WEIGHT: 147.6 LBS | HEART RATE: 81 BPM | OXYGEN SATURATION: 98 % | RESPIRATION RATE: 18 BRPM | SYSTOLIC BLOOD PRESSURE: 95 MMHG | DIASTOLIC BLOOD PRESSURE: 62 MMHG | BODY MASS INDEX: 21.13 KG/M2

## 2023-06-07 DIAGNOSIS — K21.9 GASTROESOPHAGEAL REFLUX DISEASE WITHOUT ESOPHAGITIS: ICD-10-CM

## 2023-06-07 DIAGNOSIS — Z00.00 PHYSICAL EXAM: ICD-10-CM

## 2023-06-07 DIAGNOSIS — E55.9 VITAMIN D DEFICIENCY: ICD-10-CM

## 2023-06-07 DIAGNOSIS — E03.9 ACQUIRED HYPOTHYROIDISM: ICD-10-CM

## 2023-06-07 DIAGNOSIS — M26.621 ARTHRALGIA OF RIGHT TEMPOROMANDIBULAR JOINT: ICD-10-CM

## 2023-06-07 DIAGNOSIS — Z00.00 PHYSICAL EXAM: Primary | ICD-10-CM

## 2023-06-07 LAB
25(OH)D3 SERPL-MCNC: 29.2 NG/ML (ref 30–100)
ALBUMIN SERPL-MCNC: 4.2 G/DL (ref 3.5–5)
ALBUMIN/GLOB SERPL: 1.5 (ref 1.1–2.2)
ALP SERPL-CCNC: 84 U/L (ref 45–117)
ALT SERPL-CCNC: 32 U/L (ref 12–78)
ANION GAP SERPL CALC-SCNC: 4 MMOL/L (ref 5–15)
AST SERPL-CCNC: 19 U/L (ref 15–37)
BILIRUB SERPL-MCNC: 0.7 MG/DL (ref 0.2–1)
BUN SERPL-MCNC: 16 MG/DL (ref 6–20)
BUN/CREAT SERPL: 20 (ref 12–20)
CALCIUM SERPL-MCNC: 9.2 MG/DL (ref 8.5–10.1)
CHLORIDE SERPL-SCNC: 106 MMOL/L (ref 97–108)
CHOLEST SERPL-MCNC: 183 MG/DL
CO2 SERPL-SCNC: 31 MMOL/L (ref 21–32)
CREAT SERPL-MCNC: 0.82 MG/DL (ref 0.7–1.3)
ERYTHROCYTE [DISTWIDTH] IN BLOOD BY AUTOMATED COUNT: 12 % (ref 11.5–14.5)
GLOBULIN SER CALC-MCNC: 2.8 G/DL (ref 2–4)
GLUCOSE SERPL-MCNC: 102 MG/DL (ref 65–100)
HCT VFR BLD AUTO: 44.1 % (ref 36.6–50.3)
HDLC SERPL-MCNC: 63 MG/DL
HDLC SERPL: 2.9 (ref 0–5)
HGB BLD-MCNC: 14 G/DL (ref 12.1–17)
LDLC SERPL CALC-MCNC: 106.8 MG/DL (ref 0–100)
MCH RBC QN AUTO: 27.8 PG (ref 26–34)
MCHC RBC AUTO-ENTMCNC: 31.7 G/DL (ref 30–36.5)
MCV RBC AUTO: 87.5 FL (ref 80–99)
NRBC # BLD: 0 K/UL (ref 0–0.01)
NRBC BLD-RTO: 0 PER 100 WBC
PLATELET # BLD AUTO: 255 K/UL (ref 150–400)
PMV BLD AUTO: 9.9 FL (ref 8.9–12.9)
POTASSIUM SERPL-SCNC: 4.2 MMOL/L (ref 3.5–5.1)
PROT SERPL-MCNC: 7 G/DL (ref 6.4–8.2)
PSA SERPL-MCNC: 3.2 NG/ML (ref 0.01–4)
RBC # BLD AUTO: 5.04 M/UL (ref 4.1–5.7)
SODIUM SERPL-SCNC: 141 MMOL/L (ref 136–145)
TRIGL SERPL-MCNC: 66 MG/DL
TSH SERPL DL<=0.05 MIU/L-ACNC: 0.73 UIU/ML (ref 0.36–3.74)
VLDLC SERPL CALC-MCNC: 13.2 MG/DL
WBC # BLD AUTO: 5.8 K/UL (ref 4.1–11.1)

## 2023-06-07 PROCEDURE — 99396 PREV VISIT EST AGE 40-64: CPT | Performed by: INTERNAL MEDICINE

## 2023-06-07 SDOH — ECONOMIC STABILITY: INCOME INSECURITY: HOW HARD IS IT FOR YOU TO PAY FOR THE VERY BASICS LIKE FOOD, HOUSING, MEDICAL CARE, AND HEATING?: PATIENT DECLINED

## 2023-06-07 SDOH — ECONOMIC STABILITY: FOOD INSECURITY: WITHIN THE PAST 12 MONTHS, YOU WORRIED THAT YOUR FOOD WOULD RUN OUT BEFORE YOU GOT MONEY TO BUY MORE.: PATIENT DECLINED

## 2023-06-07 SDOH — ECONOMIC STABILITY: HOUSING INSECURITY
IN THE LAST 12 MONTHS, WAS THERE A TIME WHEN YOU DID NOT HAVE A STEADY PLACE TO SLEEP OR SLEPT IN A SHELTER (INCLUDING NOW)?: NO

## 2023-06-07 SDOH — ECONOMIC STABILITY: FOOD INSECURITY: WITHIN THE PAST 12 MONTHS, THE FOOD YOU BOUGHT JUST DIDN'T LAST AND YOU DIDN'T HAVE MONEY TO GET MORE.: PATIENT DECLINED

## 2023-06-07 ASSESSMENT — PATIENT HEALTH QUESTIONNAIRE - PHQ9
1. LITTLE INTEREST OR PLEASURE IN DOING THINGS: 0
SUM OF ALL RESPONSES TO PHQ9 QUESTIONS 1 & 2: 0
SUM OF ALL RESPONSES TO PHQ QUESTIONS 1-9: 0
2. FEELING DOWN, DEPRESSED OR HOPELESS: 0
SUM OF ALL RESPONSES TO PHQ QUESTIONS 1-9: 0

## 2023-06-07 ASSESSMENT — ENCOUNTER SYMPTOMS
EYE DISCHARGE: 0
COUGH: 0
CONSTIPATION: 0
DIARRHEA: 0
BACK PAIN: 0
SHORTNESS OF BREATH: 0
ABDOMINAL PAIN: 0
CHEST TIGHTNESS: 0
SORE THROAT: 0
RHINORRHEA: 0
COLOR CHANGE: 0

## 2023-06-07 NOTE — PROGRESS NOTES
1. \"Have you been to the ER, urgent care clinic since your last visit? Hospitalized since your last visit? \" No    2. \"Have you seen or consulted any other health care providers outside of the [address] [place] [address] since your last visit? \" ENT    3. For patients aged 39-70: Has the patient had a colonoscopy / FIT/ Cologuard? Yes - no Care Gap present      Chief Complaint   Patient presents with    Annual Exam       Pt is ok with scribe.
occasional urinary frequency. Patient Active Problem List   Diagnosis    Acquired hypothyroidism    Arthralgia of right temporomandibular joint    History of Helicobacter pylori infection    Tubular adenoma of colon    GERD (gastroesophageal reflux disease)        Current Outpatient Medications on File Prior to Visit   Medication Sig Dispense Refill    VITAMIN D PO Take by mouth      Cyanocobalamin (VITAMIN B-12 PO) Take by mouth      famotidine (PEPCID) 40 MG tablet TAKE 1 TABLET BY MOUTH EVERY DAY NIGHTLY 90 tablet 0    UNITHROID 112 MCG tablet TAKE 1 TABLET BY MOUTH EVERY DAY BEFORE BREAKFAST 90 tablet 0    omeprazole (PRILOSEC) 20 MG delayed release capsule Take 1 capsule by mouth daily (Patient not taking: Reported on 6/7/2023)       No current facility-administered medications on file prior to visit.        No Known Allergies    Past Medical History:   Diagnosis Date    Acquired hypothyroidism 4/10/2017    GERD (gastroesophageal reflux disease)     Helicobacter pylori gastritis 03/2018       Past Surgical History:   Procedure Laterality Date    COLONOSCOPY  03/2018    Dr. Dale Palmer, repeat 10 years    UPPER GASTROINTESTINAL ENDOSCOPY  03/2018    Dr. Dale Palmer       Family History   Problem Relation Age of Onset    Elevated Lipids Mother     No Known Problems Father     Thyroid Disease Mother        Social History     Socioeconomic History    Marital status:      Spouse name: Not on file    Number of children: Not on file    Years of education: Not on file    Highest education level: Not on file   Occupational History    Not on file   Tobacco Use    Smoking status: Never    Smokeless tobacco: Never   Substance and Sexual Activity    Alcohol use: No    Drug use: No    Sexual activity: Not on file   Other Topics Concern    Not on file   Social History Narrative    Not on file     Social Determinants of Health     Financial Resource Strain: Unknown    Difficulty of Paying Living Expenses: Patient refused   Food

## 2023-08-26 DIAGNOSIS — K21.9 GASTRO-ESOPHAGEAL REFLUX DISEASE WITHOUT ESOPHAGITIS: ICD-10-CM

## 2023-08-26 RX ORDER — FAMOTIDINE 40 MG/1
TABLET, FILM COATED ORAL
Qty: 90 TABLET | Refills: 0 | Status: SHIPPED | OUTPATIENT
Start: 2023-08-26

## 2023-08-27 RX ORDER — LEVOTHYROXINE SODIUM 112 UG/1
TABLET ORAL
Qty: 90 TABLET | Refills: 0 | Status: SHIPPED | OUTPATIENT
Start: 2023-08-27

## 2023-10-05 ENCOUNTER — HOSPITAL ENCOUNTER (EMERGENCY)
Facility: HOSPITAL | Age: 53
Discharge: HOME | End: 2023-10-05
Attending: STUDENT IN AN ORGANIZED HEALTH CARE EDUCATION/TRAINING PROGRAM
Payer: COMMERCIAL

## 2023-10-05 VITALS
DIASTOLIC BLOOD PRESSURE: 81 MMHG | SYSTOLIC BLOOD PRESSURE: 120 MMHG | WEIGHT: 149.69 LBS | HEART RATE: 80 BPM | TEMPERATURE: 97.8 F | OXYGEN SATURATION: 98 % | BODY MASS INDEX: 21.48 KG/M2 | RESPIRATION RATE: 14 BRPM

## 2023-10-05 DIAGNOSIS — R20.9 SKIN SENSATION DISTURBANCE: Primary | ICD-10-CM

## 2023-10-05 LAB
ALBUMIN SERPL-MCNC: 4.1 G/DL (ref 3.5–5)
ALBUMIN/GLOB SERPL: 1 (ref 1.1–2.2)
ALP SERPL-CCNC: 100 U/L (ref 45–117)
ALT SERPL-CCNC: 31 U/L (ref 12–78)
ANION GAP SERPL CALC-SCNC: 8 MMOL/L (ref 5–15)
APPEARANCE UR: CLEAR
AST SERPL-CCNC: 46 U/L (ref 15–37)
BACTERIA URNS QL MICRO: NEGATIVE /HPF
BASOPHILS # BLD: 0.1 K/UL (ref 0–0.1)
BASOPHILS NFR BLD: 1 % (ref 0–1)
BILIRUB SERPL-MCNC: 0.4 MG/DL (ref 0.2–1)
BILIRUB UR QL: NEGATIVE
BUN SERPL-MCNC: 18 MG/DL (ref 6–20)
BUN/CREAT SERPL: 21 (ref 12–20)
CALCIUM SERPL-MCNC: 9.5 MG/DL (ref 8.5–10.1)
CHLORIDE SERPL-SCNC: 102 MMOL/L (ref 97–108)
CO2 SERPL-SCNC: 28 MMOL/L (ref 21–32)
COLOR UR: NORMAL
CREAT SERPL-MCNC: 0.85 MG/DL (ref 0.7–1.3)
DIFFERENTIAL METHOD BLD: NORMAL
EOSINOPHIL # BLD: 0.1 K/UL (ref 0–0.4)
EOSINOPHIL NFR BLD: 2 % (ref 0–7)
EPITH CASTS URNS QL MICRO: NORMAL /LPF
ERYTHROCYTE [DISTWIDTH] IN BLOOD BY AUTOMATED COUNT: 12.1 % (ref 11.5–14.5)
GLOBULIN SER CALC-MCNC: 4 G/DL (ref 2–4)
GLUCOSE SERPL-MCNC: 107 MG/DL (ref 65–100)
GLUCOSE UR STRIP.AUTO-MCNC: NEGATIVE MG/DL
HCT VFR BLD AUTO: 44.9 % (ref 36.6–50.3)
HGB BLD-MCNC: 14.5 G/DL (ref 12.1–17)
HGB UR QL STRIP: NEGATIVE
IMM GRANULOCYTES # BLD AUTO: 0 K/UL (ref 0–0.04)
IMM GRANULOCYTES NFR BLD AUTO: 0 % (ref 0–0.5)
KETONES UR QL STRIP.AUTO: NEGATIVE MG/DL
LEUKOCYTE ESTERASE UR QL STRIP.AUTO: NEGATIVE
LIPASE SERPL-CCNC: 59 U/L (ref 13–75)
LYMPHOCYTES # BLD: 3.3 K/UL (ref 0.8–3.5)
LYMPHOCYTES NFR BLD: 42 % (ref 12–49)
MCH RBC QN AUTO: 28.3 PG (ref 26–34)
MCHC RBC AUTO-ENTMCNC: 32.3 G/DL (ref 30–36.5)
MCV RBC AUTO: 87.5 FL (ref 80–99)
MONOCYTES # BLD: 0.7 K/UL (ref 0–1)
MONOCYTES NFR BLD: 9 % (ref 5–13)
NEUTS SEG # BLD: 3.6 K/UL (ref 1.8–8)
NEUTS SEG NFR BLD: 46 % (ref 32–75)
NITRITE UR QL STRIP.AUTO: NEGATIVE
NRBC # BLD: 0 K/UL (ref 0–0.01)
NRBC BLD-RTO: 0 PER 100 WBC
PH UR STRIP: 7 (ref 5–8)
PLATELET # BLD AUTO: 275 K/UL (ref 150–400)
PMV BLD AUTO: 9.7 FL (ref 8.9–12.9)
POTASSIUM SERPL-SCNC: 3.4 MMOL/L (ref 3.5–5.1)
POTASSIUM SERPL-SCNC: ABNORMAL MMOL/L (ref 3.5–5.1)
PROT SERPL-MCNC: 8.1 G/DL (ref 6.4–8.2)
PROT UR STRIP-MCNC: NEGATIVE MG/DL
RBC # BLD AUTO: 5.13 M/UL (ref 4.1–5.7)
RBC #/AREA URNS HPF: NORMAL /HPF (ref 0–5)
SODIUM SERPL-SCNC: 138 MMOL/L (ref 136–145)
SP GR UR REFRACTOMETRY: 1.01 (ref 1–1.03)
SPECIMEN HOLD: NORMAL
UROBILINOGEN UR QL STRIP.AUTO: 0.2 EU/DL (ref 0.2–1)
WBC # BLD AUTO: 7.8 K/UL (ref 4.1–11.1)
WBC URNS QL MICRO: NORMAL /HPF (ref 0–4)

## 2023-10-05 PROCEDURE — 99283 EMERGENCY DEPT VISIT LOW MDM: CPT

## 2023-10-05 PROCEDURE — 81001 URINALYSIS AUTO W/SCOPE: CPT

## 2023-10-05 PROCEDURE — 85025 COMPLETE CBC W/AUTO DIFF WBC: CPT

## 2023-10-05 PROCEDURE — 83690 ASSAY OF LIPASE: CPT

## 2023-10-05 PROCEDURE — 36415 COLL VENOUS BLD VENIPUNCTURE: CPT

## 2023-10-05 PROCEDURE — 80053 COMPREHEN METABOLIC PANEL: CPT

## 2023-10-05 PROCEDURE — 84132 ASSAY OF SERUM POTASSIUM: CPT

## 2023-10-05 ASSESSMENT — PAIN DESCRIPTION - ORIENTATION: ORIENTATION: LOWER

## 2023-10-05 ASSESSMENT — PAIN DESCRIPTION - LOCATION: LOCATION: ABDOMEN

## 2023-10-05 ASSESSMENT — PAIN DESCRIPTION - DESCRIPTORS: DESCRIPTORS: ACHING

## 2023-10-05 ASSESSMENT — PAIN - FUNCTIONAL ASSESSMENT: PAIN_FUNCTIONAL_ASSESSMENT: 0-10

## 2023-10-05 ASSESSMENT — PAIN SCALES - GENERAL: PAINLEVEL_OUTOF10: 3

## 2023-10-06 ENCOUNTER — NURSE TRIAGE (OUTPATIENT)
Dept: OTHER | Facility: CLINIC | Age: 53
End: 2023-10-06

## 2023-10-06 ENCOUNTER — TELEPHONE (OUTPATIENT)
Dept: PRIMARY CARE CLINIC | Facility: CLINIC | Age: 53
End: 2023-10-06

## 2023-10-06 NOTE — ED PROVIDER NOTES
[facility] EMERGENCY CTR  EMERGENCY DEPARTMENT ENCOUNTER      Pt Name: Delmus Bumpers  MRN: [id number]  [address] [facility] [place] 1970  Date of evaluation: 10/5/2023  Provider: Dionne Patterson MD    CHIEF COMPLAINT       Chief Complaint   Patient presents with    Abdominal Pain    Back Pain    Chills         HISTORY OF PRESENT ILLNESS   (Location/Symptom, Timing/Onset, Context/Setting, Quality, Duration, Modifying Factors, Severity)  Note limiting factors. 51 yo with a h/o hypothyroidism who presents for months of symptoms of \"cold sensation\" over his arms/legs. Patient reports he notices the sensations mostly when he is exposed to cold weather, he will get very cold and tingly hands and feet. Currently he does not have the sensation. Denies weakness, difficulty standing, dizziness, lightheadedness, headache, blurred vision. No complaints of pain or discomfort. Patient states he been working with his regular physician to have the symptoms evaluated. Patient does take medications for his thyroid. No history of vascular disease. Has been eating and drinking well. Denies fever, chills, nausea, vomiting, diarrhea. No rashes. No recent travel. No new medications. Denies neck pain. The history is provided by the patient. Review of External Medical Records:     Nursing Notes were reviewed. REVIEW OF SYSTEMS    (2-9 systems for level 4, 10 or more for level 5)     Review of Systems    Except as noted above the remainder of the review of systems was reviewed and negative.        PAST MEDICAL HISTORY     Past Medical History:   Diagnosis Date    Acquired hypothyroidism 4/10/2017    GERD (gastroesophageal reflux disease)     Helicobacter pylori gastritis 03/2018         SURGICAL HISTORY       Past Surgical History:   Procedure Laterality Date    COLONOSCOPY  03/2018    Dr. Minda Schilder, repeat 10 years    UPPER GASTROINTESTINAL ENDOSCOPY  03/2018    Dr. Jenise Habermann       Discharge Medication

## 2023-10-06 NOTE — TELEPHONE ENCOUNTER
Patient was seen in the ed yesterday for lower abdominal pain. He wants to know if it has anything to do with his kidney. He wants to be seen earlier than 10/18.

## 2023-10-06 NOTE — ED NOTES
Patient left ED in no acute distress, alert and oriented x4. Patient was encourage to come back if symptoms get worse. Patient was provided with discharge instructions. All questions were answered. Patient left ambulatory.         Fernando Harris RN  10/05/23 3616

## 2023-10-06 NOTE — TELEPHONE ENCOUNTER
Pt seen in the ED yesterday-  Pt does not have worsening symptoms however calling for an ED f/u- pt does have questions about lab work and possibly wants TSH ran. Transferred to [facility] at the [facility] ([facility]) for scheduling    Reason for Disposition   Caller has already spoken with another triager or PCP (or office), and has further questions and triager able to answer questions.     Protocols used: No Contact or Duplicate Contact Call-ADULT-[place]

## 2023-10-06 NOTE — ED TRIAGE NOTES
52year old male pt comes to the ED via POV for a CC Back pain, Lower abdominal pain, chills. Pt states that for the last couple of days he has marli chills, little bit of back pain and lower abdominal pain and wonders if its his kidney. Pt rates his pain a 4 and is A&Ox4.

## 2023-10-10 ENCOUNTER — TELEPHONE (OUTPATIENT)
Dept: PRIMARY CARE CLINIC | Facility: CLINIC | Age: 53
End: 2023-10-10

## 2023-10-10 NOTE — TELEPHONE ENCOUNTER
----- Message from Damaris Bhatt sent at 10/10/2023  9:51 AM EDT -----  Subject: Appointment Request    Reason for Call: Established Patient Appointment needed: Routine ED Follow   Up Visit    QUESTIONS    Reason for appointment request? No appointments available during search     Additional Information for Provider? NEEDS TO RESCHEDULE ED F/U 10/5/23 [facility], FOR ABD & BACK PAIN, CHILLS.  PLEASE CALL TO RESCHEDULE  ---------------------------------------------------------------------------  --------------  Boyd RAMEY  [phone]; OK to leave message on voicemail  ---------------------------------------------------------------------------  --------------  SCRIPT ANSWERS

## 2023-10-10 NOTE — TELEPHONE ENCOUNTER
Called patient left voicemail to call the office back to rescheduled ED follow up did stated no appointment available until next week or the week after.

## 2023-10-24 ENCOUNTER — OFFICE VISIT (OUTPATIENT)
Dept: PRIMARY CARE CLINIC | Facility: CLINIC | Age: 53
End: 2023-10-24
Payer: COMMERCIAL

## 2023-10-24 VITALS
RESPIRATION RATE: 16 BRPM | DIASTOLIC BLOOD PRESSURE: 64 MMHG | WEIGHT: 147 LBS | BODY MASS INDEX: 21.05 KG/M2 | OXYGEN SATURATION: 99 % | TEMPERATURE: 97.1 F | SYSTOLIC BLOOD PRESSURE: 107 MMHG | HEART RATE: 79 BPM | HEIGHT: 70 IN

## 2023-10-24 DIAGNOSIS — E87.6 HYPOKALEMIA: ICD-10-CM

## 2023-10-24 DIAGNOSIS — M47.812 CERVICAL ARTHRITIS: ICD-10-CM

## 2023-10-24 DIAGNOSIS — R20.9 SENSATION OF COLD IN LEG: ICD-10-CM

## 2023-10-24 DIAGNOSIS — E03.9 ACQUIRED HYPOTHYROIDISM: Primary | ICD-10-CM

## 2023-10-24 PROCEDURE — 99213 OFFICE O/P EST LOW 20 MIN: CPT | Performed by: INTERNAL MEDICINE

## 2023-10-24 RX ORDER — POTASSIUM CHLORIDE 750 MG/1
10 TABLET, EXTENDED RELEASE ORAL DAILY
Qty: 30 TABLET | Refills: 0 | Status: SHIPPED | OUTPATIENT
Start: 2023-10-24 | End: 2023-11-23

## 2023-10-24 ASSESSMENT — ENCOUNTER SYMPTOMS
SHORTNESS OF BREATH: 0
EYE DISCHARGE: 0
CONSTIPATION: 0
DIARRHEA: 0
RHINORRHEA: 0
ABDOMINAL PAIN: 0
COUGH: 0
BACK PAIN: 0
SORE THROAT: 0
CHEST TIGHTNESS: 0
COLOR CHANGE: 0

## 2023-10-24 ASSESSMENT — PATIENT HEALTH QUESTIONNAIRE - PHQ9
SUM OF ALL RESPONSES TO PHQ9 QUESTIONS 1 & 2: 0
SUM OF ALL RESPONSES TO PHQ QUESTIONS 1-9: 0
2. FEELING DOWN, DEPRESSED OR HOPELESS: 0
1. LITTLE INTEREST OR PLEASURE IN DOING THINGS: 0
SUM OF ALL RESPONSES TO PHQ QUESTIONS 1-9: 0

## 2023-10-24 NOTE — PROGRESS NOTES
Shilpi Elliott (:  1970) is a 52 y.o. male, Established patient, here for evaluation of the following chief complaint(s):  Follow-up (Went to ER for body chills and usually has cold feet and hands but was extreme. Back and abdomen pain as well.)         ASSESSMENT/PLAN:  1. Acquired hypothyroidism  He is followed by Endocrinology and has an appointment next week. I recommend that he continue taking Unithroid 112 mcg daily. 2. Hypokalemia  Labs done in the ER reviewed. -     potassium chloride (KLOR-CON M) 10 MEQ extended release tablet; Take 1 tablet by mouth daily, Disp-30 tablet, R-0Normal sent to pharmacy. I prescribed a potasium supplement. Potential side effects were discussed. Should take every other day. 3. Sensation of cold in leg  He should discuss these symptoms with Endocrinology. I recommend that he take a magnesium supplement 200-500 mg daily. 4. Cervical arthritis  He is followed by Orthopedics. Subjective   SUBJECTIVE/OBJECTIVE:  HPI    Patient presents today for a hospital follow up. He went to the ER due to a flare up of cold hand, legs, and face. He was also having back pain. He gave a urine sample which was normal.  He had some blood drawn which indicated his potassium was low (3.40. Today, he has a cold sensation in his legs, but he denies leg cramps. He went to [facility] yesterday and her was diagnosed with arthritis in his neck. He was prescribed steroids due to occasional numbness and tingling in his hands. He was also referred to PT. He has an appointment with Endocrinology next week. He takes Unithroid 112 mcg daily. He takes famotidine 40 mg nightly.     Patient Active Problem List   Diagnosis    Acquired hypothyroidism    Arthralgia of right temporomandibular joint    History of Helicobacter pylori infection    Tubular adenoma of colon    GERD (gastroesophageal reflux disease)        Current Outpatient Medications on File Prior to Visit

## 2023-11-29 DIAGNOSIS — E87.6 HYPOKALEMIA: ICD-10-CM

## 2023-11-29 RX ORDER — POTASSIUM CHLORIDE 750 MG/1
10 TABLET, EXTENDED RELEASE ORAL DAILY
Qty: 30 TABLET | Refills: 0 | Status: SHIPPED | OUTPATIENT
Start: 2023-11-29 | End: 2023-12-29

## 2023-12-15 DIAGNOSIS — R09.89 POOR CIRCULATION OF EXTREMITY: Primary | ICD-10-CM

## 2023-12-27 RX ORDER — LEVOTHYROXINE SODIUM 112 UG/1
TABLET ORAL
Qty: 90 TABLET | Refills: 0 | Status: SHIPPED | OUTPATIENT
Start: 2023-12-27

## 2024-01-22 ENCOUNTER — TELEPHONE (OUTPATIENT)
Dept: PRIMARY CARE CLINIC | Facility: CLINIC | Age: 54
End: 2024-01-22

## 2024-01-22 NOTE — TELEPHONE ENCOUNTER
Patient called in has been having on and off Vertigo for the past week and half. This morning was the worst he has had. Advised would need to send a message to see when can be worked in. Nothing available till next week. Patient wanted to be seen sooner.

## 2024-01-22 NOTE — TELEPHONE ENCOUNTER
Called and spoke with the patient. Patient reports that he is having vertigo symptoms off and on today- this past week. Seems to come and go, patient has been seen before by ENT, however when he called they told him the earliest they could see him was middle of February.  Patient does not have any medication for Vertigo, never has had it before.  Patient is seeking advice, told him I will call him back and let him know if an appointment is recommended

## 2024-01-26 ENCOUNTER — OFFICE VISIT (OUTPATIENT)
Dept: PRIMARY CARE CLINIC | Facility: CLINIC | Age: 54
End: 2024-01-26
Payer: COMMERCIAL

## 2024-01-26 VITALS
BODY MASS INDEX: 21.19 KG/M2 | DIASTOLIC BLOOD PRESSURE: 69 MMHG | TEMPERATURE: 97.3 F | OXYGEN SATURATION: 98 % | SYSTOLIC BLOOD PRESSURE: 116 MMHG | HEIGHT: 70 IN | RESPIRATION RATE: 18 BRPM | HEART RATE: 93 BPM | WEIGHT: 148 LBS

## 2024-01-26 DIAGNOSIS — R42 EPISODE OF DIZZINESS: Primary | ICD-10-CM

## 2024-01-26 DIAGNOSIS — E03.9 ACQUIRED HYPOTHYROIDISM: ICD-10-CM

## 2024-01-26 DIAGNOSIS — Z11.59 NEED FOR HEPATITIS B SCREENING TEST: ICD-10-CM

## 2024-01-26 DIAGNOSIS — R42 EPISODE OF DIZZINESS: ICD-10-CM

## 2024-01-26 DIAGNOSIS — H69.01 PATULOUS EUSTACHIAN TUBE OF RIGHT EAR: ICD-10-CM

## 2024-01-26 DIAGNOSIS — R73.02 IGT (IMPAIRED GLUCOSE TOLERANCE): ICD-10-CM

## 2024-01-26 DIAGNOSIS — H93.8X1 EAR PRESSURE, RIGHT: ICD-10-CM

## 2024-01-26 PROCEDURE — 99214 OFFICE O/P EST MOD 30 MIN: CPT | Performed by: INTERNAL MEDICINE

## 2024-01-26 RX ORDER — HYDROCORTISONE AND ACETIC ACID 20.75; 10.375 MG/ML; MG/ML
3 SOLUTION AURICULAR (OTIC) 2 TIMES DAILY
Qty: 10 ML | Refills: 0 | Status: SHIPPED | OUTPATIENT
Start: 2024-01-26 | End: 2024-02-05

## 2024-01-26 ASSESSMENT — PATIENT HEALTH QUESTIONNAIRE - PHQ9
SUM OF ALL RESPONSES TO PHQ QUESTIONS 1-9: 0
SUM OF ALL RESPONSES TO PHQ9 QUESTIONS 1 & 2: 0
SUM OF ALL RESPONSES TO PHQ QUESTIONS 1-9: 0
1. LITTLE INTEREST OR PLEASURE IN DOING THINGS: 0
SUM OF ALL RESPONSES TO PHQ QUESTIONS 1-9: 0
SUM OF ALL RESPONSES TO PHQ QUESTIONS 1-9: 0
2. FEELING DOWN, DEPRESSED OR HOPELESS: 0

## 2024-01-26 ASSESSMENT — ENCOUNTER SYMPTOMS
BACK PAIN: 0
EYE DISCHARGE: 0
SORE THROAT: 0
COLOR CHANGE: 0
DIARRHEA: 0
RHINORRHEA: 0
COUGH: 0
CHEST TIGHTNESS: 0
SHORTNESS OF BREATH: 0
ABDOMINAL PAIN: 0

## 2024-01-26 NOTE — PROGRESS NOTES
Cristóbal Berg (:  1970) is a 53 y.o. male, Established patient, here for evaluation of the following chief complaint(s):  Dizziness           ASSESSMENT/PLAN:  1. Episode of dizziness  -     Comprehensive Metabolic Panel; Future  I ordered a CMP.  No medication is necessary at this time due to the momentary transience of his dizziness.    2. Ear pressure, right  -     acetic acid-hydrocortisone (VOSOL-HC) 1-2 % otic solution; Place 3 drops into the right ear 2 times daily for 10 days, Disp-10 mL, R-0Normal sent to pharmacy.  I prescribed Vosol eardrops.  Potential side effects were discussed.     3. Patulous eustachian tube of right ear  He is followed by Otolaryngology.    4. Acquired hypothyroidism  -     TSH; Future  I ordered blood work to check his TSH.  I recommend that he continue taking Unithroid 112 mcg daily.    5. IGT (impaired glucose tolerance)  -     Hemoglobin A1C; Future  I ordered blood work to check his Hgb A1c.    6. Need for hepatitis B screening test  -     Hepatitis B Surface Antibody; Future  Ordered Hepatitis B test. Waiting for results.              Subjective   SUBJECTIVE/OBJECTIVE:  HPI    Patient presents today for dizziness.  He is not fasting today.    He reports having dizziness.  He saw an ENT in the past.  She states that he sometimes feels pressure in his right ear.  He states that he was diagnosed with patulous eustachian tube.  He was told to lean forward or bed down to equalize the fluid in his ear.  He has a history of vertigo which stays for less than a minute.    He has takes TMJ and has been in PT.    He is taking Unithroid 112 mcg daily.      Patient Active Problem List   Diagnosis    Acquired hypothyroidism    Arthralgia of right temporomandibular joint    History of Helicobacter pylori infection    Tubular adenoma of colon    GERD (gastroesophageal reflux disease)        Current Outpatient Medications on File Prior to Visit   Medication Sig Dispense Refill

## 2024-01-26 NOTE — PROGRESS NOTES
\"Have you been to the ER, urgent care clinic since your last visit?  Hospitalized since your last visit?\"    NO          “Have you seen or consulted any other health care providers outside of [facility] since your last visit?”    NO         Chief Complaint   Patient presents with    Dizziness       Pt is ok with scribe.

## 2024-01-27 LAB
ALBUMIN SERPL-MCNC: 4.3 G/DL (ref 3.5–5)
ALBUMIN/GLOB SERPL: 1.5 (ref 1.1–2.2)
ALP SERPL-CCNC: 92 U/L (ref 45–117)
ALT SERPL-CCNC: 32 U/L (ref 12–78)
ANION GAP SERPL CALC-SCNC: 4 MMOL/L (ref 5–15)
AST SERPL-CCNC: 18 U/L (ref 15–37)
BILIRUB SERPL-MCNC: 0.4 MG/DL (ref 0.2–1)
BUN SERPL-MCNC: 16 MG/DL (ref 6–20)
BUN/CREAT SERPL: 20 (ref 12–20)
CALCIUM SERPL-MCNC: 9.5 MG/DL (ref 8.5–10.1)
CHLORIDE SERPL-SCNC: 107 MMOL/L (ref 97–108)
CO2 SERPL-SCNC: 30 MMOL/L (ref 21–32)
CREAT SERPL-MCNC: 0.81 MG/DL (ref 0.7–1.3)
EST. AVERAGE GLUCOSE BLD GHB EST-MCNC: 108 MG/DL
GLOBULIN SER CALC-MCNC: 2.9 G/DL (ref 2–4)
GLUCOSE SERPL-MCNC: 116 MG/DL (ref 65–100)
HBA1C MFR BLD: 5.4 % (ref 4–5.6)
HBV SURFACE AB SER QL: REACTIVE
HBV SURFACE AB SER-ACNC: 13.89 MIU/ML
POTASSIUM SERPL-SCNC: 4.2 MMOL/L (ref 3.5–5.1)
PROT SERPL-MCNC: 7.2 G/DL (ref 6.4–8.2)
SODIUM SERPL-SCNC: 141 MMOL/L (ref 136–145)
TSH SERPL DL<=0.05 MIU/L-ACNC: 3.55 UIU/ML (ref 0.36–3.74)

## 2024-03-21 ENCOUNTER — OFFICE VISIT (OUTPATIENT)
Age: 54
End: 2024-03-21
Payer: COMMERCIAL

## 2024-03-21 VITALS
WEIGHT: 146 LBS | SYSTOLIC BLOOD PRESSURE: 98 MMHG | BODY MASS INDEX: 20.9 KG/M2 | DIASTOLIC BLOOD PRESSURE: 67 MMHG | HEART RATE: 93 BPM | HEIGHT: 70 IN

## 2024-03-21 DIAGNOSIS — E03.9 ACQUIRED HYPOTHYROIDISM: Primary | ICD-10-CM

## 2024-03-21 PROCEDURE — 99204 OFFICE O/P NEW MOD 45 MIN: CPT | Performed by: INTERNAL MEDICINE

## 2024-03-21 RX ORDER — LEVOTHYROXINE SODIUM 112 UG/1
TABLET ORAL
Qty: 90 TABLET | Refills: 3 | Status: SHIPPED | OUTPATIENT
Start: 2024-03-21

## 2024-06-25 ENCOUNTER — OFFICE VISIT (OUTPATIENT)
Dept: PRIMARY CARE CLINIC | Facility: CLINIC | Age: 54
End: 2024-06-25
Payer: COMMERCIAL

## 2024-06-25 VITALS
WEIGHT: 146 LBS | BODY MASS INDEX: 20.9 KG/M2 | TEMPERATURE: 97.7 F | SYSTOLIC BLOOD PRESSURE: 115 MMHG | HEIGHT: 70 IN | RESPIRATION RATE: 17 BRPM | OXYGEN SATURATION: 99 % | DIASTOLIC BLOOD PRESSURE: 71 MMHG | HEART RATE: 72 BPM

## 2024-06-25 DIAGNOSIS — E03.9 ACQUIRED HYPOTHYROIDISM: ICD-10-CM

## 2024-06-25 DIAGNOSIS — E55.9 VITAMIN D DEFICIENCY: ICD-10-CM

## 2024-06-25 DIAGNOSIS — K21.9 GASTRO-ESOPHAGEAL REFLUX DISEASE WITHOUT ESOPHAGITIS: ICD-10-CM

## 2024-06-25 DIAGNOSIS — Z00.00 PHYSICAL EXAM: ICD-10-CM

## 2024-06-25 DIAGNOSIS — Z00.00 PHYSICAL EXAM: Primary | ICD-10-CM

## 2024-06-25 LAB
25(OH)D3 SERPL-MCNC: 28.2 NG/ML (ref 30–100)
ALBUMIN SERPL-MCNC: 4.5 G/DL (ref 3.5–5)
ALBUMIN/GLOB SERPL: 1.4 (ref 1.1–2.2)
ALP SERPL-CCNC: 105 U/L (ref 45–117)
ALT SERPL-CCNC: 28 U/L (ref 12–78)
ANION GAP SERPL CALC-SCNC: 4 MMOL/L (ref 5–15)
AST SERPL-CCNC: 22 U/L (ref 15–37)
BILIRUB SERPL-MCNC: 0.8 MG/DL (ref 0.2–1)
BUN SERPL-MCNC: 19 MG/DL (ref 6–20)
BUN/CREAT SERPL: 21 (ref 12–20)
CALCIUM SERPL-MCNC: 9.4 MG/DL (ref 8.5–10.1)
CHLORIDE SERPL-SCNC: 109 MMOL/L (ref 97–108)
CHOLEST SERPL-MCNC: 229 MG/DL
CO2 SERPL-SCNC: 29 MMOL/L (ref 21–32)
CREAT SERPL-MCNC: 0.92 MG/DL (ref 0.7–1.3)
ERYTHROCYTE [DISTWIDTH] IN BLOOD BY AUTOMATED COUNT: 12.2 % (ref 11.5–14.5)
GLOBULIN SER CALC-MCNC: 3.3 G/DL (ref 2–4)
GLUCOSE SERPL-MCNC: 102 MG/DL (ref 65–100)
HCT VFR BLD AUTO: 47.8 % (ref 36.6–50.3)
HDLC SERPL-MCNC: 79 MG/DL
HDLC SERPL: 2.9 (ref 0–5)
HGB BLD-MCNC: 15.3 G/DL (ref 12.1–17)
LDLC SERPL CALC-MCNC: 131.6 MG/DL (ref 0–100)
MCH RBC QN AUTO: 28.5 PG (ref 26–34)
MCHC RBC AUTO-ENTMCNC: 32 G/DL (ref 30–36.5)
MCV RBC AUTO: 89 FL (ref 80–99)
NRBC # BLD: 0 K/UL (ref 0–0.01)
NRBC BLD-RTO: 0 PER 100 WBC
PLATELET # BLD AUTO: 265 K/UL (ref 150–400)
PMV BLD AUTO: 9.9 FL (ref 8.9–12.9)
POTASSIUM SERPL-SCNC: 4.1 MMOL/L (ref 3.5–5.1)
PROT SERPL-MCNC: 7.8 G/DL (ref 6.4–8.2)
PSA SERPL-MCNC: 8.9 NG/ML (ref 0.01–4)
RBC # BLD AUTO: 5.37 M/UL (ref 4.1–5.7)
SODIUM SERPL-SCNC: 142 MMOL/L (ref 136–145)
TRIGL SERPL-MCNC: 92 MG/DL
VLDLC SERPL CALC-MCNC: 18.4 MG/DL
WBC # BLD AUTO: 7.5 K/UL (ref 4.1–11.1)

## 2024-06-25 PROCEDURE — 99396 PREV VISIT EST AGE 40-64: CPT | Performed by: INTERNAL MEDICINE

## 2024-06-25 RX ORDER — FAMOTIDINE 40 MG/1
TABLET, FILM COATED ORAL
Qty: 90 TABLET | Refills: 0 | Status: CANCELLED | OUTPATIENT
Start: 2024-06-25

## 2024-06-25 RX ORDER — FAMOTIDINE 40 MG/1
40 TABLET, FILM COATED ORAL NIGHTLY
Qty: 90 TABLET | Refills: 0 | Status: SHIPPED | OUTPATIENT
Start: 2024-06-25

## 2024-06-25 SDOH — ECONOMIC STABILITY: FOOD INSECURITY: WITHIN THE PAST 12 MONTHS, YOU WORRIED THAT YOUR FOOD WOULD RUN OUT BEFORE YOU GOT MONEY TO BUY MORE.: NEVER TRUE

## 2024-06-25 SDOH — ECONOMIC STABILITY: FOOD INSECURITY: WITHIN THE PAST 12 MONTHS, THE FOOD YOU BOUGHT JUST DIDN'T LAST AND YOU DIDN'T HAVE MONEY TO GET MORE.: NEVER TRUE

## 2024-06-25 SDOH — ECONOMIC STABILITY: INCOME INSECURITY: HOW HARD IS IT FOR YOU TO PAY FOR THE VERY BASICS LIKE FOOD, HOUSING, MEDICAL CARE, AND HEATING?: NOT HARD AT ALL

## 2024-06-25 ASSESSMENT — ENCOUNTER SYMPTOMS
CONSTIPATION: 0
COLOR CHANGE: 0
SHORTNESS OF BREATH: 0
COUGH: 0
EYE DISCHARGE: 0
CHEST TIGHTNESS: 0
SORE THROAT: 0
RHINORRHEA: 0
BACK PAIN: 0
DIARRHEA: 0
ABDOMINAL PAIN: 0

## 2024-06-25 ASSESSMENT — PATIENT HEALTH QUESTIONNAIRE - PHQ9
SUM OF ALL RESPONSES TO PHQ QUESTIONS 1-9: 0
1. LITTLE INTEREST OR PLEASURE IN DOING THINGS: NOT AT ALL
2. FEELING DOWN, DEPRESSED OR HOPELESS: NOT AT ALL
SUM OF ALL RESPONSES TO PHQ QUESTIONS 1-9: 0
SUM OF ALL RESPONSES TO PHQ9 QUESTIONS 1 & 2: 0

## 2024-06-25 NOTE — PROGRESS NOTES
Health Decision Maker has been checked with the patient      Patient has stated that the scribe can come in room  PA okay to come in room    Chief Complaint   Patient presents with    Annual Exam     Labs- fasting      Vitals:    06/25/24 1004   BP: 115/71   Site: Left Upper Arm   Pulse: 72   Resp: 17   Temp: 97.7 °F (36.5 °C)   SpO2: 99%   Weight: 66.2 kg (146 lb)   Height: 1.778 m (5' 10\")      There were no vitals filed for this visit.   Depression: Not at risk (6/25/2024)    PHQ-2     PHQ-2 Score: 0       \"Have you been to the ER, urgent care clinic since your last visit?  Hospitalized since your last visit?\"    YES - When: approximately 1 months ago.  Where and Why: [facility]    “Have you seen or consulted any other health care providers outside of [facility] since your last visit?”    NO        Click Here for Release of Records Request      Specialist patient sees: none    Chart reviewed: immunizations are documented.   Immunization History   Administered Date(s) Administered    COVID-19, PFIZER PURPLE top, DILUTE for use, (age 12 y+), 30mcg/0.3mL 09/06/2021, 09/28/2021    TDaP, ADACEL (age 10y-64y), BOOSTRIX (age 10y+), IM, 0.5mL 07/23/2015      
drink, food or meds.  Take calcium, iron and multi-vitamins 4 hours apart. 90 tablet 3     No current facility-administered medications on file prior to visit.       No Known Allergies    Past Medical History:   Diagnosis Date    Acquired hypothyroidism 4/10/2017    GERD (gastroesophageal reflux disease)     Helicobacter pylori gastritis 03/2018       Past Surgical History:   Procedure Laterality Date    COLONOSCOPY  03/2018    Dr. Cast, repeat 10 years    UPPER GASTROINTESTINAL ENDOSCOPY  03/2018    Dr. Cast       Family History   Problem Relation Age of Onset    Elevated Lipids Mother     No Known Problems Father     Thyroid Disease Mother        Social History     Socioeconomic History    Marital status:      Spouse name: Not on file    Number of children: Not on file    Years of education: Not on file    Highest education level: Not on file   Occupational History    Not on file   Tobacco Use    Smoking status: Never    Smokeless tobacco: Never   Vaping Use    Vaping Use: Never used   Substance and Sexual Activity    Alcohol use: No    Drug use: No    Sexual activity: Not Currently     Birth control/protection: Condom   Other Topics Concern    Not on file   Social History Narrative    Not on file     Social Determinants of Health     Financial Resource Strain: Low Risk  (6/25/2024)    Overall Financial Resource Strain (CARDIA)     Difficulty of Paying Living Expenses: Not hard at all   Food Insecurity: No Food Insecurity (6/25/2024)    Hunger Vital Sign     Worried About Running Out of Food in the Last Year: Never true     Ran Out of Food in the Last Year: Never true   Transportation Needs: Unknown (6/25/2024)    PRAPARE - Transportation     Lack of Transportation (Medical): Not on file     Lack of Transportation (Non-Medical): No   Physical Activity: Not on file   Stress: Not on file   Social Connections: Not on file   Intimate Partner Violence: Not on file   Housing Stability: Unknown (6/25/2024)

## 2024-07-24 ENCOUNTER — TELEPHONE (OUTPATIENT)
Dept: PRIMARY CARE CLINIC | Facility: CLINIC | Age: 54
End: 2024-07-24

## 2024-07-24 NOTE — TELEPHONE ENCOUNTER
Patient asked to be seen today for stomach issues. Patient said he is not sure if it's acid reflux.

## 2024-07-24 NOTE — TELEPHONE ENCOUNTER
Spoke to pt and sometimes he thinks its coming from the upper part of stomach or esophagus. He gets body chills, cold feeling, dizzy -  like the feeling you get before you throw up. Last time this happened was yesterday. I asked if he had a GI doctor and he said he does. I told him I will talk to Dr. Fonseca and get back to him. He said if he goes to the ER then he gets told he should of made an franco here. I told him I will get back with him. He said ok, thank you.

## 2024-07-24 NOTE — TELEPHONE ENCOUNTER
Spoke to pt and offered him an franco next Tuesday if he can wait until then and he said, ahhh sure. I offered him 2:15pm and 11:45am and he said 11:45am, thank you.

## 2024-08-14 ENCOUNTER — OFFICE VISIT (OUTPATIENT)
Age: 54
End: 2024-08-14

## 2024-08-14 VITALS
HEIGHT: 69 IN | HEART RATE: 92 BPM | SYSTOLIC BLOOD PRESSURE: 105 MMHG | RESPIRATION RATE: 18 BRPM | TEMPERATURE: 98.2 F | OXYGEN SATURATION: 97 % | DIASTOLIC BLOOD PRESSURE: 66 MMHG | BODY MASS INDEX: 21.3 KG/M2 | WEIGHT: 143.8 LBS

## 2024-08-14 DIAGNOSIS — K21.9 GASTROESOPHAGEAL REFLUX DISEASE, UNSPECIFIED WHETHER ESOPHAGITIS PRESENT: Primary | ICD-10-CM

## 2024-08-14 RX ORDER — CHOLECALCIFEROL (VITAMIN D3) 1250 MCG
1000 CAPSULE ORAL DAILY
COMMUNITY

## 2024-08-14 RX ORDER — PANTOPRAZOLE SODIUM 40 MG/1
40 TABLET, DELAYED RELEASE ORAL
Qty: 30 TABLET | Refills: 0 | Status: SHIPPED | OUTPATIENT
Start: 2024-08-14 | End: 2024-09-13 | Stop reason: SDUPTHER

## 2024-08-28 ENCOUNTER — OFFICE VISIT (OUTPATIENT)
Dept: PRIMARY CARE CLINIC | Facility: CLINIC | Age: 54
End: 2024-08-28
Payer: COMMERCIAL

## 2024-08-28 ENCOUNTER — TELEPHONE (OUTPATIENT)
Dept: PRIMARY CARE CLINIC | Facility: CLINIC | Age: 54
End: 2024-08-28

## 2024-08-28 VITALS
HEIGHT: 69 IN | RESPIRATION RATE: 18 BRPM | WEIGHT: 144 LBS | BODY MASS INDEX: 21.33 KG/M2 | HEART RATE: 91 BPM | TEMPERATURE: 97.2 F | SYSTOLIC BLOOD PRESSURE: 105 MMHG | OXYGEN SATURATION: 99 % | DIASTOLIC BLOOD PRESSURE: 61 MMHG

## 2024-08-28 DIAGNOSIS — R14.0 POSTPRANDIAL ABDOMINAL BLOATING: ICD-10-CM

## 2024-08-28 DIAGNOSIS — R10.13 EPIGASTRIC DISCOMFORT: Primary | ICD-10-CM

## 2024-08-28 DIAGNOSIS — K21.9 GASTROESOPHAGEAL REFLUX DISEASE WITHOUT ESOPHAGITIS: ICD-10-CM

## 2024-08-28 PROCEDURE — 99213 OFFICE O/P EST LOW 20 MIN: CPT | Performed by: INTERNAL MEDICINE

## 2024-08-28 ASSESSMENT — ENCOUNTER SYMPTOMS
SHORTNESS OF BREATH: 0
COUGH: 0
CONSTIPATION: 0
DIARRHEA: 0
RHINORRHEA: 0
EYE DISCHARGE: 0
BACK PAIN: 0
ABDOMINAL DISTENTION: 1
CHEST TIGHTNESS: 0
COLOR CHANGE: 0
SORE THROAT: 0

## 2024-08-28 ASSESSMENT — PATIENT HEALTH QUESTIONNAIRE - PHQ9
2. FEELING DOWN, DEPRESSED OR HOPELESS: NOT AT ALL
SUM OF ALL RESPONSES TO PHQ QUESTIONS 1-9: 0
SUM OF ALL RESPONSES TO PHQ9 QUESTIONS 1 & 2: 0
SUM OF ALL RESPONSES TO PHQ QUESTIONS 1-9: 0
SUM OF ALL RESPONSES TO PHQ QUESTIONS 1-9: 0
1. LITTLE INTEREST OR PLEASURE IN DOING THINGS: NOT AT ALL
SUM OF ALL RESPONSES TO PHQ QUESTIONS 1-9: 0

## 2024-08-28 NOTE — PROGRESS NOTES
Cristóbal Berg (:  1970) is a 53 y.o. male, Established patient, here for evaluation of the following chief complaint(s):  Stomach problem (Inflammation problems maybe in stomach, he mentioned.Does have julio cesar pp with GI on Oct. )      ASSESSMENT/PLAN:  1. Epigastric discomfort  -     US ABDOMEN COMPLETE; Future  I ordered a complete abdominal US.  Notes from  reviewed.     2. Gastroesophageal reflux disease without esophagitis  I recommend that he continue taking pantoprazole 40mg daily.    3. Postprandial abdominal bloating  -     US ABDOMEN COMPLETE; Future  I ordered a complete abdominal US.            Subjective   SUBJECTIVE/OBJECTIVE:  HPI    Patient presents today for GI issues.    He reports abdominal inflammation that does not radiate up and bloating. His symptoms are most severe after eating. He states that his inflammation is not a heartburn. He takes pantoprazole 40mg daily and his symptoms have slightly improved since starting it on . He did not have a cholecystectomy. He is scheduled for a colonoscopy/EGD with Dr. Rose (Gastroenterology).    Patient Active Problem List   Diagnosis    Acquired hypothyroidism    Arthralgia of right temporomandibular joint    History of Helicobacter pylori infection    Tubular adenoma of colon    GERD (gastroesophageal reflux disease)    Patulous eustachian tube of right ear        Current Outpatient Medications on File Prior to Visit   Medication Sig Dispense Refill    Cholecalciferol (VITAMIN D3) 1.25 MG (71832 UT) CAPS Take 1,000 Units by mouth daily      pantoprazole (PROTONIX) 40 MG tablet Take 1 tablet by mouth every morning (before breakfast) 30 tablet 0    Multiple Vitamins-Minerals (MULTIVITAMIN ADULT, MINERALS, PO) Take by mouth      Probiotic Product (PROBIOTIC-10 PO) Take by mouth      famotidine (PEPCID) 40 MG tablet Take 1 tablet by mouth at bedtime 90 tablet 0    UNITHROID 112 MCG tablet One tablet by mouth once a day.  Take in AM with water

## 2024-08-28 NOTE — PROGRESS NOTES
\"Have you been to the ER, urgent care clinic since your last visit?  Hospitalized since your last visit?\"    NO      “Have you seen or consulted any other health care providers outside of [facility] since your last visit?”    Urgent Care       Chief Complaint   Patient presents with    Stomach problem     Inflammation problems maybe in stomach, he mentioned.Does have julio cesar pp with GI on Oct.        Pt is ok with scribe.

## 2024-09-04 ENCOUNTER — HOSPITAL ENCOUNTER (OUTPATIENT)
Facility: HOSPITAL | Age: 54
Discharge: HOME | End: 2024-09-07
Attending: INTERNAL MEDICINE
Payer: COMMERCIAL

## 2024-09-04 DIAGNOSIS — R10.13 EPIGASTRIC DISCOMFORT: ICD-10-CM

## 2024-09-04 DIAGNOSIS — R14.0 POSTPRANDIAL ABDOMINAL BLOATING: ICD-10-CM

## 2024-09-04 PROCEDURE — 76700 US EXAM ABDOM COMPLETE: CPT

## 2024-09-05 DIAGNOSIS — K21.9 GASTROESOPHAGEAL REFLUX DISEASE, UNSPECIFIED WHETHER ESOPHAGITIS PRESENT: ICD-10-CM

## 2024-09-05 RX ORDER — PANTOPRAZOLE SODIUM 40 MG/1
40 TABLET, DELAYED RELEASE ORAL
Qty: 90 TABLET | Refills: 1 | OUTPATIENT
Start: 2024-09-05

## 2024-09-08 DIAGNOSIS — K21.9 GASTROESOPHAGEAL REFLUX DISEASE WITHOUT ESOPHAGITIS: ICD-10-CM

## 2024-09-08 DIAGNOSIS — K82.4 POLYP OF GALLBLADDER: Primary | ICD-10-CM

## 2024-09-13 DIAGNOSIS — K21.9 GASTROESOPHAGEAL REFLUX DISEASE, UNSPECIFIED WHETHER ESOPHAGITIS PRESENT: ICD-10-CM

## 2024-09-13 RX ORDER — PANTOPRAZOLE SODIUM 40 MG/1
40 TABLET, DELAYED RELEASE ORAL
Qty: 90 TABLET | Refills: 0 | Status: SHIPPED | OUTPATIENT
Start: 2024-09-13

## 2024-09-17 ENCOUNTER — OFFICE VISIT (OUTPATIENT)
Age: 54
End: 2024-09-17
Payer: COMMERCIAL

## 2024-09-17 VITALS
HEART RATE: 87 BPM | WEIGHT: 145 LBS | TEMPERATURE: 98.3 F | SYSTOLIC BLOOD PRESSURE: 115 MMHG | RESPIRATION RATE: 18 BRPM | BODY MASS INDEX: 21.48 KG/M2 | OXYGEN SATURATION: 98 % | DIASTOLIC BLOOD PRESSURE: 79 MMHG | HEIGHT: 69 IN

## 2024-09-17 DIAGNOSIS — K82.4 GALLBLADDER POLYP: ICD-10-CM

## 2024-09-17 DIAGNOSIS — K21.9 GASTROESOPHAGEAL REFLUX DISEASE, UNSPECIFIED WHETHER ESOPHAGITIS PRESENT: Primary | ICD-10-CM

## 2024-09-17 DIAGNOSIS — R10.13 EPIGASTRIC PAIN: ICD-10-CM

## 2024-09-17 PROCEDURE — 99204 OFFICE O/P NEW MOD 45 MIN: CPT | Performed by: SURGERY

## 2024-09-17 ASSESSMENT — PATIENT HEALTH QUESTIONNAIRE - PHQ9
SUM OF ALL RESPONSES TO PHQ QUESTIONS 1-9: 0
2. FEELING DOWN, DEPRESSED OR HOPELESS: NOT AT ALL
1. LITTLE INTEREST OR PLEASURE IN DOING THINGS: NOT AT ALL
SUM OF ALL RESPONSES TO PHQ9 QUESTIONS 1 & 2: 0

## 2024-09-24 DIAGNOSIS — K21.9 GASTRO-ESOPHAGEAL REFLUX DISEASE WITHOUT ESOPHAGITIS: ICD-10-CM

## 2024-09-24 RX ORDER — FAMOTIDINE 40 MG/1
40 TABLET, FILM COATED ORAL
Qty: 90 TABLET | Refills: 0 | Status: SHIPPED | OUTPATIENT
Start: 2024-09-24

## 2025-02-21 DIAGNOSIS — E03.9 ACQUIRED HYPOTHYROIDISM: ICD-10-CM

## 2025-03-12 ENCOUNTER — OFFICE VISIT (OUTPATIENT)
Dept: PRIMARY CARE CLINIC | Facility: CLINIC | Age: 55
End: 2025-03-12
Payer: COMMERCIAL

## 2025-03-12 VITALS
RESPIRATION RATE: 16 BRPM | SYSTOLIC BLOOD PRESSURE: 112 MMHG | HEIGHT: 69 IN | WEIGHT: 150.8 LBS | BODY MASS INDEX: 22.33 KG/M2 | DIASTOLIC BLOOD PRESSURE: 68 MMHG | TEMPERATURE: 97.2 F | OXYGEN SATURATION: 100 % | HEART RATE: 79 BPM

## 2025-03-12 DIAGNOSIS — K21.9 GASTROESOPHAGEAL REFLUX DISEASE WITHOUT ESOPHAGITIS: Primary | ICD-10-CM

## 2025-03-12 DIAGNOSIS — E03.9 ACQUIRED HYPOTHYROIDISM: ICD-10-CM

## 2025-03-12 DIAGNOSIS — R68.89 SENSATION OF FEELING COLD: ICD-10-CM

## 2025-03-12 DIAGNOSIS — K82.4 GALLBLADDER POLYP: ICD-10-CM

## 2025-03-12 DIAGNOSIS — R97.20 ELEVATED PSA: ICD-10-CM

## 2025-03-12 LAB
T4 FREE SERPL-MCNC: 1.4 NG/DL (ref 0.8–1.5)
TSH SERPL DL<=0.05 MIU/L-ACNC: 4.28 UIU/ML (ref 0.36–3.74)

## 2025-03-12 PROCEDURE — 99214 OFFICE O/P EST MOD 30 MIN: CPT | Performed by: INTERNAL MEDICINE

## 2025-03-12 RX ORDER — OMEPRAZOLE 20 MG/1
20 CAPSULE, DELAYED RELEASE ORAL DAILY
COMMUNITY

## 2025-03-12 SDOH — ECONOMIC STABILITY: FOOD INSECURITY: WITHIN THE PAST 12 MONTHS, THE FOOD YOU BOUGHT JUST DIDN'T LAST AND YOU DIDN'T HAVE MONEY TO GET MORE.: NEVER TRUE

## 2025-03-12 SDOH — ECONOMIC STABILITY: FOOD INSECURITY: WITHIN THE PAST 12 MONTHS, YOU WORRIED THAT YOUR FOOD WOULD RUN OUT BEFORE YOU GOT MONEY TO BUY MORE.: NEVER TRUE

## 2025-03-12 ASSESSMENT — ENCOUNTER SYMPTOMS
COUGH: 0
ABDOMINAL PAIN: 0
BACK PAIN: 1
RHINORRHEA: 0
CHEST TIGHTNESS: 0
SHORTNESS OF BREATH: 0
SORE THROAT: 0
COLOR CHANGE: 0
DIARRHEA: 0
EYE DISCHARGE: 0
CONSTIPATION: 0

## 2025-03-12 ASSESSMENT — PATIENT HEALTH QUESTIONNAIRE - PHQ9
SUM OF ALL RESPONSES TO PHQ QUESTIONS 1-9: 0
2. FEELING DOWN, DEPRESSED OR HOPELESS: NOT AT ALL
SUM OF ALL RESPONSES TO PHQ QUESTIONS 1-9: 0
1. LITTLE INTEREST OR PLEASURE IN DOING THINGS: NOT AT ALL

## 2025-03-12 NOTE — PROGRESS NOTES
Health Decision Maker has been checked with the patient          Chief Complaint   Patient presents with    Follow-up     Existing condition and has medical forms to fill out.       \"Have you been to the ER, urgent care clinic since your last visit?  Hospitalized since your last visit?\"    NO    “Have you seen or consulted any other [facility] care providers [facility]?”    NO      Vitals:    03/12/25 1059   Temp: 97.2 °F (36.2 °C)   TempSrc: Temporal   Weight: 68.4 kg (150 lb 12.8 oz)   Height: 1.753 m (5' 9\")                 Click Here for Release of Records Request  
06/25/2024 92  <150 MG/DL Final    Based on NCEP-ATP III:  Triglycerides <150 mg/dL  is considered normal, 150-199 mg/dL  borderline high,  200-499 mg/dL high and  greater than or equal to 500 mg/dL very high.    HDL 06/25/2024 79  MG/DL Final    Based on NCEP ATP III, HDL Cholesterol <40 mg/dL is considered low and >60 mg/dL is elevated.    LDL Cholesterol 06/25/2024 131.6 (H)  0 - 100 MG/DL Final    Comment: Based on the NCEP-ATP: LDL-C concentrations are considered  optimal <100 mg/dL,  near optimal/above Normal 100-129 mg/dL  Borderline High: 130-159, High: 160-189 mg/dL  Very High: Greater than or equal to 190 mg/dL      VLDL Cholesterol Calculated 06/25/2024 18.4  MG/DL Final    Chol/HDL Ratio 06/25/2024 2.9  0.0 - 5.0   Final    Vit D, 25-Hydroxy 06/25/2024 28.2 (L)  30 - 100 ng/mL Final    Comment: (NOTE)  Deficiency               <20 ng/mL  Insufficiency          20-30 ng/mL  Sufficient             ng/mL  Possible toxicity       >100 ng/mL    The Method used is Siemens Advia Centaur currently standardized to a   Center of Disease Control and Prevention (CDC) certified reference   method. Samples containing fluorescein dye can produce falsely   elevated values when tested with the ADVIA Centaur Vitamin D Assay.   It is recommended that results in the toxic range, >100 ng/mL, be   retested 72 hours post fluorescein exposure.      WBC 06/25/2024 7.5  4.1 - 11.1 K/uL Final    RBC 06/25/2024 5.37  4.10 - 5.70 M/uL Final    Hemoglobin 06/25/2024 15.3  12.1 - 17.0 g/dL Final    Hematocrit 06/25/2024 47.8  36.6 - 50.3 % Final    MCV 06/25/2024 89.0  80.0 - 99.0 FL Final    MCH 06/25/2024 28.5  26.0 - 34.0 PG Final    MCHC 06/25/2024 32.0  30.0 - 36.5 g/dL Final    RDW 06/25/2024 12.2  11.5 - 14.5 % Final    Platelets 06/25/2024 265  150 - 400 K/uL Final    MPV 06/25/2024 9.9  8.9 - 12.9 FL Final    Nucleated RBCs 06/25/2024 0.0  0  WBC Final    nRBC 06/25/2024 0.00  0.00 - 0.01 K/uL Final

## 2025-03-13 ENCOUNTER — RESULTS FOLLOW-UP (OUTPATIENT)
Dept: PRIMARY CARE CLINIC | Facility: CLINIC | Age: 55
End: 2025-03-13

## 2025-03-17 ENCOUNTER — OFFICE VISIT (OUTPATIENT)
Age: 55
End: 2025-03-17
Payer: COMMERCIAL

## 2025-03-17 VITALS
SYSTOLIC BLOOD PRESSURE: 114 MMHG | BODY MASS INDEX: 22.51 KG/M2 | DIASTOLIC BLOOD PRESSURE: 68 MMHG | HEART RATE: 77 BPM | HEIGHT: 69 IN | WEIGHT: 152 LBS

## 2025-03-17 DIAGNOSIS — R20.9 COLD EXTREMITIES: ICD-10-CM

## 2025-03-17 DIAGNOSIS — E03.9 ACQUIRED HYPOTHYROIDISM: Primary | ICD-10-CM

## 2025-03-17 PROCEDURE — 99214 OFFICE O/P EST MOD 30 MIN: CPT | Performed by: INTERNAL MEDICINE

## 2025-03-17 PROCEDURE — G2211 COMPLEX E/M VISIT ADD ON: HCPCS | Performed by: INTERNAL MEDICINE

## 2025-03-17 RX ORDER — LEVOTHYROXINE SODIUM 125 UG/1
TABLET ORAL
Qty: 90 TABLET | Refills: 2 | Status: SHIPPED | OUTPATIENT
Start: 2025-03-17

## 2025-03-17 NOTE — PROGRESS NOTES
3/17/2026      Orders Placed This Encounter   Medications    UNITHROID 125 MCG tablet     Sig: One tablet by mouth once a day.  Take in AM with water and wait 30-60 minutes before other drink, food or meds.  Take calcium, iron and multi-vitamins 4 hours apart.     Dispense:  90 tablet     Refill:  2        Return in about 1 year (around 3/17/2026).

## 2025-05-16 DIAGNOSIS — E03.9 ACQUIRED HYPOTHYROIDISM: ICD-10-CM

## 2025-06-05 LAB
T4 FREE SERPL-MCNC: 1.44 NG/DL (ref 0.82–1.77)
TSH SERPL DL<=0.005 MIU/L-ACNC: 1.53 UIU/ML (ref 0.45–4.5)

## 2025-06-06 ENCOUNTER — RESULTS FOLLOW-UP (OUTPATIENT)
Age: 55
End: 2025-06-06

## 2025-06-07 LAB
H PYLORI AG STL QL IA: POSITIVE
SPECIMEN SOURCE: ABNORMAL

## 2025-06-10 DIAGNOSIS — E03.9 ACQUIRED HYPOTHYROIDISM: ICD-10-CM

## 2025-06-10 RX ORDER — LEVOTHYROXINE SODIUM 112 UG/1
TABLET ORAL
Qty: 90 TABLET | Refills: 3 | OUTPATIENT
Start: 2025-06-10

## 2025-06-18 ENCOUNTER — TELEPHONE (OUTPATIENT)
Dept: PRIMARY CARE CLINIC | Facility: CLINIC | Age: 55
End: 2025-06-18

## 2025-06-18 NOTE — TELEPHONE ENCOUNTER
----- Message from Jessee PIMENTEL sent at 6/18/2025 11:31 AM EDT -----  Regarding: ECC Message to Provider  ECC Message to Provider    Relationship to Patient: Self     Additional Information Patient wants to know if he needs to fast and need a blood work before the appointment  --------------------------------------------------------------------------------------------------------------------------    Call Back Information: OK to leave message on voicemail  Preferred Call Back Number: Phone [phone]

## 2025-06-30 ENCOUNTER — OFFICE VISIT (OUTPATIENT)
Dept: PRIMARY CARE CLINIC | Facility: CLINIC | Age: 55
End: 2025-06-30
Payer: COMMERCIAL

## 2025-06-30 VITALS
DIASTOLIC BLOOD PRESSURE: 77 MMHG | SYSTOLIC BLOOD PRESSURE: 121 MMHG | WEIGHT: 151.6 LBS | HEIGHT: 68 IN | BODY MASS INDEX: 22.97 KG/M2 | HEART RATE: 75 BPM | RESPIRATION RATE: 15 BRPM | OXYGEN SATURATION: 100 % | TEMPERATURE: 97.3 F

## 2025-06-30 DIAGNOSIS — I73.00 RAYNAUD'S DISEASE WITHOUT GANGRENE: ICD-10-CM

## 2025-06-30 DIAGNOSIS — E78.2 COMBINED HYPERLIPIDEMIA: ICD-10-CM

## 2025-06-30 DIAGNOSIS — Z00.00 PHYSICAL EXAM: Primary | ICD-10-CM

## 2025-06-30 DIAGNOSIS — Z23 NEED FOR VACCINATION WITH 20-POLYVALENT PNEUMOCOCCAL CONJUGATE VACCINE: ICD-10-CM

## 2025-06-30 DIAGNOSIS — A04.8 HELICOBACTER PYLORI (H. PYLORI) INFECTION: ICD-10-CM

## 2025-06-30 DIAGNOSIS — R97.20 BPH WITH ELEVATED PSA: ICD-10-CM

## 2025-06-30 DIAGNOSIS — E55.9 VITAMIN D DEFICIENCY: ICD-10-CM

## 2025-06-30 DIAGNOSIS — E03.9 ACQUIRED HYPOTHYROIDISM: ICD-10-CM

## 2025-06-30 DIAGNOSIS — N40.0 BPH WITH ELEVATED PSA: ICD-10-CM

## 2025-06-30 PROCEDURE — 90677 PCV20 VACCINE IM: CPT | Performed by: INTERNAL MEDICINE

## 2025-06-30 PROCEDURE — 99396 PREV VISIT EST AGE 40-64: CPT | Performed by: INTERNAL MEDICINE

## 2025-06-30 PROCEDURE — 90471 IMMUNIZATION ADMIN: CPT | Performed by: INTERNAL MEDICINE

## 2025-06-30 RX ORDER — MAGNESIUM 30 MG
30 TABLET ORAL 2 TIMES DAILY
COMMUNITY

## 2025-06-30 ASSESSMENT — PATIENT HEALTH QUESTIONNAIRE - PHQ9
SUM OF ALL RESPONSES TO PHQ QUESTIONS 1-9: 0
1. LITTLE INTEREST OR PLEASURE IN DOING THINGS: NOT AT ALL
SUM OF ALL RESPONSES TO PHQ QUESTIONS 1-9: 0
SUM OF ALL RESPONSES TO PHQ QUESTIONS 1-9: 0
2. FEELING DOWN, DEPRESSED OR HOPELESS: NOT AT ALL
SUM OF ALL RESPONSES TO PHQ QUESTIONS 1-9: 0

## 2025-06-30 ASSESSMENT — ENCOUNTER SYMPTOMS
EYE DISCHARGE: 0
SORE THROAT: 0
CONSTIPATION: 0
SHORTNESS OF BREATH: 0
CHEST TIGHTNESS: 0
BACK PAIN: 0
COUGH: 0
RHINORRHEA: 0
COLOR CHANGE: 0
DIARRHEA: 0
ABDOMINAL PAIN: 0

## 2025-06-30 NOTE — PROGRESS NOTES
Cristóbal Berg (:  1970) is a 54 y.o. male, Established patient, here for evaluation of the following chief complaint(s):  Annual Exam        ASSESSMENT/PLAN:  1. Physical exam  -     CBC; Future  -     Comprehensive Metabolic Panel; Future  Complete physical done today. Routine lab work ordered. Will be completed when he is fasting. Waiting for results.  2. Acquired hypothyroidism  Pt should continue taking Unithroid 125 mcg daily. He is followed by endocrinology.  3. Raynaud's disease without gangrene  I informed the pt that he likely has Raynaud's disease given his persistent cold intolerance. I also informed him that there is no treatment for the condition, and he should be sure to wear warm clothing and layers when needed.   4. Vitamin D deficiency  -     Vitamin D 25 Hydroxy; Future  Ordered lab work to check Vitamin D levels. Waiting for results. Recommend that patient take a Vitamin D supplement of 1,000 units daily.  5. Combined hyperlipidemia  -     Lipid Panel; Future  I ordered a lipid panel.  I recommend that he continue exercising, staying well-hydrated, and monitoring his diet.  6. BPH with elevated PSA  -     PSA, Diagnostic; Future  I ordered blood work to measure PSA levels.  7. Need for vaccination with 20-polyvalent pneumococcal conjugate vaccine  -     Pneumococcal, PCV20, PREVNAR 20, (age 6w+), IM, PF  Prevnar-20 administered in office today.  8. Helicobacter pylori (H. pylori) infection  Pt should continue following up with gastroenterology and discuss further treatment options.        Subjective   SUBJECTIVE/OBJECTIVE:  HPI    Patient presents today for  a physical. He is fasting for cholesterol labs.    Pt reports some lingering cold intolerance in his lower extremities. He denies weakness. He takes Unithroid 125 mcg daily, which he finds somewhat effective. He is followed by Dr. Domingo (endocrinology). He has not been diagnosed with Raynaud's in the past. TSH on 25 was WNL at

## 2025-06-30 NOTE — PROGRESS NOTES
Health Decision Maker has been checked with the patient   Primary Decision Maker: Juany Berg - Spouse - [phone]     Patient has stated that the scribe can come in room    Chief Complaint   Patient presents with    Annual Exam       \"Have you been to the ER, urgent care clinic since your last visit?  Hospitalized since your last visit?\"    NO    “Have you seen or consulted any other health care providers outside of [facility] since your last visit?”    NO      Vitals:    06/30/25 1307   BP: 121/77   BP Site: Right Upper Arm   Patient Position: Sitting   BP Cuff Size: Small Adult   Pulse: 75   Resp: 15   Temp: 97.3 °F (36.3 °C)   TempSrc: Temporal   SpO2: 100%   Weight: 68.8 kg (151 lb 9.6 oz)   Height: 1.727 m (5' 8\")      Depression: Not at risk (3/12/2025)    PHQ-2     PHQ-2 Score: 0        Click Here for Release of Records Request    Chart reviewed: immunizations are documented.   Immunization History   Administered Date(s) Administered    COVID-19, PFIZER PURPLE top, DILUTE for use, (age 12 y+), 30mcg/0.3mL 09/06/2021, 09/28/2021    TDaP, ADACEL (age 10y-64y), BOOSTRIX (age 10y+), IM, 0.5mL 07/23/2015      Patient provided with most updated VIS prior to administration. Opportunity given for questions and concerns provided. No concerns at this time    Immunizations administered to patient 6/30/2025 by Rachele Johnson LPN with consent.Patient tolerated procedure well. No reactions noted.

## 2025-07-01 DIAGNOSIS — E78.2 COMBINED HYPERLIPIDEMIA: ICD-10-CM

## 2025-07-01 DIAGNOSIS — E55.9 VITAMIN D DEFICIENCY: ICD-10-CM

## 2025-07-01 DIAGNOSIS — Z00.00 PHYSICAL EXAM: ICD-10-CM

## 2025-07-01 DIAGNOSIS — N40.0 BPH WITH ELEVATED PSA: ICD-10-CM

## 2025-07-01 DIAGNOSIS — R97.20 BPH WITH ELEVATED PSA: ICD-10-CM

## 2025-07-02 ENCOUNTER — RESULTS FOLLOW-UP (OUTPATIENT)
Dept: PRIMARY CARE CLINIC | Facility: CLINIC | Age: 55
End: 2025-07-02

## 2025-07-02 LAB
25(OH)D3 SERPL-MCNC: 32 NG/ML (ref 30–100)
ALBUMIN SERPL-MCNC: 4.2 G/DL (ref 3.5–5)
ALBUMIN/GLOB SERPL: 1.6 (ref 1.1–2.2)
ALP SERPL-CCNC: 102 U/L (ref 45–117)
ALT SERPL-CCNC: 29 U/L (ref 12–78)
ANION GAP SERPL CALC-SCNC: 3 MMOL/L (ref 2–12)
AST SERPL-CCNC: 14 U/L (ref 15–37)
BILIRUB SERPL-MCNC: 0.7 MG/DL (ref 0.2–1)
BUN SERPL-MCNC: 15 MG/DL (ref 6–20)
BUN/CREAT SERPL: 16 (ref 12–20)
CALCIUM SERPL-MCNC: 9.2 MG/DL (ref 8.5–10.1)
CHLORIDE SERPL-SCNC: 107 MMOL/L (ref 97–108)
CHOLEST SERPL-MCNC: 186 MG/DL
CO2 SERPL-SCNC: 30 MMOL/L (ref 21–32)
CREAT SERPL-MCNC: 0.94 MG/DL (ref 0.7–1.3)
ERYTHROCYTE [DISTWIDTH] IN BLOOD BY AUTOMATED COUNT: 11.9 % (ref 11.5–14.5)
GLOBULIN SER CALC-MCNC: 2.7 G/DL (ref 2–4)
GLUCOSE SERPL-MCNC: 101 MG/DL (ref 65–100)
HCT VFR BLD AUTO: 46 % (ref 36.6–50.3)
HDLC SERPL-MCNC: 62 MG/DL
HDLC SERPL: 3 (ref 0–5)
HGB BLD-MCNC: 14.4 G/DL (ref 12.1–17)
LDLC SERPL CALC-MCNC: 105 MG/DL (ref 0–100)
MCH RBC QN AUTO: 28.7 PG (ref 26–34)
MCHC RBC AUTO-ENTMCNC: 31.3 G/DL (ref 30–36.5)
MCV RBC AUTO: 91.8 FL (ref 80–99)
NRBC # BLD: 0 K/UL (ref 0–0.01)
NRBC BLD-RTO: 0 PER 100 WBC
PLATELET # BLD AUTO: 272 K/UL (ref 150–400)
PMV BLD AUTO: 10 FL (ref 8.9–12.9)
POTASSIUM SERPL-SCNC: 4.1 MMOL/L (ref 3.5–5.1)
PROT SERPL-MCNC: 6.9 G/DL (ref 6.4–8.2)
PSA SERPL-MCNC: 3.8 NG/ML (ref 0.01–4)
RBC # BLD AUTO: 5.01 M/UL (ref 4.1–5.7)
SODIUM SERPL-SCNC: 140 MMOL/L (ref 136–145)
TRIGL SERPL-MCNC: 95 MG/DL
VLDLC SERPL CALC-MCNC: 19 MG/DL
WBC # BLD AUTO: 8.3 K/UL (ref 4.1–11.1)

## 2025-07-04 DIAGNOSIS — Z12.83 SKIN CANCER SCREENING: Primary | ICD-10-CM

## 2025-07-14 ENCOUNTER — TELEPHONE (OUTPATIENT)
Dept: PRIMARY CARE CLINIC | Facility: CLINIC | Age: 55
End: 2025-07-14

## 2025-08-06 ENCOUNTER — OFFICE VISIT (OUTPATIENT)
Dept: PRIMARY CARE CLINIC | Facility: CLINIC | Age: 55
End: 2025-08-06
Payer: COMMERCIAL

## 2025-08-06 VITALS
WEIGHT: 152 LBS | HEART RATE: 91 BPM | DIASTOLIC BLOOD PRESSURE: 88 MMHG | BODY MASS INDEX: 23.04 KG/M2 | RESPIRATION RATE: 18 BRPM | SYSTOLIC BLOOD PRESSURE: 132 MMHG | HEIGHT: 68 IN | TEMPERATURE: 97 F | OXYGEN SATURATION: 99 %

## 2025-08-06 DIAGNOSIS — A04.8 H. PYLORI INFECTION: ICD-10-CM

## 2025-08-06 DIAGNOSIS — M47.816 FACET ARTHRITIS, DEGENERATIVE, LUMBAR SPINE: ICD-10-CM

## 2025-08-06 DIAGNOSIS — R11.0 NAUSEA: ICD-10-CM

## 2025-08-06 DIAGNOSIS — R10.31 RIGHT LOWER QUADRANT ABDOMINAL PAIN: Primary | ICD-10-CM

## 2025-08-06 DIAGNOSIS — R03.0 ELEVATED BLOOD PRESSURE READING WITHOUT DIAGNOSIS OF HYPERTENSION: ICD-10-CM

## 2025-08-06 PROCEDURE — 99214 OFFICE O/P EST MOD 30 MIN: CPT | Performed by: INTERNAL MEDICINE

## 2025-08-06 RX ORDER — AMOXICILLIN 500 MG/1
500 CAPSULE ORAL 2 TIMES DAILY
COMMUNITY
Start: 2025-07-17

## 2025-08-06 RX ORDER — RIFABUTIN 150 MG/1
150 CAPSULE ORAL DAILY
COMMUNITY

## 2025-08-06 RX ORDER — NAPROXEN 500 MG/1
500 TABLET ORAL 2 TIMES DAILY WITH MEALS
Qty: 20 TABLET | Refills: 0 | Status: SHIPPED | OUTPATIENT
Start: 2025-08-06 | End: 2025-08-16

## 2025-08-06 ASSESSMENT — PATIENT HEALTH QUESTIONNAIRE - PHQ9
SUM OF ALL RESPONSES TO PHQ QUESTIONS 1-9: 0
2. FEELING DOWN, DEPRESSED OR HOPELESS: NOT AT ALL
SUM OF ALL RESPONSES TO PHQ QUESTIONS 1-9: 0
SUM OF ALL RESPONSES TO PHQ QUESTIONS 1-9: 0
1. LITTLE INTEREST OR PLEASURE IN DOING THINGS: NOT AT ALL
SUM OF ALL RESPONSES TO PHQ QUESTIONS 1-9: 0

## 2025-08-20 ENCOUNTER — HOSPITAL ENCOUNTER (OUTPATIENT)
Facility: HOSPITAL | Age: 55
Discharge: HOME | End: 2025-08-23
Attending: INTERNAL MEDICINE
Payer: COMMERCIAL

## 2025-08-20 DIAGNOSIS — R10.31 RIGHT LOWER QUADRANT ABDOMINAL PAIN: ICD-10-CM

## 2025-08-20 DIAGNOSIS — R11.0 NAUSEA: ICD-10-CM

## 2025-08-20 PROCEDURE — 6360000004 HC RX CONTRAST MEDICATION: Performed by: INTERNAL MEDICINE

## 2025-08-20 PROCEDURE — 2500000003 HC RX 250 WO HCPCS: Performed by: INTERNAL MEDICINE

## 2025-08-20 PROCEDURE — 74177 CT ABD & PELVIS W/CONTRAST: CPT

## 2025-08-20 RX ORDER — IOPAMIDOL 755 MG/ML
100 INJECTION, SOLUTION INTRAVASCULAR
Status: COMPLETED | OUTPATIENT
Start: 2025-08-20 | End: 2025-08-20

## 2025-08-20 RX ADMIN — BARIUM SULFATE 900 ML: 21 SUSPENSION ORAL at 09:29

## 2025-08-20 RX ADMIN — IOPAMIDOL 100 ML: 755 INJECTION, SOLUTION INTRAVENOUS at 09:28

## 2025-08-27 DIAGNOSIS — K76.0 HEPATIC STEATOSIS: Primary | ICD-10-CM
